# Patient Record
Sex: FEMALE | Race: WHITE | NOT HISPANIC OR LATINO | Employment: OTHER | URBAN - METROPOLITAN AREA
[De-identification: names, ages, dates, MRNs, and addresses within clinical notes are randomized per-mention and may not be internally consistent; named-entity substitution may affect disease eponyms.]

---

## 2017-01-13 ENCOUNTER — APPOINTMENT (OUTPATIENT)
Dept: RADIOLOGY | Facility: IMAGING CENTER | Age: 67
End: 2017-01-13
Attending: FAMILY MEDICINE
Payer: COMMERCIAL

## 2017-01-13 ENCOUNTER — OFFICE VISIT (OUTPATIENT)
Dept: MEDICAL GROUP | Facility: PHYSICIAN GROUP | Age: 67
End: 2017-01-13
Payer: COMMERCIAL

## 2017-01-13 VITALS
TEMPERATURE: 97.9 F | HEART RATE: 88 BPM | RESPIRATION RATE: 16 BRPM | HEIGHT: 65 IN | DIASTOLIC BLOOD PRESSURE: 68 MMHG | BODY MASS INDEX: 31.99 KG/M2 | SYSTOLIC BLOOD PRESSURE: 130 MMHG | OXYGEN SATURATION: 96 % | WEIGHT: 192 LBS

## 2017-01-13 DIAGNOSIS — Z13.6 SCREENING FOR CARDIOVASCULAR CONDITION: ICD-10-CM

## 2017-01-13 DIAGNOSIS — M19.049 ARTHRITIS OF HAND: ICD-10-CM

## 2017-01-13 DIAGNOSIS — Z00.00 ROUTINE GENERAL MEDICAL EXAMINATION AT A HEALTH CARE FACILITY: ICD-10-CM

## 2017-01-13 PROCEDURE — 73120 X-RAY EXAM OF HAND: CPT | Mod: TC,LT | Performed by: FAMILY MEDICINE

## 2017-01-13 PROCEDURE — 99214 OFFICE O/P EST MOD 30 MIN: CPT | Performed by: FAMILY MEDICINE

## 2017-01-13 PROCEDURE — 73120 X-RAY EXAM OF HAND: CPT | Mod: TC,RT | Performed by: FAMILY MEDICINE

## 2017-01-13 RX ORDER — TRAMADOL HYDROCHLORIDE 50 MG/1
50 TABLET ORAL 2 TIMES DAILY
Qty: 60 TAB | Refills: 0 | Status: ON HOLD | OUTPATIENT
Start: 2017-01-13 | End: 2017-01-21

## 2017-01-13 ASSESSMENT — PATIENT HEALTH QUESTIONNAIRE - PHQ9: CLINICAL INTERPRETATION OF PHQ2 SCORE: 0

## 2017-01-13 NOTE — PROGRESS NOTES
Subjective:     Chief Complaint   Patient presents with   • Hand Pain       Derek Guzman is a 66 y.o. female here today for worseing bilat hand pain several weeks.  Patient reports tenderness and pain that is worse with cold weather and use. She reports pain is particularly severe when she is typing at work. Pain is improved with biofreeze. Patient has tried alleve 2 AM and 2 PM minimal relief.    Pt is seen by [facility] for R shoulder pain due to arthritis.     Patient has not had general well exam in several years and would like to address general healthcare this time.    Allergies   Allergen Reactions   • Tape Rash     blisters     Current medicines (including changes today)  Current Outpatient Prescriptions   Medication Sig Dispense Refill   • tramadol (ULTRAM) 50 MG Tab Take 1 Tab by mouth 2 Times a Day. 60 Tab 0   • Calcium Carbonate-Vitamin D (CALCIUM + D PO) Take  by mouth.     • Cholecalciferol (VITAMIN D-3 PO) Take 400 Units by mouth.     • Naproxen Sodium (ALEVE PO) Take  by mouth.     • Multiple Vitamin (MULTI-VITAMIN PO) Take  by mouth.     • FIBER PO Take  by mouth.     • cetirizine (ZYRTEC) 10 MG TABS Take  by mouth.       No current facility-administered medications for this visit.     Social History   Substance Use Topics   • Smoking status: Former Smoker -- 0.50 packs/day for 7 years     Quit date: 1978   • Smokeless tobacco: Never Used      Comment: avoid all tobacco products   • Alcohol Use: No     Family Status   Relation Status Death Age   • Mother     • Father       Family History   Problem Relation Age of Onset   • Diabetes     • Heart Disease     • Cancer     • Cancer Mother      colon cancer twice, lung cancer   • Diabetes Mother    • Heart Disease Father      mi   • Diabetes Father      She    has a past medical history of Infectious disease; Allergic rhinitis (10/1/2009); Pain; and Sigmoid diverticulosis (2016).        ROS   GEN: no weight loss, fevers, or  "chills  HEENT: no blurry vision or change in vision, no ear pain, no difficulty swallowing, no throat pain, no runny nose, no nasal congestion  Resp: no shortness of breath, no cough  CV: no racing heart, no irregular beats, no chest pain  Abd: no nausea, no vomiting, no diarrhea, no constipation, no blood in stool, no dark stools, no incontinence  : no dysuria, no hematuria, no urinary incontinence, no increased frequency  MSK: no muscle aches, no joint pain, no limited motion  Neuro: no headaches, no dizziness, no LOC, no weakness, no numbness/tingling       Objective:     Blood pressure 130/68, pulse 88, temperature 36.6 °C (97.9 °F), resp. rate 16, height 1.651 m (5' 5\"), weight 87.091 kg (192 lb), SpO2 96 %. Body mass index is 31.95 kg/(m^2).   Physical Exam:  Constitutional: Alert, no distress.  Skin: Warm, dry, good turgor, no rashes in visible areas.  Eye: Equal, round and reactive, conjunctiva clear, lids normal.  ENMT: Lips without lesions, good dentition, oropharynx non-erythematous, no exudate, moist oral mucosa, bilateral tympanic membranes: No bulging, no retraction, no fluid, nonerythematous, positive light reflex, external auditory canals: Clear, scant cerumen, nonerythematous  Neck: Trachea midline, no masses, no thyromegaly. No cervical or supraclavicular lymphadenopathy. Full ROM  Respiratory: Unlabored respiratory effort, good air movement, lungs clear to auscultation, no wheezes, no ronchi.  Cardiovascular:RRR, +S1, S2, no murmur, no peripheral edema, pedal and radial pulses equal and intact bilat  Abdomen: Soft, non-tender, no masses, no hepatosplenomegaly.  MSK:5/5 muscle strength in upper extremities as well as lower extremity bilaterally, bilateral tarsophalangeal enlargement with erythema, no warmth  Psych: Alert and oriented x3, appropriate affect and mood.  Neuro: CN2-12 intact, no gross motor or sensory deficits      Assessment and Plan:   The following treatment plan was " discussed    1. Routine general medical examination at a health care facility  General exam as above  - COMP METABOLIC PANEL; Future  - CBC WITH DIFFERENTIAL; Future    2. Arthritis of hand  New Problem: Further workup indicated Worsening problems reported today. Unrelieved with naproxen. Recommend tramadol when necessary. We'll obtain x-rays to rule out other causes such as rheumatoid arthritis.   - DX-HAND 2- RIGHT; Future  - DX-HAND 2- LEFT; Future  - tramadol (ULTRAM) 50 MG Tab; Take 1 Tab by mouth 2 Times a Day.  Dispense: 60 Tab; Refill: 0  - [facility] PAIN MANAGEMENT SCREEN; Future  - CONSENT RISKS OF CONTROL SUBST  - COMP METABOLIC PANEL; Future  - CBC WITH DIFFERENTIAL; Future    3. Screening for cardiovascular condition  - LIPID PROFILE; Future    Patient states PNA and flu vaccine were given at pharmacy       Followup: Return in about 4 weeks (around 2/10/2017) for PAP.    Please note that this dictation was created using voice recognition software. I have made every reasonable attempt to correct obvious errors, but I expect that there are errors of grammar and possibly content that I did not discover before finalizing the note.

## 2017-01-13 NOTE — MR AVS SNAPSHOT
"Derek Guzman   2017 3:20 PM   Office Visit   MRN: [id number]    Department:  [facility]   Dept Phone:  [phone]    Description:  Female : 1950   Provider:  Lise Thrasher D.O.           Reason for Visit     Hand Pain           Allergies as of 2017     Allergen Noted Reactions    Tape 06/10/2011   Rash    blisters      You were diagnosed with     Routine general medical examination at a health care facility   [V70.0.ICD-9-CM]       Arthritis of hand   [206080]       Screening for cardiovascular condition   [402326]         Vital Signs     Blood Pressure Pulse Temperature Respirations Height Weight    130/68 mmHg 88 36.6 °C (97.9 °F) 16 1.651 m (5' 5\") 87.091 kg (192 lb)    Body Mass Index Oxygen Saturation Smoking Status             31.95 kg/m2 96% Former Smoker         Basic Information     Date Of Birth Sex Race Ethnicity Preferred Language    1950 Female White Non- English      Problem List              ICD-10-CM Priority Class Noted - Resolved    Allergic rhinitis    10/1/2009 - Present    History of colonoscopy with polypectomy Z98.890, Z86.010   2010 - Present    History of melanoma Z85.820   10/10/2013 - Present    Degenerative disc disease, lumbar M51.36   2014 - Present    Sigmoid diverticulosis K57.30   2016 - Present      Health Maintenance        Date Due Completion Dates    IMM INFLUENZA (1) 2016 ---    PAP SMEAR 10/3/2016 10/3/2013    IMM PNEUMOCOCCAL 65+ (ADULT) LOW/MEDIUM RISK SERIES (1 of 2 - PCV13) 2016 ---    MAMMOGRAM 2017, 4/3/2015, 2013, 2012, 2011, 2009, 2009, 2008, 2008, 2008, 2007, 2007, 2006, 2005, 10/13/2005    BONE DENSITY 2021, 2009    COLONOSCOPY 2021    IMM DTaP/Tdap/Td Vaccine (2 - Td) 2023            Current Immunizations     SHINGLES VACCINE 2016 10:20 AM    Tdap Vaccine 2013      "   Below and/or attached are the medications your provider expects you to take. Review all of your home medications and newly ordered medications with your provider and/or pharmacist. Follow medication instructions as directed by your provider and/or pharmacist. Please keep your medication list with you and share with your provider. Update the information when medications are discontinued, doses are changed, or new medications (including over-the-counter products) are added; and carry medication information at all times in the event of emergency situations     Allergies:  TAPE - Rash               Medications  Valid as of: January 13, 2017 -  6:24 PM    Generic Name Brand Name Tablet Size Instructions for use    Calcium Citrate-Vitamin D   Take  by mouth.        Cetirizine HCl (Tab) ZYRTEC 10 MG Take  by mouth.        Cholecalciferol   Take 400 Units by mouth.        Fiber   Take  by mouth.        Multiple Vitamin   Take  by mouth.        Naproxen Sodium   Take  by mouth.        TraMADol HCl (Tab) ULTRAM 50 MG Take 1 Tab by mouth 2 Times a Day.        .                 Medicines prescribed today were sent to:     [facility] [address] [place], [place] [address] [address] [address] AT [facility] [address] [place] [address] [place] [address]    Phone: [phone] Fax: [phone]    Open 24 Hours?: No      Medication refill instructions:       If your prescription bottle indicates you have medication refills left, it is not necessary to call your provider’s office. Please contact your pharmacy and they will refill your medication.    If your prescription bottle indicates you do not have any refills left, you may request refills at any time through one of the following ways: The online Inofile system (except Urgent Care), by calling your provider’s office, or by asking your pharmacy to contact your provider’s office with a refill request. Medication refills are processed only during regular business hours and may not be  available until the next business day. Your provider may request additional information or to have a follow-up visit with you prior to refilling your medication.   *Please Note: Medication refills are assigned a new Rx number when refilled electronically. Your pharmacy may indicate that no refills were authorized even though a new prescription for the same medication is available at the pharmacy. Please request the medicine by name with the pharmacy before contacting your provider for a refill.        Your To Do List     Future Labs/Procedures Complete By Expires    CBC WITH DIFFERENTIAL  As directed 1/13/2018    COMP METABOLIC PANEL  As directed 1/13/2018    DX-HAND 2- LEFT  As directed 1/13/2018    DX-HAND 2- RIGHT  As directed 1/13/2018    LIPID PROFILE  As directed 1/13/2018    [facility] PAIN MANAGEMENT SCREEN  As directed 1/13/2018    Comments:    Current Meds (name, sig, last dose):   Current outpatient prescriptions:   •  Calcium Carbonate-Vitamin D (CALCIUM + D PO), Take  by mouth.  •  Cholecalciferol (VITAMIN D-3 PO), Take 400 Units by mouth.  •  Naproxen Sodium (ALEVE PO), Take  by mouth.  •  Multiple Vitamin (MULTI-VITAMIN PO), Take  by mouth.  •  FIBER PO, Take  by mouth.  •  cetirizine, Take  by mouth.         [email or web address] Access Code: Activation code not generated  Current [email or web address] Status: Active

## 2017-01-16 ENCOUNTER — HOSPITAL ENCOUNTER (OUTPATIENT)
Dept: LAB | Facility: MEDICAL CENTER | Age: 67
End: 2017-01-16
Attending: FAMILY MEDICINE
Payer: COMMERCIAL

## 2017-01-16 DIAGNOSIS — Z13.6 SCREENING FOR CARDIOVASCULAR CONDITION: ICD-10-CM

## 2017-01-16 DIAGNOSIS — M19.049 ARTHRITIS OF HAND: ICD-10-CM

## 2017-01-16 DIAGNOSIS — Z00.00 ROUTINE GENERAL MEDICAL EXAMINATION AT A HEALTH CARE FACILITY: ICD-10-CM

## 2017-01-16 LAB
ALBUMIN SERPL BCP-MCNC: 3.8 G/DL (ref 3.2–4.9)
ALBUMIN/GLOB SERPL: 1.4 G/DL
ALP SERPL-CCNC: 68 U/L (ref 30–99)
ALT SERPL-CCNC: 15 U/L (ref 2–50)
ANION GAP SERPL CALC-SCNC: 4 MMOL/L (ref 0–11.9)
AST SERPL-CCNC: 18 U/L (ref 12–45)
BASOPHILS # BLD AUTO: 0.05 K/UL (ref 0–0.12)
BASOPHILS NFR BLD AUTO: 1 % (ref 0–1.8)
BILIRUB SERPL-MCNC: 0.4 MG/DL (ref 0.1–1.5)
BUN SERPL-MCNC: 23 MG/DL (ref 8–22)
CALCIUM SERPL-MCNC: 9.8 MG/DL (ref 8.5–10.5)
CHLORIDE SERPL-SCNC: 105 MMOL/L (ref 96–112)
CHOLEST SERPL-MCNC: 192 MG/DL (ref 100–199)
CO2 SERPL-SCNC: 28 MMOL/L (ref 20–33)
CREAT SERPL-MCNC: 0.74 MG/DL (ref 0.5–1.4)
EOSINOPHIL # BLD: 0.25 K/UL (ref 0–0.51)
EOSINOPHIL NFR BLD AUTO: 4.9 % (ref 0–6.9)
ERYTHROCYTE [DISTWIDTH] IN BLOOD BY AUTOMATED COUNT: 44.1 FL (ref 35.9–50)
GLOBULIN SER CALC-MCNC: 2.7 G/DL (ref 1.9–3.5)
GLUCOSE SERPL-MCNC: 94 MG/DL (ref 65–99)
HCT VFR BLD AUTO: 45.4 % (ref 37–47)
HDLC SERPL-MCNC: 56 MG/DL
HGB BLD-MCNC: 14.6 G/DL (ref 12–16)
IMM GRANULOCYTES # BLD AUTO: 0.01 K/UL (ref 0–0.11)
IMM GRANULOCYTES NFR BLD AUTO: 0.2 % (ref 0–0.9)
LDLC SERPL CALC-MCNC: 120 MG/DL
LYMPHOCYTES # BLD: 1.6 K/UL (ref 1–4.8)
LYMPHOCYTES NFR BLD AUTO: 31.4 % (ref 22–41)
MCH RBC QN AUTO: 30 PG (ref 27–33)
MCHC RBC AUTO-ENTMCNC: 32.2 G/DL (ref 33.6–35)
MCV RBC AUTO: 93.2 FL (ref 81.4–97.8)
MONOCYTES # BLD: 0.38 K/UL (ref 0–0.85)
MONOCYTES NFR BLD AUTO: 7.5 % (ref 0–13.4)
NEUTROPHILS # BLD: 2.8 K/UL (ref 2–7.15)
NEUTROPHILS NFR BLD AUTO: 55 % (ref 44–72)
NRBC # BLD AUTO: 0 K/UL
NRBC BLD-RTO: 0 /100 WBC
PLATELET # BLD AUTO: 237 K/UL (ref 164–446)
PMV BLD AUTO: 11.4 FL (ref 9–12.9)
POTASSIUM SERPL-SCNC: 4.6 MMOL/L (ref 3.6–5.5)
PROT SERPL-MCNC: 6.5 G/DL (ref 6–8.2)
RBC # BLD AUTO: 4.87 M/UL (ref 4.2–5.4)
SODIUM SERPL-SCNC: 137 MMOL/L (ref 135–145)
TRIGL SERPL-MCNC: 80 MG/DL (ref 0–149)
WBC # BLD AUTO: 5.1 K/UL (ref 4.8–10.8)

## 2017-01-16 PROCEDURE — 36415 COLL VENOUS BLD VENIPUNCTURE: CPT

## 2017-01-16 PROCEDURE — 85025 COMPLETE CBC W/AUTO DIFF WBC: CPT

## 2017-01-16 PROCEDURE — 80061 LIPID PANEL: CPT

## 2017-01-16 PROCEDURE — 80053 COMPREHEN METABOLIC PANEL: CPT

## 2017-01-20 ENCOUNTER — APPOINTMENT (OUTPATIENT)
Dept: RADIOLOGY | Facility: MEDICAL CENTER | Age: 67
End: 2017-01-20
Attending: EMERGENCY MEDICINE
Payer: COMMERCIAL

## 2017-01-20 ENCOUNTER — APPOINTMENT (OUTPATIENT)
Dept: RADIOLOGY | Facility: MEDICAL CENTER | Age: 67
End: 2017-01-20
Attending: ORTHOPAEDIC SURGERY
Payer: COMMERCIAL

## 2017-01-20 ENCOUNTER — HOSPITAL ENCOUNTER (OUTPATIENT)
Facility: MEDICAL CENTER | Age: 67
End: 2017-01-21
Attending: EMERGENCY MEDICINE | Admitting: ORTHOPAEDIC SURGERY
Payer: COMMERCIAL

## 2017-01-20 DIAGNOSIS — S82.872A CLOSED DISPLACED PILON FRACTURE OF LEFT TIBIA, INITIAL ENCOUNTER: ICD-10-CM

## 2017-01-20 DIAGNOSIS — S82.402A CLOSED FRACTURE OF SHAFT OF LEFT TIBIA AND FIBULA, INITIAL ENCOUNTER: ICD-10-CM

## 2017-01-20 DIAGNOSIS — S82.202A CLOSED FRACTURE OF SHAFT OF LEFT TIBIA AND FIBULA, INITIAL ENCOUNTER: ICD-10-CM

## 2017-01-20 PROBLEM — S82.209A TIBIA FRACTURE: Status: ACTIVE | Noted: 2017-01-20

## 2017-01-20 PROBLEM — S82.873A: Status: ACTIVE | Noted: 2017-01-20

## 2017-01-20 PROCEDURE — G0378 HOSPITAL OBSERVATION PER HR: HCPCS

## 2017-01-20 PROCEDURE — 700111 HCHG RX REV CODE 636 W/ 250 OVERRIDE (IP)

## 2017-01-20 PROCEDURE — 160039 HCHG SURGERY MINUTES - EA ADDL 1 MIN LEVEL 3: Performed by: ORTHOPAEDIC SURGERY

## 2017-01-20 PROCEDURE — 73590 X-RAY EXAM OF LOWER LEG: CPT | Mod: LT

## 2017-01-20 PROCEDURE — A4606 OXYGEN PROBE USED W OXIMETER: HCPCS | Performed by: ORTHOPAEDIC SURGERY

## 2017-01-20 PROCEDURE — 501735 HCHG WIRE, K-150MM: Performed by: ORTHOPAEDIC SURGERY

## 2017-01-20 PROCEDURE — A9270 NON-COVERED ITEM OR SERVICE: HCPCS

## 2017-01-20 PROCEDURE — 160002 HCHG RECOVERY MINUTES (STAT): Performed by: ORTHOPAEDIC SURGERY

## 2017-01-20 PROCEDURE — 700102 HCHG RX REV CODE 250 W/ 637 OVERRIDE(OP)

## 2017-01-20 PROCEDURE — 160028 HCHG SURGERY MINUTES - 1ST 30 MINS LEVEL 3: Performed by: ORTHOPAEDIC SURGERY

## 2017-01-20 PROCEDURE — 500881 HCHG PACK, EXTREMITY: Performed by: ORTHOPAEDIC SURGERY

## 2017-01-20 PROCEDURE — 502000 HCHG MISC OR IMPLANTS RC 0278: Performed by: ORTHOPAEDIC SURGERY

## 2017-01-20 PROCEDURE — 700101 HCHG RX REV CODE 250

## 2017-01-20 PROCEDURE — 73610 X-RAY EXAM OF ANKLE: CPT | Mod: LT

## 2017-01-20 PROCEDURE — 73700 CT LOWER EXTREMITY W/O DYE: CPT | Mod: LT

## 2017-01-20 PROCEDURE — 500054 HCHG BANDAGE, ELASTIC 6: Performed by: ORTHOPAEDIC SURGERY

## 2017-01-20 PROCEDURE — 110371 HCHG SHELL REV 272: Performed by: ORTHOPAEDIC SURGERY

## 2017-01-20 PROCEDURE — 501838 HCHG SUTURE GENERAL: Performed by: ORTHOPAEDIC SURGERY

## 2017-01-20 PROCEDURE — 99291 CRITICAL CARE FIRST HOUR: CPT

## 2017-01-20 PROCEDURE — 160035 HCHG PACU - 1ST 60 MINS PHASE I: Performed by: ORTHOPAEDIC SURGERY

## 2017-01-20 PROCEDURE — 96374 THER/PROPH/DIAG INJ IV PUSH: CPT

## 2017-01-20 PROCEDURE — C1713 ANCHOR/SCREW BN/BN,TIS/BN: HCPCS | Performed by: ORTHOPAEDIC SURGERY

## 2017-01-20 PROCEDURE — 160036 HCHG PACU - EA ADDL 30 MINS PHASE I: Performed by: ORTHOPAEDIC SURGERY

## 2017-01-20 PROCEDURE — A6454 SELF-ADHER BAND W>=3" <5"/YD: HCPCS | Performed by: ORTHOPAEDIC SURGERY

## 2017-01-20 PROCEDURE — 96375 TX/PRO/DX INJ NEW DRUG ADDON: CPT

## 2017-01-20 PROCEDURE — 501445 HCHG STAPLER, SKIN DISP: Performed by: ORTHOPAEDIC SURGERY

## 2017-01-20 PROCEDURE — 500122 HCHG BOVIE, BLADE: Performed by: ORTHOPAEDIC SURGERY

## 2017-01-20 PROCEDURE — 500465 HCHG DRILL BIT, ASIF: Performed by: ORTHOPAEDIC SURGERY

## 2017-01-20 PROCEDURE — 160009 HCHG ANES TIME/MIN: Performed by: ORTHOPAEDIC SURGERY

## 2017-01-20 PROCEDURE — 700111 HCHG RX REV CODE 636 W/ 250 OVERRIDE (IP): Performed by: EMERGENCY MEDICINE

## 2017-01-20 PROCEDURE — 502240 HCHG MISC OR SUPPLY RC 0272: Performed by: ORTHOPAEDIC SURGERY

## 2017-01-20 PROCEDURE — 110382 HCHG SHELL REV 271: Performed by: ORTHOPAEDIC SURGERY

## 2017-01-20 PROCEDURE — 160048 HCHG OR STATISTICAL LEVEL 1-5: Performed by: ORTHOPAEDIC SURGERY

## 2017-01-20 DEVICE — WIRE K 1.6 X 150 292.16 (10EA/PK) (11TX10+2TX6+1TX20=142)(CYC=30): Type: IMPLANTABLE DEVICE | Status: FUNCTIONAL

## 2017-01-20 DEVICE — IMPLANTABLE DEVICE: Type: IMPLANTABLE DEVICE | Status: FUNCTIONAL

## 2017-01-20 RX ORDER — MAGNESIUM HYDROXIDE 1200 MG/15ML
LIQUID ORAL
Status: DISCONTINUED | OUTPATIENT
Start: 2017-01-20 | End: 2017-01-20 | Stop reason: HOSPADM

## 2017-01-20 RX ORDER — ACETAMINOPHEN 500 MG
1000 TABLET ORAL EVERY 6 HOURS
Status: DISCONTINUED | OUTPATIENT
Start: 2017-01-20 | End: 2017-01-21 | Stop reason: HOSPADM

## 2017-01-20 RX ORDER — OXYCODONE HYDROCHLORIDE 5 MG/1
5 TABLET ORAL
Status: DISCONTINUED | OUTPATIENT
Start: 2017-01-20 | End: 2017-01-21 | Stop reason: HOSPADM

## 2017-01-20 RX ORDER — ONDANSETRON 2 MG/ML
4 INJECTION INTRAMUSCULAR; INTRAVENOUS EVERY 4 HOURS PRN
Status: DISCONTINUED | OUTPATIENT
Start: 2017-01-20 | End: 2017-01-21 | Stop reason: HOSPADM

## 2017-01-20 RX ORDER — OXYCODONE HYDROCHLORIDE 5 MG/1
2.5 TABLET ORAL
Status: DISCONTINUED | OUTPATIENT
Start: 2017-01-20 | End: 2017-01-21 | Stop reason: HOSPADM

## 2017-01-20 RX ORDER — BUPIVACAINE HYDROCHLORIDE AND EPINEPHRINE 5; 5 MG/ML; UG/ML
INJECTION, SOLUTION EPIDURAL; INTRACAUDAL; PERINEURAL
Status: DISCONTINUED | OUTPATIENT
Start: 2017-01-20 | End: 2017-01-20 | Stop reason: HOSPADM

## 2017-01-20 RX ORDER — MORPHINE SULFATE 4 MG/ML
2 INJECTION, SOLUTION INTRAMUSCULAR; INTRAVENOUS
Status: DISCONTINUED | OUTPATIENT
Start: 2017-01-20 | End: 2017-01-21 | Stop reason: HOSPADM

## 2017-01-20 RX ORDER — OXYCODONE HYDROCHLORIDE AND ACETAMINOPHEN 5; 325 MG/1; MG/1
TABLET ORAL
Status: COMPLETED
Start: 2017-01-20 | End: 2017-01-20

## 2017-01-20 RX ORDER — DIPHENHYDRAMINE HCL 25 MG
25 TABLET ORAL NIGHTLY PRN
COMMUNITY

## 2017-01-20 RX ORDER — CEFAZOLIN SODIUM 2 G/100ML
2 INJECTION, SOLUTION INTRAVENOUS EVERY 8 HOURS
Status: COMPLETED | OUTPATIENT
Start: 2017-01-21 | End: 2017-01-21

## 2017-01-20 RX ORDER — ONDANSETRON 2 MG/ML
4 INJECTION INTRAMUSCULAR; INTRAVENOUS ONCE
Status: COMPLETED | OUTPATIENT
Start: 2017-01-20 | End: 2017-01-20

## 2017-01-20 RX ADMIN — OXYCODONE AND ACETAMINOPHEN 2 TABLET: 5; 325 TABLET ORAL at 18:15

## 2017-01-20 RX ADMIN — ONDANSETRON 4 MG: 2 INJECTION, SOLUTION INTRAMUSCULAR; INTRAVENOUS at 10:09

## 2017-01-20 RX ADMIN — HYDROMORPHONE HYDROCHLORIDE 0.5 MG: 1 INJECTION, SOLUTION INTRAMUSCULAR; INTRAVENOUS; SUBCUTANEOUS at 10:10

## 2017-01-20 ASSESSMENT — PAIN SCALES - GENERAL
PAINLEVEL_OUTOF10: 4
PAINLEVEL_OUTOF10: 0
PAINLEVEL_OUTOF10: 4
PAINLEVEL_OUTOF10: 1
PAINLEVEL_OUTOF10: 7
PAINLEVEL_OUTOF10: 0
PAINLEVEL_OUTOF10: 2
PAINLEVEL_OUTOF10: 2
PAINLEVEL_OUTOF10: 0
PAINLEVEL_OUTOF10: 0
PAINLEVEL_OUTOF10: 2

## 2017-01-20 ASSESSMENT — LIFESTYLE VARIABLES
ALCOHOL_USE: NO
EVER_SMOKED: YES

## 2017-01-20 NOTE — IP AVS SNAPSHOT
" <p align=\"LEFT\"><IMG SRC=\"//[email or web address]\" alt=\"Image\" WIDTH=\"50%\" HEIGHT=\"200\" BORDER=\"\"></p>                   Name:Derek Guzman  Medical Record Number:[id number]  CSN: [id number]    YOB: 1950   Age: 66 y.o.  Sex: female  HT:1.651 m (5' 5\") WT: 84.369 kg (186 lb)          Admit Date: 1/20/2017     Discharge Date:   Today's Date: 1/21/2017  Attending Doctor:  Casey Jewell M.D.                  Allergies:  Tape          Follow-up Information     1. Follow up with Casey Jewell M.D..    Specialty:  Orthopaedics    Why:  Call ASAP to schedule appt for 10-14 days postop    Contact information    [address]  [address]  [facility] [address]  [phone]           Medication List      Take these Medications        Instructions    CALCIUM + D PO    Take 1 Tab by mouth every day.   Dose:  1 Tab       diphenhydrAMINE 25 MG Tabs   Commonly known as:  BENADRYL    Take 25 mg by mouth at bedtime as needed for Sleep.   Dose:  25 mg       MULTI-VITAMIN PO    Take 1 Tab by mouth every day.   Dose:  1 Tab       oxycodone-acetaminophen  MG Tabs   Commonly known as:  PERCOCET-10    Take 0.5-1 Tabs by mouth every four hours as needed for Moderate Pain.   Dose:  0.5-1 Tab       ZYRTEC 10 MG Tabs   Generic drug:  cetirizine    Take 10 mg by mouth 2 times a day.   Dose:  10 mg         "

## 2017-01-20 NOTE — H&P
CHIEF COMPLAINT:  Left ankle pain.    HISTORY OF PRESENT ILLNESS:  The patient is a 66-year-old female.  She was   walking and slipped on ice earlier today, twisting her left leg.  She had pain   and inability to bear weight.  She presented to the emergency department and   was found to have a left distal tibia fracture and a proximal fibular   fracture.  She denies other injuries.  She denies any numbness or paresthesias   in that extremity.    PAST MEDICAL HISTORY:  ALLERGIES:  No known drug allergies, but SHE IS ALLERGIC TO TAPE.    OUTPATIENT MEDICATIONS:  Include tramadol, calcium, vitamin D,   cholecalciferol, naproxen, multivitamin, fiber, and Zyrtec.    PAST MEDICAL DIAGNOSES:  Osteopenia, seasonal allergies.    PAST SURGICAL HISTORY:  Left foot surgery years ago, , shoulder   arthroscopy with rotator cuff repair.    SOCIAL HISTORY:  Patient quit smoking in .  She denies alcohol use.    Denies illicit drug use.  She lives in [place] with her .    FAMILY HISTORY:  Significant for diabetes, heart disease, colon cancer and   lung cancer in her mother.    REVIEW OF SYSTEMS:  She denies fevers, chills, nausea, vomiting, shortness of   breath, chest pain, otherwise normal per AMA criteria other than that already   stated in the HPI.    PHYSICAL EXAMINATION:  VITAL SIGNS:  Temperature is not recorded, heart rate 65, blood pressure   109/68, pulse oximetry 98% on room air.  GENERAL APPEARANCE:  Patient is alert.  She is oriented.  She is in no acute   distress.  HEAD, EYES, EARS, NOSE, AND THROAT:  Normocephalic and atraumatic.  Mucous   membranes are moist.  Nonicteric sclerae.  PULMONARY:  Symmetric, unlabored breathing.  CARDIOVASCULAR:  Extremities are well perfused.  No obvious elevated JVP is   noted.  ABDOMEN:  Thin, nondistended.  MUSCULOSKELETAL:  Left lower extremity, there is minimal swelling at the left   leg and ankle.  There is no obvious ecchymosis or no open wounds.  She has    palpable dorsalis pedis pulse.  She is able to dorsi and plantar flex her   foot, and flex and extend her toes with some mild limitation.  She has   sensation intact to light touch diffusely in the foot.  She has some   tenderness to palpation at the proximal leg laterally.  There is no obvious   knee effusion present.  The right lower extremity and bilateral upper   extremities are grossly neurovascularly intact without evidence of traumatic   deformity.    RADIOGRAPHIC DATA:  Plain x-rays of the left tibia as well as left ankle show   evidence of a minimally displaced spiral tibia shaft fracture and a mildly   displaced medial distal tibia fracture extending into the tibial plafond.    These findings are confirmed on CT scan.  She also has a minimally displaced   proximal fibular fracture.  There is a retained plate within the foot.  X-rays   of her hands are unremarkable from 01/13/2017.    ASSESSMENT:  A 66-year-old female with a left intraarticular distal tibia   fracture and proximal fibula fracture.  The articular fracture is mildly   displaced measuring approximately 2.5 mm on CT.  This is a closed injury.  She   is neurovascularly intact.  There is no concern for compartment syndrome   currently.    PLAN:  1.  I discussed findings with the patient.  We discussed both nonoperative and   operative management.  We discussed potential for developing posttraumatic   arthrosis given the intraarticular displacement at the ankle joint with   nonoperative management.  We discussed the risks of surgery, which would   consist of open treatment with internal fixation with plates and screws.  We   discussed the risks of surgery being infection, nonunion, malunion, wound   healing issues, implant prominence, possibly requiring removal in the future,   DVT and pulmonary embolism, neurovascular injury, ankle pain and stiffness,   general risk of anesthesia including heart attack, stroke, and death.  She   expressed  understanding and she prefers surgical management.  2.  Patient is currently n.p.o.  I will try to make preparations to go to the   operating room today for surgical fixation of her left intraarticular distal   tibia fracture, as she has very minimal soft tissue swelling and feels she is   appropriate for early definitive fixation.  3.  She should continue to be nonweightbearing left upper extremity and   elevate it in the meantime.       ____________________________________     MD BEBO Alan / REGGIE    DD:  01/20/2017 13:22:58  DT:  01/20/2017 13:43:20    D#:  [id number]  Job#:  [id number]

## 2017-01-20 NOTE — ED PROVIDER NOTES
ED Provider Note    CHIEF COMPLAINT  Chief Complaint   Patient presents with   • T-5000 FALL     pt reports that she had a slip and fall this morning in the driveway. landed on behind, reports left leg pain, with obvious swelling. CMS+       HPI  Derek Guzman is a 66 y.o. female who presents for evaluation of ground-level fall. The patient reports slipping on the driveway. She twisted her left distal leg and ankle. She specifically denies any preceding chest pain palpitations numbness weakness or tingling. She was unable to bear weight. Denies any injury to the upper extremities had neck chest abdomen or pelvis. She denies any pain in the left hip. She reports no numbness weakness or tingling. She arrives here by car with her .    REVIEW OF SYSTEMS  See HPI for further details. No loss of consciousness headache and injury All other systems are negative.     PAST MEDICAL HISTORY  Past Medical History   Diagnosis Date   • Infectious disease      pt works at [employer]   • Allergic rhinitis 10/1/2009   • Pain      left foot   • Sigmoid diverticulosis 2016       FAMILY HISTORY  Noncontributory    SOCIAL HISTORY  Social History     Social History   • Marital Status:      Spouse Name: N/A   • Number of Children: N/A   • Years of Education: N/A     Social History Main Topics   • Smoking status: Former Smoker -- 0.50 packs/day for 7 years     Quit date: 1978   • Smokeless tobacco: Never Used      Comment: avoid all tobacco products   • Alcohol Use: No   • Drug Use: No   • Sexual Activity: Not on file     Other Topics Concern   • Not on file     Social History Narrative     Former smoker no IV drugs  SURGICAL HISTORY  Past Surgical History   Procedure Laterality Date   • Pr  delivery only     • Shoulder arthroscopy  2009     right; Performed by RICHARD ELIZONDO at SURGERY SAME DAY [facility] ORS   • Rotator cuff repair  2009     Performed by RICHARD ELIZONDO at SURGERY SAME DAY  "[facility] ORS   • Toe fusion  6/20/2011     Performed by BARTOLOME ROBLES at SURGERY [facility] ORS   • Bone graft  6/20/2011     Performed by BARTOLOME ROBLES at [facility] ORS       CURRENT MEDICATIONS  No current facility-administered medications for this encounter.    Current outpatient prescriptions:   •  tramadol (ULTRAM) 50 MG Tab, Take 1 Tab by mouth 2 Times a Day., Disp: 60 Tab, Rfl: 0  •  Calcium Carbonate-Vitamin D (CALCIUM + D PO), Take  by mouth., Disp: , Rfl:   •  Cholecalciferol (VITAMIN D-3 PO), Take 400 Units by mouth., Disp: , Rfl:   •  Naproxen Sodium (ALEVE PO), Take  by mouth., Disp: , Rfl:   •  Multiple Vitamin (MULTI-VITAMIN PO), Take  by mouth., Disp: , Rfl:   •  FIBER PO, Take  by mouth., Disp: , Rfl:   •  cetirizine (ZYRTEC) 10 MG TABS, Take  by mouth., Disp: , Rfl:       ALLERGIES  Allergies   Allergen Reactions   • Tape Rash     blisters       PHYSICAL EXAM  VITAL SIGNS: /94 mmHg  Pulse 65  Resp 16  Ht 1.651 m (5' 5\")  Wt 84.369 kg (186 lb)  BMI 30.95 kg/m2  SpO2 98%      Constitutional: Well developed, Well nourished, No acute distress, Non-toxic appearance.   HENT: Normocephalic, Atraumatic, Bilateral external ears normal, Oropharynx moist, No oral exudates, Nose normal.   Eyes: PERRLA, EOMI, Conjunctiva normal, No discharge.   Neck: Normal range of motion, No tenderness, Supple, No stridor.   Cardiovascular: Normal heart rate, Normal rhythm, No murmurs, No rubs, No gallops.   Thorax & Lungs: Normal breath sounds, No respiratory distress, No wheezing, No chest tenderness.   Abdomen: Bowel sounds normal, Soft, No tenderness, No masses, No pulsatile masses.   Skin: Warm, Dry, No erythema, No rash.   Back: No tenderness, No CVA tenderness.   Extremities: Tenderness noted in the distal 3rd of the left tib-fib. Compartments are soft. No gross angulation or shortening. Dorsalis pedal pulses intact tenderness over the lateral medial malleolus. No pain noted in the left " knee or hip   Neurologic: Alert & oriented x 3, Normal motor function, Normal sensory function, No focal deficits noted.   Psychiatric: Affect normal, Judgment normal, Mood normal.         RADIOLOGY/PROCEDURES  CT-ANKLE W/O PLUS RECONS LEFT   Final Result      1.  Comminuted fracture of the distal LEFT tibia with involvement of the ventricular surface as described.   2.  No dislocation.      DX-TIBIA AND FIBULA LEFT   Final Result      1.  Comminuted fracture of the distal LEFT tibial diaphysis and metaphysis with involvement of the articular surface of the tibial plafond.   2.  Mildly displaced proximal LEFT fibular shaft fracture.      DX-ANKLE 3+ VIEWS LEFT   Final Result         Comminuted spiral fracture in the distal tibial diaphysis extending to the tibial plafond overlying soft tissue.            COURSE & MEDICAL DECISION MAKING  Pertinent Labs & Imaging studies reviewed. (See chart for details)  Patient was given IV pain medication. Emergent consultation with Dr. Jewell was obtained with orthopedics. She has a very complex multi bone fracture of the left lower extremity involving both the fibula and tibia. Based on its nature Dr. Jewell feels that she will require immediate operative intervention. She'll be admitted to orthopedics for ORIF    FINAL IMPRESSION  1. Multiple left lower extremity fractures including spiral tibial fracture proximal fibular fracture         Electronically signed by: Jarrod Contreras, 1/20/2017 10:37 AM

## 2017-01-20 NOTE — IP AVS SNAPSHOT
Unsocial Access Code: Activation code not generated  Current Unsocial Status: Active    [employer]  A secure, online tool to manage your health information     [employer] Unsocial® is a secure, online tool that connects you to your personalized health information from the privacy of your home -- day or night - making it very easy for you to manage your healthcare. Once the activation process is completed, you can even access your medical information using the Unsocial fernando, which is available for free in the Apple Fernando store or Google Play store.     Unsocial provides the following levels of access (as shown below):   My Chart Features   [facility] Primary Care Doctor [facility]  Specialists [facility]  Urgent  Care Non-[facility]  Primary Care  Doctor   Email your healthcare team securely and privately 24/7 X X X X   Manage appointments: schedule your next appointment; view details of past/upcoming appointments X      Request prescription refills. X      View recent personal medical records, including lab and immunizations X X X X   View health record, including health history, allergies, medications X X X X   Read reports about your outpatient visits, procedures, consult and ER notes X X X X   See your discharge summary, which is a recap of your hospital and/or ER visit that includes your diagnosis, lab results, and care plan. X X       How to register for Unsocial:  1. Go to  [email or web address].  2. Click on the Sign Up Now box, which takes you to the New Member Sign Up page. You will need to provide the following information:  a. Enter your Unsocial Access Code exactly as it appears at the top of this page. (You will not need to use this code after you’ve completed the sign-up process. If you do not sign up before the expiration date, you must request a new code.)   b. Enter your date of birth.   c. Enter your home email address.   d. Click Submit, and follow the next screen’s instructions.  3. Create a Unsocial ID. This will  be your [employer] login ID and cannot be changed, so think of one that is secure and easy to remember.  4. Create a [employer] password. You can change your password at any time.  5. Enter your Password Reset Question and Answer. This can be used at a later time if you forget your password.   6. Enter your e-mail address. This allows you to receive e-mail notifications when new information is available in [employer].  7. Click Sign Up. You can now view your health information.    For assistance activating your [employer] account, call [phone]

## 2017-01-20 NOTE — IP AVS SNAPSHOT
1/21/2017          Derek Guzman  [address] [place] [address]    Dear Derek:    [facility] wants to ensure your discharge home is safe and you or your loved ones have had all your questions answered regarding your care after you leave the hospital.    You may receive a telephone call within two days of your discharge.  This call is to make certain you understand your discharge instructions as well as ensure we provided you with the best care possible during your stay with us.     The call will only last approximately 3-5 minutes and will be done by a nurse.    Once again, we want to ensure your discharge home is safe and that you have a clear understanding of any next steps in your care.  If you have any questions or concerns, please do not hesitate to contact us, we are here for you.  Thank you for choosing [facility] for your healthcare needs.    Sincerely,    Luis Ca    [facility]

## 2017-01-20 NOTE — ED NOTES
Chief Complaint   Patient presents with   • T-5000 FALL     pt reports that she had a slip and fall this morning in the driveway. landed on behind, reports left leg pain, with obvious swelling. CMS+     Chart up for ERP.

## 2017-01-21 VITALS
SYSTOLIC BLOOD PRESSURE: 102 MMHG | TEMPERATURE: 97.2 F | WEIGHT: 186 LBS | RESPIRATION RATE: 15 BRPM | BODY MASS INDEX: 30.99 KG/M2 | HEIGHT: 65 IN | DIASTOLIC BLOOD PRESSURE: 57 MMHG | HEART RATE: 80 BPM | OXYGEN SATURATION: 92 %

## 2017-01-21 PROCEDURE — 96375 TX/PRO/DX INJ NEW DRUG ADDON: CPT

## 2017-01-21 PROCEDURE — 97161 PT EVAL LOW COMPLEX 20 MIN: CPT | Performed by: PHYSICAL THERAPIST

## 2017-01-21 PROCEDURE — G8978 MOBILITY CURRENT STATUS: HCPCS | Mod: CJ | Performed by: PHYSICAL THERAPIST

## 2017-01-21 PROCEDURE — A9270 NON-COVERED ITEM OR SERVICE: HCPCS | Performed by: ORTHOPAEDIC SURGERY

## 2017-01-21 PROCEDURE — G0378 HOSPITAL OBSERVATION PER HR: HCPCS

## 2017-01-21 PROCEDURE — 700111 HCHG RX REV CODE 636 W/ 250 OVERRIDE (IP): Performed by: ORTHOPAEDIC SURGERY

## 2017-01-21 PROCEDURE — 700102 HCHG RX REV CODE 250 W/ 637 OVERRIDE(OP): Performed by: ORTHOPAEDIC SURGERY

## 2017-01-21 PROCEDURE — 96376 TX/PRO/DX INJ SAME DRUG ADON: CPT

## 2017-01-21 PROCEDURE — G8979 MOBILITY GOAL STATUS: HCPCS | Mod: CJ | Performed by: PHYSICAL THERAPIST

## 2017-01-21 PROCEDURE — 700105 HCHG RX REV CODE 258

## 2017-01-21 PROCEDURE — G8980 MOBILITY D/C STATUS: HCPCS | Mod: CJ | Performed by: PHYSICAL THERAPIST

## 2017-01-21 RX ORDER — SODIUM CHLORIDE 9 MG/ML
INJECTION, SOLUTION INTRAVENOUS
Status: COMPLETED
Start: 2017-01-21 | End: 2017-01-21

## 2017-01-21 RX ORDER — OXYCODONE AND ACETAMINOPHEN 10; 325 MG/1; MG/1
.5-1 TABLET ORAL EVERY 4 HOURS PRN
Qty: 50 TAB | Refills: 0 | Status: SHIPPED | OUTPATIENT
Start: 2017-01-21 | End: 2017-07-31

## 2017-01-21 RX ADMIN — ACETAMINOPHEN 1000 MG: 500 TABLET, FILM COATED ORAL at 06:27

## 2017-01-21 RX ADMIN — ACETAMINOPHEN 1000 MG: 500 TABLET, FILM COATED ORAL at 00:51

## 2017-01-21 RX ADMIN — CEFAZOLIN SODIUM 2 G: 2 INJECTION, SOLUTION INTRAVENOUS at 00:52

## 2017-01-21 RX ADMIN — OXYCODONE HYDROCHLORIDE 5 MG: 5 TABLET ORAL at 06:28

## 2017-01-21 RX ADMIN — ENOXAPARIN SODIUM 40 MG: 100 INJECTION SUBCUTANEOUS at 06:30

## 2017-01-21 RX ADMIN — SODIUM CHLORIDE: 9 INJECTION, SOLUTION INTRAVENOUS at 02:45

## 2017-01-21 RX ADMIN — CEFAZOLIN SODIUM 2 G: 2 INJECTION, SOLUTION INTRAVENOUS at 08:53

## 2017-01-21 ASSESSMENT — GAIT ASSESSMENTS
DEVIATION: ANTALGIC;DECREASED BASE OF SUPPORT;DECREASED HEEL STRIKE;DECREASED TOE OFF
ASSISTIVE DEVICE: FRONT WHEEL WALKER
DISTANCE (FEET): 25
GAIT LEVEL OF ASSIST: STAND BY ASSIST

## 2017-01-21 ASSESSMENT — PAIN SCALES - GENERAL
PAINLEVEL_OUTOF10: 2

## 2017-01-21 NOTE — FACE TO FACE
Face to Face Note  -  Durable Medical Equipment    Casey Jewell M.D. - NPI: [id number]  I certify that this patient is under my care and that they had a durable medical equipment(DME)face to face encounter by myself that meets the physician DME face-to-face encounter requirements with this patient on:    Date of encounter:   Patient:                    MRN:                       YOB: 2016  Derek Guzman  [id number]  1950     The encounter with the patient was in whole, or in part, for the following medical condition, which is the primary reason for durable medical equipment:  Post-Op Surgery    I certify that, based on my findings, the following durable medical equipment is medically necessary:  Crutches.    HOME O2 Saturation Measurements:(Values must be present for Home Oxygen orders)         ,     ,         My Clinical findings support the need for the above equipment due to:  Bedbound/non-weight bearing    Supporting Symptoms: TTWB x 6-8 weeks postop ORIF left tibia fx

## 2017-01-21 NOTE — PROGRESS NOTES
66yoF with right tibia/fibula shaft fxs with extension into tibial plafond s/p ORIF 1/20.    S: comfortable, pain controlled.  Awaiting PT.  No complaints.    O:  Filed Vitals:    01/20/17 2145 01/21/17 0000 01/21/17 0400 01/21/17 0700   BP: 84/55 85/57 96/58 102/57   Pulse: 74 77 71 80   Temp: 36.4 °C (97.5 °F) 36.5 °C (97.7 °F) 36.8 °C (98.2 °F) 36.2 °C (97.2 °F)   Resp: 16 16 16 15   Height:       Weight:       SpO2: 90% 92% 93% 92%     Exam:  General-NAD, alert and following commands  RLE-splint c/d/i, flexing and extending toes actively without pain, SILT in toes with BCR    A: 66yoF with right tibia/fibula shaft fxs with extension into tibial plafond s/p ORIF 1/20.    Plan:  --PT for crutch training  --TTWB LLE x 6-8 weeks  --anticipate d/c to home today if cleared by PT  --fu 2 weeks postop

## 2017-01-21 NOTE — OP REPORT
DATE OF SERVICE:  01/20/2017    PREOPERATIVE DIAGNOSES:  Left tibia and fibular shaft fractures with extension   into the tibial pilon with displacement of the weightbearing surfaces of the   tibial plafond.    POSTOPERATIVE DIAGNOSES:  Left tibia and fibular shaft fractures with   extension into the tibial pilon with displacement of the weightbearing   surfaces of the tibial plafond.    PROCEDURE PERFORMED:  Open treatment with internal fixation of left tibial   pilon fracture and associated tibia shaft fracture with internal fixation.    SURGEON:  Casey Jewell MD    ANESTHESIOLOGIST:  Delio Harrell MD.    ANESTHESIA:  General.    ANTIBIOTICS:  Ancef.    ESTIMATED BLOOD LOSS:  Minimal.    TOURNIQUET TIME:  71 minutes at 250 mmHg to left thigh.    IMPLANTS:  Synthes 10-hole anteromedial variable angle distal tibial locking   plate with combination of locking and nonlocking screws.    INDICATION FOR PROCEDURE:  The patient is a 66-year-old female.  She slipped   on ice earlier today, twisting her left leg.  She presented to the emergency   department with pain and inability to bear weight and x-rays showed a spiral   comminuted fracture of the tibia shaft with extension into the tibial plafond   and displacement of approximately 2.5 mm at the medial weightbearing surface   of the tibial plafond as well as a nondisplaced posterior involvement.  She   has also had proximal third fibular shaft fracture.  Overall, the tibia   fracture that was reasonably well aligned and very mildly displaced; however,   we did discuss the intraarticular involvement, I felt that she could   potentially benefit from surgical reduction and fixation and we discussed   risks, benefits, and alternatives to surgical management including   nonoperative management.  She preferred surgery and signed informed consent   and wished to proceed as outlined above.    DESCRIPTION OF PROCEDURE:  The patient was met in the preoperative holding    area.  Her surgical site was signed and consent was confirmed for accuracy.    She was taken back to the operating room and general anesthesia was induced.    Ancef was administered and tourniquet was applied to left thigh.  Alcohol was   used for visually clean the limb.  The left lower extremity was then prepped   and draped in the usual sterile fashion.  A formal timeout was performed to   confirm the patient's correct name, correct surgical site, correct procedure,   and correct laterality.  The limb was then exsanguinated with an Esmarch and   the tourniquet was inflated to 250 mmHg.  A longitudinal incision was made   centered over the distal tibia medially with a knife down through skin.    Metzenbaum scissors were used to dissect and mobilize the saphenous vein and   nerve and protected it throughout the duration of the procedure.  Dissection   was carried down to periosteum, which was intact distally.  It was incised at   the area of the fracture.  There was a spike associated with a large medial   fracture segment that extended in the tibial plafond and there was an anterior   metaphyseal fragment extending to the distal third of the tibia shaft and a   longer spiral fracture extending to the mid diaphysis.  I exposed the fracture   and exposed the distal articular gapping with 2-point reduction forceps   reduced this fracture and then placed a 2.7 mm lag screw to achieve   interfragmentary compression at the articular surface.  I then percutaneously   slid a 10-hole anteromedial variable angle distal tibial locking plate from   the medial incision proximally and made a counter incision proximally and   positioned the plate sufficiently guided by AP and lateral fluoroscopic   imaging and pinned it into place.  I then used a 2-point reduction forceps to   reduce the distal portion of the fracture and stabilize the medial metaphyseal  fracture spike.   I then evaluated the reduction on AP and lateral  fluoroscopic   imaging, which confirmed overall acceptable alignment of the fracture.  I felt   the rotation was sufficient and the fracture was minimally displaced.  I therefore   placed several bicortical nonlocking and  unicortical locking screws in the distal   segment and then 3 bicortical nonlocking screws proximally through counter   incision felt that fixation was sufficient.  Final fluoroscopic imaging confirmed   overall acceptable alignment  of the fracture and acceptable position of the   implants.  The wounds were thoroughly irrigated and reapproximated subQ layers   with 0 Vicryl and skin edges and superficial subQ layers with 2-0 Vicryl and the skin   edges with staples.  Total of 20 mL of 0.5% Marcaine with epinephrine was injected into   the incision site for postop analgesia.  I then thoroughly cleansed and dried   the wound and applied a sterile compressive dressing.  She was then placed   into a short leg plaster well-padded splint.  Tourniquet was deflated after 71   minutes and she was awoken from anesthesia and transferred on the [facility] and   taken to postanesthesia care unit in stable condition.    PLAN:  1.  The patient will be admitted overnight for observation.  2.  She will receive Ancef 2 doses of postop for routine infection   prophylaxis.  3.  She will have SCDs while inpatient and a dose of Lovenox in the morning   for DVT prophylaxis.  4.  We will work with physical therapy for crutch training.  5.  Anticipated discharge to home tomorrow if she is doing well.       ____________________________________     MD BEBO Alan / REGGIE    DD:  01/20/2017 17:40:42  DT:  01/20/2017 18:08:26    D#:  [id number]  Job#:  [id number]

## 2017-01-21 NOTE — PROGRESS NOTES
Patient given discharge instructions and RX plus note for work excuse,  is here to take patient home, patient discharge in stable condition.

## 2017-01-21 NOTE — PROGRESS NOTES
Pt received from recovery via abdullahi, awake and alert, slideboard assist into bed,  Bulky splint drsg c/d/i to LLE and LLE elevated on pillows, wiggles toes, they are warm and pink, currently rates pain 1/10.  at bedside, admit profile complete, reviewed plan of care, orient to call light and to call for assist before OOB . Tolerating PO fluids without N/V, will monitor

## 2017-01-21 NOTE — PROGRESS NOTES
66yoF with right tibia/fibula shaft fxs with extension into tibial plafond s/p ORIF today.    S: comfortable in PACU,  at bedside.    O:  Filed Vitals:    01/20/17 1800 01/20/17 1815 01/20/17 1830 01/20/17 1900   BP:       Pulse: 76 73 75 76   Temp:       Resp: 14 14 14 15   Height:       Weight:       SpO2: 96% 98% 96% 96%     Exam:  General-NAD, alert and following commands  RLE-splint c/d/i, flexing and extending toes actively without pain, SILT in toes with BCR    A: 66yoF with right tibia/fibula shaft fxs with extension into tibial plafond s/p ORIF 1/20.    Plan:  --Admit overnight for obx  --ancef x 2 doses postop  --PT for crutch training  --TTWB LLE x 6-8 weeks  --anticipate d/c to home tomorrow

## 2017-01-21 NOTE — OR SURGEON
Immediate Post-Operative Note      PreOp Diagnosis: Left tibia pilon fracture    PostOp Diagnosis: same    Procedure(s) (LRB):  TIBIA ORIF (Left)    Surgeon(s):  Casey Jewell M.D.    Anesthesiologist/Type of Anesthesia:  Anesthesiologist: Delio Harrell M.D./General    Surgical Staff:  Circulator: Carmelina Nunez R.N.  Relief Scrub: Tamara Moctezuma  Scrub Person: Lyly Tarango    Specimen: none    Estimated Blood Loss: minimal    Findings: see dictation    Complications: none known    PLAN:  --Admit overnight for obx  --ancef x 2 doses postop  --PT for crutch training  --TTWB LLE x 6-8 weeks  --anticipate d/c to home tomorrow        1/20/2017 5:22 PM Casey Jewell

## 2017-01-21 NOTE — PROGRESS NOTES
Pt up to commode with 1  Person assist, voided without difficulty, BP rsystolic consistent in the high 80's to 90's, pt states this is normal for her, no complaints of being dizzy or lightheaded, will monitor

## 2017-01-21 NOTE — OR NURSING
Pt straight cathed after beginning of anesthesia administration, per pt request and Dr. Jewell agreement in pre-op. 600ml clear yellow urine on return

## 2017-01-21 NOTE — DISCHARGE SUMMARY
ADMISSION DIAGNOSES:  Left tibia and fibula shaft fracture with extension into   the weightbearing surface of the tibial plafond.    DISCHARGE DIAGNOSES: Left tibia and fibula shaft fracture with extension into   the weightbearing surface of the tibial plafond, status post open treatment   with internal fixation.    BRIEF HOSPITAL COURSE:  The patient was admitted to the emergency department   by myself on 01/20/2017, taken to the operating room for surgical fixation of   her tibia fracture.  Postoperatively, she was admitted to the orthopedic floor   where she received Ancef for postop prophylaxis.  Her vital signs have been   stable.  She has been afebrile.  Her pain is currently controlled on oral pain   medication.  She is waiting for physical therapy for gait training and after   that point will be appropriate for discharge to home.    OPERATIVE SERVICES:  On 01/20/2017 she underwent open treatment with internal   fixation of left tibia shaft fracture with extension into the weightbearing   surface of the tibial plafond by myself.    DISCHARGE INSTRUCTIONS:  1.  The patient should be toe touch weightbearing to the left lower extremity   for 6-8 weeks postop.  2.  She should keep her splint clean, dry, and intact and continue elevation   when not ambulating.  3.  She should return to the emergency department if she develops fevers,   chills, nausea, vomiting, shortness of breath, chest pain or pain uncontrolled   by oral pain medication.    DISCHARGE MEDICATIONS:  Percocet 10/325 one-half to 1 tablet q. 4 hours p.r.n.   pain.  1.  She can resume her other home medications with the exception of tramadol,   which she should not take in combination with Percocet.    FOLLOWUP:  Patient should follow up in 10-14 days for routine wound check and   staple removal and conversion to a pneumatic boot to start early ankle range   of motion.       ____________________________________     MD BEBO Alan /  REGGIE    DD:  01/21/2017 10:02:35  DT:  01/21/2017 10:15:43    D#:  [id number]  Job#:  [id number]

## 2017-01-21 NOTE — CARE PLAN
Problem: Safety  Goal: Free from accidental injury  Outcome: PROGRESSING AS EXPECTED  Call light and personal items in place, hourly rounding in practice. Pt calls for assist before OOB    Problem: Elimination  Goal: Establishment and Maintenance of regular bowel elimination  Outcome: MET Date Met:  01/21/17  Pt had BM 1/21  Goal: Regular urinary elimination  Outcome: MET Date Met:  01/21/17  Pt voiding without difficulty    Problem: Pain  Goal: Alleviation of Pain or a reduction in pain to the patient’s comfort goal  Outcome: PROGRESSING AS EXPECTED  Pt reports minimal pain. Scheduled tylenol given. Oxycodone q3h prn. Medicated x1 per MAR

## 2017-01-21 NOTE — DISCHARGE INSTRUCTIONS
Discharge Instructions    *Follow up with Dr. Jewell in 10-14 days in his office  *Weight bearing not allowed on the left leg for 6-8 weeks                 *Activity as tolerated  *Use assistive device for all activity  *Continue exercises provided by physical therapy  *Elevate leg as needed  *Ice as needed (20 minutes every 1-2 hours)  *keep dressing clean and dry until follow up  *Ok to shower with dressing covered  *No soaking of the incision; no baths, hot tubs, or swimming until cleared by doctor         *No driving until cleared by doctor  *Take medications as prescribed by doctor  *Call doctor’s office with any questions or concerns   Discharged to home by car with relative. Discharged via wheelchair, hospital escort: Yes.  Special equipment needed: Walker    Be sure to schedule a follow-up appointment with your primary care doctor or any specialists as instructed.     Discharge Plan:   Influenza Vaccine Indication: Patient Refuses    I understand that a diet low in cholesterol, fat, and sodium is recommended for good health. Unless I have been given specific instructions below for another diet, I accept this instruction as my diet prescription.     Special Instructions: Discharge instructions for the Orthopedic Patient    Follow up with Primary Care Physician within 2 weeks of discharge to home, regarding:  Review of medications and diagnostic testing.  Surveillance for medical complications.  Workup and treatment of osteoporosis, if appropriate.     -Is this a Joint Replacement patient? No    -Is this patient being discharged with medication to prevent blood clots?  No    · Is patient discharged on Warfarin / Coumadin?   No     · Is patient Post Blood Transfusion?  No    Depression / Suicide Risk    As you are discharged from this [facility] facility, it is important to learn how to keep safe from harming yourself.    Recognize the warning signs:  · Abrupt changes in personality, positive or negative-  including increase in energy   · Giving away possessions  · Change in eating patterns- significant weight changes-  positive or negative  · Change in sleeping patterns- unable to sleep or sleeping all the time   · Unwillingness or inability to communicate  · Depression  · Unusual sadness, discouragement and loneliness  · Talk of wanting to die  · Neglect of personal appearance   · Rebelliousness- reckless behavior  · Withdrawal from people/activities they love  · Confusion- inability to concentrate     If you or a loved one observes any of these behaviors or has concerns about self-harm, here's what you can do:  · Talk about it- your feelings and reasons for harming yourself  · Remove any means that you might use to hurt yourself (examples: pills, rope, extension cords, firearm)  · Get professional help from the community (Mental Health, Substance Abuse, psychological counseling)  · Do not be alone:Call your Safe Contact- someone whom you trust who will be there for you.  · Call your local CRISIS HOTLINE 891-6644 or 622-180-1369  · Call your local Children's Mobile Crisis Response Team [place] (529) 135-1017 or www.[employer]  · Call the toll free National Suicide Prevention Hotlines   · National Suicide Prevention Lifeline 072-410-HJBM (2647)  · National Hope Line Network 800-SUICIDE (496-1974)

## 2017-02-03 ENCOUNTER — TELEPHONE (OUTPATIENT)
Dept: MEDICAL GROUP | Facility: PHYSICIAN GROUP | Age: 67
End: 2017-02-03

## 2017-02-03 NOTE — TELEPHONE ENCOUNTER
Pt is requesting results from her bone density and arthritis testing. Please advise.    She also wanted me to let you know that she fractured her left left and broke the tib.    Please advise.

## 2017-02-03 NOTE — TELEPHONE ENCOUNTER
DEXA scan is a bone density test. Patient had DEXA scan in June 2016 which showed osteopenia. This means low bone density without diagnosis of osteoporosis. I recommend patient take calcium 500 mg daily and vitamin D 2000 units daily to improve bone health. I recommend to wait 1-2 years before repeating a DEXA scan.

## 2017-02-03 NOTE — TELEPHONE ENCOUNTER
Pt notified. She was currently taking 1200 of the calcium and 1000 of the d3, she will not switch the doses and lower calcium and up d3 to what is recommended.

## 2017-02-03 NOTE — ADDENDUM NOTE
Encounter addended by: Maite Qureshi R.N. on: 2/2/2017  5:28 PM<BR>     Documentation filed: Inpatient Document Flowsheet

## 2017-02-13 ENCOUNTER — OFFICE VISIT (OUTPATIENT)
Dept: MEDICAL GROUP | Facility: PHYSICIAN GROUP | Age: 67
End: 2017-02-13
Payer: COMMERCIAL

## 2017-02-13 VITALS
SYSTOLIC BLOOD PRESSURE: 130 MMHG | OXYGEN SATURATION: 94 % | HEART RATE: 66 BPM | DIASTOLIC BLOOD PRESSURE: 78 MMHG | TEMPERATURE: 97.6 F | BODY MASS INDEX: 31.99 KG/M2 | WEIGHT: 192 LBS | HEIGHT: 65 IN | RESPIRATION RATE: 16 BRPM

## 2017-02-13 DIAGNOSIS — H61.22 HEARING LOSS DUE TO CERUMEN IMPACTION, LEFT: ICD-10-CM

## 2017-02-13 PROCEDURE — 69210 REMOVE IMPACTED EAR WAX UNI: CPT | Performed by: FAMILY MEDICINE

## 2017-02-13 NOTE — MR AVS SNAPSHOT
"Derek Guzman   2017 3:20 PM   Office Visit   MRN: [id number]    Department:  [facility]   Dept Phone:  [phone]    Description:  Female : 1950   Provider:  Lise Thrasher D.O.           Reason for Visit     Other L ear plugged, diff hearing. L shoulder problem      Allergies as of 2017     Allergen Noted Reactions    Tape 06/10/2011   Rash    blisters      Vital Signs     Blood Pressure Pulse Temperature Respirations Height Weight    130/78 mmHg 66 36.4 °C (97.6 °F) 16 1.651 m (5' 5\") 87.091 kg (192 lb)    Body Mass Index Oxygen Saturation Smoking Status             31.95 kg/m2 94% Former Smoker         Basic Information     Date Of Birth Sex Race Ethnicity Preferred Language    1950 Female White Non- English      Problem List              ICD-10-CM Priority Class Noted - Resolved    Allergic rhinitis    10/1/2009 - Present    History of colonoscopy with polypectomy Z98.890, Z86.010   2010 - Present    History of melanoma Z85.820   10/10/2013 - Present    Degenerative disc disease, lumbar M51.36   2014 - Present    Sigmoid diverticulosis K57.30   2016 - Present    Displaced pilon fracture of tibia S82.873A   2017 - Present    Closed fracture of shaft of left tibia and fibula S82.202A, S82.402A   2017 - Present    Tibia fracture S82.209A   2017 - Present      Health Maintenance        Date Due Completion Dates    IMM INFLUENZA (1) 2016 ---    PAP SMEAR 10/3/2016 10/3/2013    MAMMOGRAM 2017, 4/3/2015, 2013, 2012, 2011, 2009, 2009, 2008, 2008, 2008, 2007, 2007, 2006, 2005, 10/13/2005    IMM PNEUMOCOCCAL 65+ (ADULT) LOW/MEDIUM RISK SERIES (2 of 2 - PCV13) 2017    BONE DENSITY 2021, 2009    COLONOSCOPY 2021    IMM DTaP/Tdap/Td Vaccine (2 - Td) 2023            Current Immunizations     Pneumococcal " polysaccharide vaccine (PPSV-23) 12/11/2016    SHINGLES VACCINE 5/20/2016 10:20 AM    Tdap Vaccine 4/5/2013      Below and/or attached are the medications your provider expects you to take. Review all of your home medications and newly ordered medications with your provider and/or pharmacist. Follow medication instructions as directed by your provider and/or pharmacist. Please keep your medication list with you and share with your provider. Update the information when medications are discontinued, doses are changed, or new medications (including over-the-counter products) are added; and carry medication information at all times in the event of emergency situations     Allergies:  TAPE - Rash               Medications  Valid as of: February 13, 2017 -  4:07 PM    Generic Name Brand Name Tablet Size Instructions for use    Calcium Citrate-Vitamin D   Take 1 Tab by mouth every day.        Cetirizine HCl (Tab) ZYRTEC 10 MG Take 10 mg by mouth 2 times a day.        DiphenhydrAMINE HCl (Tab) BENADRYL 25 MG Take 25 mg by mouth at bedtime as needed for Sleep.        Multiple Vitamin   Take 1 Tab by mouth every day.        Oxycodone-Acetaminophen (Tab) PERCOCET-10  MG Take 0.5-1 Tabs by mouth every four hours as needed for Moderate Pain.        .                 Medicines prescribed today were sent to:     [facility] [address] [place] [address], [place] - [facility] [place] AT [facility] [address] [place] [address] [place] [address]    Phone: [phone] Fax: [phone]    Open 24 Hours?: No      Medication refill instructions:       If your prescription bottle indicates you have medication refills left, it is not necessary to call your provider’s office. Please contact your pharmacy and they will refill your medication.    If your prescription bottle indicates you do not have any refills left, you may request refills at any time through one of the following ways: The online [employer] system (except Urgent Care), by  calling your provider’s office, or by asking your pharmacy to contact your provider’s office with a refill request. Medication refills are processed only during regular business hours and may not be available until the next business day. Your provider may request additional information or to have a follow-up visit with you prior to refilling your medication.   *Please Note: Medication refills are assigned a new Rx number when refilled electronically. Your pharmacy may indicate that no refills were authorized even though a new prescription for the same medication is available at the pharmacy. Please request the medicine by name with the pharmacy before contacting your provider for a refill.           [employer] Access Code: Activation code not generated  Current [employer] Status: Active

## 2017-02-14 NOTE — PROGRESS NOTES
Subjective:     Chief Complaint   Patient presents with   • Other     L ear plugged, diff hearing. L shoulder problem       Derek Guzman is a 66 y.o. female here today for left ear fullness and diminished hearing. Patient states that for approximately 2 weeks she has had diminished hearing in the left ear. She states that she feels that there is fullness in the ear as well. Patient denies pain, patient denies upper respiratory complaints such as runny nose, fever, chills, nasal discharge, or cough.    Allergies   Allergen Reactions   • Tape Rash     blisters     Current medicines (including changes today)  Current Outpatient Prescriptions   Medication Sig Dispense Refill   • oxycodone-acetaminophen (PERCOCET-10)  MG Tab Take 0.5-1 Tabs by mouth every four hours as needed for Moderate Pain. 50 Tab 0   • diphenhydrAMINE (BENADRYL) 25 MG Tab Take 25 mg by mouth at bedtime as needed for Sleep.     • Calcium Carbonate-Vitamin D (CALCIUM + D PO) Take 1 Tab by mouth every day.     • Multiple Vitamin (MULTI-VITAMIN PO) Take 1 Tab by mouth every day.     • cetirizine (ZYRTEC) 10 MG TABS Take 10 mg by mouth 2 times a day.       No current facility-administered medications for this visit.     Social History   Substance Use Topics   • Smoking status: Former Smoker -- 0.50 packs/day for 7 years     Quit date: 1978   • Smokeless tobacco: Never Used      Comment: avoid all tobacco products   • Alcohol Use: No     Family Status   Relation Status Death Age   • Mother     • Father       Family History   Problem Relation Age of Onset   • Diabetes     • Heart Disease     • Cancer     • Cancer Mother      colon cancer twice, lung cancer   • Diabetes Mother    • Heart Disease Father      mi   • Diabetes Father      She    has a past medical history of Infectious disease; Allergic rhinitis (10/1/2009); Pain; and Sigmoid diverticulosis (2016).        ROS   GEN: no weight loss, fevers, or chills  HEENT: no  "blurry vision or change in vision, no ear pain, no difficulty swallowing, no throat pain, no runny nose, no nasal congestion  Resp: no shortness of breath, no cough  CV: no racing heart, no irregular beats, no chest pain     Objective:     Blood pressure 130/78, pulse 66, temperature 36.4 °C (97.6 °F), resp. rate 16, height 1.651 m (5' 5\"), weight 87.091 kg (192 lb), SpO2 94 %. Body mass index is 31.95 kg/(m^2).  Physical Exam:  Constitutional: Alert, no distress.  Skin: Warm, dry, good turgor, no rashes in visible areas.  Eye: Equal, round and reactive, conjunctiva clear, lids normal.  ENMT: Lips without lesions, good dentition, oropharynx non-erythematous, no exudate, moist oral mucosa, bilateral tympanic membranes: No bulging, no retraction, no fluid, nonerythematous, positive light reflex, R external auditory canals: Clear, scant cerumen, nonerythematous, left external auditory canal: Significant cerumen impaction, flushed with several courses of warm water and curettage used to remove cerumen impaction. Patient tolerated well without any complaints.  Neck: Trachea midline, no masses, no thyromegaly. No cervical or supraclavicular lymphadenopathy. Full ROM  Respiratory: Unlabored respiratory effort, good air movement, lungs clear to auscultation, no wheezes, no ronchi.  Cardiovascular:RRR, +S1, S2, no murmur, no peripheral edema, pedal and radial pulses equal and intact bilat      Assessment and Plan:   The following treatment plan was discussed    1. Hearing loss due to cerumen impaction, left    New problem: As above patient tolerated curettage and lavage well. Impaction removed.      Followup: Return if symptoms worsen or fail to improve.    Please note that this dictation was created using voice recognition software. I have made every reasonable attempt to correct obvious errors, but I expect that there are errors of grammar and possibly content that I did not discover before finalizing the note.           "

## 2017-07-31 ENCOUNTER — OFFICE VISIT (OUTPATIENT)
Dept: MEDICAL GROUP | Facility: PHYSICIAN GROUP | Age: 67
End: 2017-07-31
Payer: COMMERCIAL

## 2017-07-31 VITALS
DIASTOLIC BLOOD PRESSURE: 72 MMHG | WEIGHT: 185 LBS | HEART RATE: 72 BPM | TEMPERATURE: 97.5 F | SYSTOLIC BLOOD PRESSURE: 124 MMHG | BODY MASS INDEX: 30.82 KG/M2 | RESPIRATION RATE: 16 BRPM | HEIGHT: 65 IN | OXYGEN SATURATION: 97 %

## 2017-07-31 DIAGNOSIS — M19.042 PRIMARY OSTEOARTHRITIS OF BOTH HANDS: ICD-10-CM

## 2017-07-31 DIAGNOSIS — B35.1 ONYCHOMYCOSIS: ICD-10-CM

## 2017-07-31 DIAGNOSIS — E66.9 OBESITY (BMI 30-39.9): ICD-10-CM

## 2017-07-31 DIAGNOSIS — M19.041 PRIMARY OSTEOARTHRITIS OF BOTH HANDS: ICD-10-CM

## 2017-07-31 PROBLEM — S82.202A CLOSED FRACTURE OF SHAFT OF LEFT TIBIA AND FIBULA: Status: RESOLVED | Noted: 2017-01-20 | Resolved: 2017-07-31

## 2017-07-31 PROBLEM — S82.402A CLOSED FRACTURE OF SHAFT OF LEFT TIBIA AND FIBULA: Status: RESOLVED | Noted: 2017-01-20 | Resolved: 2017-07-31

## 2017-07-31 PROBLEM — M85.89 OSTEOPENIA OF MULTIPLE SITES: Status: ACTIVE | Noted: 2017-07-31

## 2017-07-31 PROBLEM — S82.209A TIBIA FRACTURE: Status: RESOLVED | Noted: 2017-01-20 | Resolved: 2017-07-31

## 2017-07-31 PROBLEM — S82.873A: Status: RESOLVED | Noted: 2017-01-20 | Resolved: 2017-07-31

## 2017-07-31 PROCEDURE — 99214 OFFICE O/P EST MOD 30 MIN: CPT | Performed by: FAMILY MEDICINE

## 2017-07-31 NOTE — PROGRESS NOTES
Subjective:     Chief Complaint   Patient presents with   • Nail Problem     Left nig toe   • Arthritis       Derek Guzman is a 66 y.o. female here today for L toe nail discoloration and bilat finger joint pain.     Pt reports R great toe nail discoloration s/p boot for fracture.  Pt denies discharge or pain.     Pt types all day.  She reports bilat index finger PIP pain and swelling.     Allergies   Allergen Reactions   • Tape Rash     blisters     Current medicines (including changes today)  Current Outpatient Prescriptions   Medication Sig Dispense Refill   • Terbinafine (LAMISIL ADVANCED) 1 % Gel 1 Application by Apply externally route 2 Times a Day. 1 Tube 3   • diphenhydrAMINE (BENADRYL) 25 MG Tab Take 25 mg by mouth at bedtime as needed for Sleep.     • Calcium Carbonate-Vitamin D (CALCIUM + D PO) Take 1 Tab by mouth every day.     • cetirizine (ZYRTEC) 10 MG TABS Take 10 mg by mouth 2 times a day.       No current facility-administered medications for this visit.     Social History   Substance Use Topics   • Smoking status: Former Smoker -- 0.50 packs/day for 7 years     Quit date: 1978   • Smokeless tobacco: Never Used      Comment: avoid all tobacco products   • Alcohol Use: No     Family Status   Relation Status Death Age   • Mother     • Father       Family History   Problem Relation Age of Onset   • Diabetes     • Heart Disease     • Cancer     • Cancer Mother      colon cancer twice, lung cancer   • Diabetes Mother    • Heart Disease Father      mi   • Diabetes Father      She    has a past medical history of Infectious disease; Allergic rhinitis (10/1/2009); Pain; and Sigmoid diverticulosis (2016).        ROS   GEN: no weight loss, fevers, or chills  HEENT: no blurry vision or change in vision  Resp: no shortness of breath, no cough  CV: no racing heart, no irregular beats, no chest pain  Abd: no nausea, no vomiting, no diarrhea, no constipation, no blood in stool, no dark  "stools, no incontinence  MSK: no muscle aches, finger joint pain, no limited motion         Objective:     Blood pressure 124/72, pulse 72, temperature 36.4 °C (97.5 °F), resp. rate 16, height 1.651 m (5' 5\"), weight 83.915 kg (185 lb), SpO2 97 %. Body mass index is 30.79 kg/(m^2).   Physical Exam:  Constitutional: Alert, no distress.  Skin: Warm, dry, good turgor, L great toe nail discoloration: yellowing and thickening   Eye: Equal, round and reactive, conjunctiva clear, lids normal.  ENMT: Lips without lesions, oropharynx non-erythematous, no exudate, moist oral mucosa  Neck: Trachea midline, no masses, no thyromegaly. No cervical or supraclavicular lymphadenopathy. Full ROM  Respiratory: Unlabored respiratory effort, good air movement, lungs clear to auscultation, no wheezes, no ronchi.  Cardiovascular:RRR, +S1, S2, no murmur, no peripheral edema, pedal and radial pulses equal and intact bilat  Abdomen: Soft, non-tender, no masses, no hepatosplenomegaly.  MSK:5/5 muscle strength in upper extremities as well as lower extremity bilaterally, Bilat index finger PIP swelling without warmth or erythema  Psych: Alert and oriented, appropriate affect and mood.  Neuro: CN2-12 intact, no gross motor or sensory deficits      Assessment and Plan:   The following treatment plan was discussed  1. Primary osteoarthritis of both hands  New diagnosis: recommend heat and ice PRN with NSAIDs and Tylenol prn    2. Onychomycosis  New diagnosis: RECOMMEND topical medicaiton  - Terbinafine (LAMISIL ADVANCED) 1 % Gel; 1 Application by Apply externally route 2 Times a Day.  Dispense: 1 Tube; Refill: 3    3. Obesity (BMI 30-39.9)  Pt educated on the increase of morbidity and mortality associated with excess weight including DM, Heart Disease, HTN, stroke, and sleep apnea.  Pt advised weight loss of 5% through reduced calorie, low fat diet and 150 mins of exercise a week  - Patient identified as having weight management issue.  " Appropriate orders and counseling given.        Followup: Return in about 6 months (around 1/31/2018) for chonric pain.    Please note that this dictation was created using voice recognition software. I have made every reasonable attempt to correct obvious errors, but I expect that there are errors of grammar and possibly content that I did not discover before finalizing the note.

## 2017-07-31 NOTE — PATIENT INSTRUCTIONS
Osteoarthritis  Osteoarthritis is a disease that causes soreness and inflammation of a joint. It occurs when the cartilage at the affected joint wears down. Cartilage acts as a cushion, covering the ends of bones where they meet to form a joint. Osteoarthritis is the most common form of arthritis. It often occurs in older people. The joints affected most often by this condition include those in the:  · Ends of the fingers.  · Thumbs.  · Neck.  · Lower back.  · Knees.  · Hips.  CAUSES   Over time, the cartilage that covers the ends of bones begins to wear away. This causes bone to rub on bone, producing pain and stiffness in the affected joints.   RISK FACTORS  Certain factors can increase your chances of having osteoarthritis, including:  · Older age.  · Excessive body weight.  · Overuse of joints.  · Previous joint injury.  SIGNS AND SYMPTOMS   · Pain, swelling, and stiffness in the joint.  · Over time, the joint may lose its normal shape.  · Small deposits of bone (osteophytes) may grow on the edges of the joint.  · Bits of bone or cartilage can break off and float inside the joint space. This may cause more pain and damage.  DIAGNOSIS   Your health care provider will do a physical exam and ask about your symptoms. Various tests may be ordered, such as:  · X-rays of the affected joint.  · Blood tests to rule out other types of arthritis.  Additional tests may be used to diagnose your condition.  TREATMENT   Goals of treatment are to control pain and improve joint function. Treatment plans may include:  · A prescribed exercise program that allows for rest and joint relief.  · A weight control plan.  · Pain relief techniques, such as:  ¨ Properly applied heat and cold.  ¨ Electric pulses delivered to nerve endings under the skin (transcutaneous electrical nerve stimulation [TENS]).  ¨ Massage.  ¨ Certain nutritional supplements.  · Medicines to control pain, such as:  ¨ Acetaminophen.  ¨ Nonsteroidal  anti-inflammatory drugs (NSAIDs), such as naproxen.  ¨ Narcotic or central-acting agents, such as tramadol.  ¨ Corticosteroids. These can be given orally or as an injection.  · Surgery to reposition the bones and relieve pain (osteotomy) or to remove loose pieces of bone and cartilage. Joint replacement may be needed in advanced states of osteoarthritis.  HOME CARE INSTRUCTIONS   · Take medicines only as directed by your health care provider.  · Maintain a healthy weight. Follow your health care provider's instructions for weight control. This may include dietary instructions.  · Exercise as directed. Your health care provider can recommend specific types of exercise. These may include:  ¨ Strengthening exercises. These are done to strengthen the muscles that support joints affected by arthritis. They can be performed with weights or with exercise bands to add resistance.  ¨ Aerobic activities. These are exercises, such as brisk walking or low-impact aerobics, that get your heart pumping.  ¨ Range-of-motion activities. These keep your joints limber.  ¨ Balance and agility exercises. These help you maintain daily living skills.  · Rest your affected joints as directed by your health care provider.  · Keep all follow-up visits as directed by your health care provider.  SEEK MEDICAL CARE IF:   · Your skin turns red.  · You develop a rash in addition to your joint pain.  · You have worsening joint pain.  · You have a fever along with joint or muscle aches.  SEEK IMMEDIATE MEDICAL CARE IF:  · You have a significant loss of weight or appetite.  · You have night sweats.  FOR MORE INFORMATION   · National [place] of Arthritis and Musculoskeletal and Skin Diseases: www.niams.nih.gov  · National [place] on Aging: www.eunice.nih.gov  · American College of Rheumatology: www.rheumatology.org     This information is not intended to replace advice given to you by your health care provider. Make sure you discuss any questions you  have with your health care provider.     Document Released: 12/18/2006 Document Revised: 01/08/2016 Document Reviewed: 08/25/2014  [employer]vier Interactive Patient Education ©2016 [employer]

## 2017-07-31 NOTE — MR AVS SNAPSHOT
"Derek Guzamn   2017 8:40 AM   Office Visit   MRN: [id number]    Department:  [facility]   Dept Phone:  [phone]    Description:  Female : 1950   Provider:  Lise Thrasher D.O.           Reason for Visit     Nail Problem Left nig toe    Arthritis           Allergies as of 2017     Allergen Noted Reactions    Tape 06/10/2011   Rash    blisters      You were diagnosed with     Primary osteoarthritis of both hands   [6444242]       Onychomycosis   [[id number]]         Vital Signs     Blood Pressure Pulse Temperature Respirations Height Weight    124/72 mmHg 72 36.4 °C (97.5 °F) 16 1.651 m (5' 5\") 83.915 kg (185 lb)    Body Mass Index Oxygen Saturation Smoking Status             30.79 kg/m2 97% Former Smoker         Basic Information     Date Of Birth Sex Race Ethnicity Preferred Language    1950 Female White Non- English      Problem List              ICD-10-CM Priority Class Noted - Resolved    Allergic rhinitis    10/1/2009 - Present    History of colonoscopy with polypectomy Z98.890, Z86.010   2010 - Present    History of melanoma Z85.820   10/10/2013 - Present    Degenerative disc disease, lumbar M51.36   2014 - Present    Sigmoid diverticulosis K57.30   2016 - Present    Osteopenia of multiple sites M85.89   2017 - Present      Health Maintenance        Date Due Completion Dates    PAP SMEAR 10/3/2016 10/3/2013    MAMMOGRAM 2017, 4/3/2015, 2013, 2012, 2011, 2009, 2009, 2008, 2008, 2008, 2007, 2007, 2006, 2005, 10/13/2005    IMM INFLUENZA (1) 2017 ---    IMM PNEUMOCOCCAL 65+ (ADULT) LOW/MEDIUM RISK SERIES (2 of 2 - PCV13) 2017    BONE DENSITY 2021, 2009    COLONOSCOPY 2021    IMM DTaP/Tdap/Td Vaccine (2 - Td) 2023            Current Immunizations     Pneumococcal polysaccharide vaccine (PPSV-23) 2016   " SHINGLES VACCINE 5/20/2016 10:20 AM    Tdap Vaccine 4/5/2013      Below and/or attached are the medications your provider expects you to take. Review all of your home medications and newly ordered medications with your provider and/or pharmacist. Follow medication instructions as directed by your provider and/or pharmacist. Please keep your medication list with you and share with your provider. Update the information when medications are discontinued, doses are changed, or new medications (including over-the-counter products) are added; and carry medication information at all times in the event of emergency situations     Allergies:  TAPE - Rash               Medications  Valid as of: July 31, 2017 - 10:19 AM    Generic Name Brand Name Tablet Size Instructions for use    Calcium Citrate-Vitamin D   Take 1 Tab by mouth every day.        Cetirizine HCl (Tab) ZYRTEC 10 MG Take 10 mg by mouth 2 times a day.        DiphenhydrAMINE HCl (Tab) BENADRYL 25 MG Take 25 mg by mouth at bedtime as needed for Sleep.        Multiple Vitamin   Take 1 Tab by mouth every day.        Oxycodone-Acetaminophen (Tab) PERCOCET-10  MG Take 0.5-1 Tabs by mouth every four hours as needed for Moderate Pain.        Terbinafine (Gel) Terbinafine 1 % 1 Application by Apply externally route 2 Times a Day.        .                 Medicines prescribed today were sent to:     [facility] [address] [place] [address] [address] [place] - [address] [facility]    [address] [place] [address]    Phone: [phone] Fax: [phone]    Open 24 Hours?: No      Medication refill instructions:       If your prescription bottle indicates you have medication refills left, it is not necessary to call your provider’s office. Please contact your pharmacy and they will refill your medication.    If your prescription bottle indicates you do not have any refills left, you may request refills at any time through one of the following ways: The online  [employer] system (except Urgent Care), by calling your provider’s office, or by asking your pharmacy to contact your provider’s office with a refill request. Medication refills are processed only during regular business hours and may not be available until the next business day. Your provider may request additional information or to have a follow-up visit with you prior to refilling your medication.   *Please Note: Medication refills are assigned a new Rx number when refilled electronically. Your pharmacy may indicate that no refills were authorized even though a new prescription for the same medication is available at the pharmacy. Please request the medicine by name with the pharmacy before contacting your provider for a refill.        Instructions    Osteoarthritis  Osteoarthritis is a disease that causes soreness and inflammation of a joint. It occurs when the cartilage at the affected joint wears down. Cartilage acts as a cushion, covering the ends of bones where they meet to form a joint. Osteoarthritis is the most common form of arthritis. It often occurs in older people. The joints affected most often by this condition include those in the:  · Ends of the fingers.  · Thumbs.  · Neck.  · Lower back.  · Knees.  · Hips.  CAUSES   Over time, the cartilage that covers the ends of bones begins to wear away. This causes bone to rub on bone, producing pain and stiffness in the affected joints.   RISK FACTORS  Certain factors can increase your chances of having osteoarthritis, including:  · Older age.  · Excessive body weight.  · Overuse of joints.  · Previous joint injury.  SIGNS AND SYMPTOMS   · Pain, swelling, and stiffness in the joint.  · Over time, the joint may lose its normal shape.  · Small deposits of bone (osteophytes) may grow on the edges of the joint.  · Bits of bone or cartilage can break off and float inside the joint space. This may cause more pain and damage.  DIAGNOSIS   Your health care provider  will do a physical exam and ask about your symptoms. Various tests may be ordered, such as:  · X-rays of the affected joint.  · Blood tests to rule out other types of arthritis.  Additional tests may be used to diagnose your condition.  TREATMENT   Goals of treatment are to control pain and improve joint function. Treatment plans may include:  · A prescribed exercise program that allows for rest and joint relief.  · A weight control plan.  · Pain relief techniques, such as:  ¨ Properly applied heat and cold.  ¨ Electric pulses delivered to nerve endings under the skin (transcutaneous electrical nerve stimulation [TENS]).  ¨ Massage.  ¨ Certain nutritional supplements.  · Medicines to control pain, such as:  ¨ Acetaminophen.  ¨ Nonsteroidal anti-inflammatory drugs (NSAIDs), such as naproxen.  ¨ Narcotic or central-acting agents, such as tramadol.  ¨ Corticosteroids. These can be given orally or as an injection.  · Surgery to reposition the bones and relieve pain (osteotomy) or to remove loose pieces of bone and cartilage. Joint replacement may be needed in advanced states of osteoarthritis.  HOME CARE INSTRUCTIONS   · Take medicines only as directed by your health care provider.  · Maintain a healthy weight. Follow your health care provider's instructions for weight control. This may include dietary instructions.  · Exercise as directed. Your health care provider can recommend specific types of exercise. These may include:  ¨ Strengthening exercises. These are done to strengthen the muscles that support joints affected by arthritis. They can be performed with weights or with exercise bands to add resistance.  ¨ Aerobic activities. These are exercises, such as brisk walking or low-impact aerobics, that get your heart pumping.  ¨ Range-of-motion activities. These keep your joints limber.  ¨ Balance and agility exercises. These help you maintain daily living skills.  · Rest your affected joints as directed by your  health care provider.  · Keep all follow-up visits as directed by your health care provider.  SEEK MEDICAL CARE IF:   · Your skin turns red.  · You develop a rash in addition to your joint pain.  · You have worsening joint pain.  · You have a fever along with joint or muscle aches.  SEEK IMMEDIATE MEDICAL CARE IF:  · You have a significant loss of weight or appetite.  · You have night sweats.  FOR MORE INFORMATION   · National [place] of Arthritis and Musculoskeletal and Skin Diseases: www.niams.nih.gov  · National [place] on Aging: www.eunice.nih.gov  · American College of Rheumatology: www.rheumatology.org     This information is not intended to replace advice given to you by your health care provider. Make sure you discuss any questions you have with your health care provider.     Document Released: 12/18/2006 Document Revised: 01/08/2016 Document Reviewed: 08/25/2014  [employer] Interactive Patient Education ©2016 [employer]            [employer] Access Code: Activation code not generated  Current [employer] Status: Active

## 2017-09-11 ENCOUNTER — HOSPITAL ENCOUNTER (OUTPATIENT)
Dept: LAB | Facility: MEDICAL CENTER | Age: 67
End: 2017-09-11
Attending: NURSE PRACTITIONER
Payer: COMMERCIAL

## 2017-09-11 ENCOUNTER — OFFICE VISIT (OUTPATIENT)
Dept: MEDICAL GROUP | Facility: MEDICAL CENTER | Age: 67
End: 2017-09-11
Payer: COMMERCIAL

## 2017-09-11 VITALS
HEIGHT: 65 IN | SYSTOLIC BLOOD PRESSURE: 124 MMHG | HEART RATE: 65 BPM | TEMPERATURE: 98.7 F | BODY MASS INDEX: 30.82 KG/M2 | OXYGEN SATURATION: 97 % | DIASTOLIC BLOOD PRESSURE: 66 MMHG | WEIGHT: 185 LBS | RESPIRATION RATE: 16 BRPM

## 2017-09-11 DIAGNOSIS — L65.9 HAIR LOSS: ICD-10-CM

## 2017-09-11 LAB — TSH SERPL DL<=0.005 MIU/L-ACNC: 0.87 UIU/ML (ref 0.3–3.7)

## 2017-09-11 PROCEDURE — 99213 OFFICE O/P EST LOW 20 MIN: CPT | Performed by: NURSE PRACTITIONER

## 2017-09-11 PROCEDURE — 36415 COLL VENOUS BLD VENIPUNCTURE: CPT

## 2017-09-11 PROCEDURE — 84443 ASSAY THYROID STIM HORMONE: CPT

## 2017-09-11 NOTE — PROGRESS NOTES
Subjective:      Derek Guzman is a 66 y.o. female who presents with Hair/Scalp Problem (loosing hair)            HPI Derek GuzmanIs here today same day visit for diffuse hair loss.    Patient reports that for a few months she has noticed general hair loss. She states she tried over-the-counter Selsun Blue as recommended by her PCP and it only seemed to help very slightly. She continues to have hair loss and would like to try something else. Review of her last blood work appears normal and she reports no patchy areas on her scalp or persistent pruritus. She denies rash of the skin. She denies chemical exposure to the scalp. She states she otherwise feels generally well. Her last TSH from October was normal.        Current Outpatient Prescriptions   Medication Sig Dispense Refill   • Terbinafine (LAMISIL ADVANCED) 1 % Gel 1 Application by Apply externally route 2 Times a Day. 1 Tube 3   • diphenhydrAMINE (BENADRYL) 25 MG Tab Take 25 mg by mouth at bedtime as needed for Sleep.     • Calcium Carbonate-Vitamin D (CALCIUM + D PO) Take 1 Tab by mouth every day.     • cetirizine (ZYRTEC) 10 MG TABS Take 10 mg by mouth 2 times a day.       No current facility-administered medications for this visit.      Social History   Substance Use Topics   • Smoking status: Former Smoker     Packs/day: 0.50     Years: 7.00     Types: Cigarettes     Quit date: 1/1/1978   • Smokeless tobacco: Never Used      Comment: avoid all tobacco products   • Alcohol use No     Past Medical History:   Diagnosis Date   • Sigmoid diverticulosis 8/17/2016   • Allergic rhinitis 10/1/2009   • Infectious disease     pt works at [employer]   • Pain     left foot     Family History   Problem Relation Age of Onset   • Cancer Mother      colon cancer twice, lung cancer   • Diabetes Mother    • Heart Disease Father      mi   • Diabetes Father    • Diabetes     • Heart Disease     • Cancer         Review of Systems   All other systems reviewed and are  "negative.         Objective:     /66   Pulse 65   Temp 37.1 °C (98.7 °F)   Resp 16   Ht 1.651 m (5' 5\")   Wt 83.9 kg (185 lb)   SpO2 97%   BMI 30.79 kg/m²      Physical Exam   Constitutional: She is oriented to person, place, and time. She appears well-developed and well-nourished. No distress.   HENT:   Head: Normocephalic and atraumatic.   Right Ear: External ear normal.   Left Ear: External ear normal.   Nose: Nose normal.   Eyes: Right eye exhibits no discharge. Left eye exhibits no discharge.   Neck: Normal range of motion. Neck supple. No thyromegaly present.   Cardiovascular: Normal rate, regular rhythm and normal heart sounds.  Exam reveals no gallop and no friction rub.    No murmur heard.  Pulmonary/Chest: Effort normal and breath sounds normal. She has no wheezes. She has no rales.   Musculoskeletal: She exhibits no edema or tenderness.   Neurological: She is alert and oriented to person, place, and time. She displays normal reflexes.   Skin: Skin is warm and dry. No rash noted. She is not diaphoretic.   Diffuse thinning of the hair with no patchy areas or obvious rash.   Psychiatric: She has a normal mood and affect. Her behavior is normal. Judgment and thought content normal.   Nursing note and vitals reviewed.              Assessment/Plan:     1. Hair loss  Patient has alreadyTried over-the-counter dandruff shampoos which did not help so I will have her try over-the-counter ketoconazole shampoo. I will repeat a TSH. Assuming this does not work, I have referred her to dermatology for further evaluation and then she will follow back with her PCP. She declined flu shot today and I reminded her on her need for Pap smear and mammogram in the future.  - REFERRAL TO DERMATOLOGY  - TSH; Future      "

## 2017-10-03 ENCOUNTER — OFFICE VISIT (OUTPATIENT)
Dept: DERMATOLOGY | Facility: IMAGING CENTER | Age: 67
End: 2017-10-03
Payer: COMMERCIAL

## 2017-10-03 DIAGNOSIS — L21.9 SEBORRHEIC DERMATITIS: ICD-10-CM

## 2017-10-03 DIAGNOSIS — Z85.820 HISTORY OF MELANOMA: ICD-10-CM

## 2017-10-03 DIAGNOSIS — L65.0 TELOGEN EFFLUVIUM: ICD-10-CM

## 2017-10-03 PROCEDURE — 99203 OFFICE O/P NEW LOW 30 MIN: CPT | Performed by: DERMATOLOGY

## 2017-10-03 RX ORDER — FLUOCINONIDE GEL 0.5 MG/G
1 GEL TOPICAL 2 TIMES DAILY
Qty: 60 G | Refills: 2 | Status: SHIPPED | OUTPATIENT
Start: 2017-10-03 | End: 2017-11-13 | Stop reason: SDUPTHER

## 2017-10-03 ASSESSMENT — ENCOUNTER SYMPTOMS
FEVER: 0
WEIGHT LOSS: 0
CHILLS: 0

## 2017-10-03 NOTE — PROGRESS NOTES
"Dermatology New Patient Visit    Chief Complaint   Patient presents with   • Establish Care       Subjective:     HPI:   Derek Guzman is a 66 y.o. female presenting for    Hair loss   - started 4 months ago  - started slow, then progressively increased in amount of hair lost  - comes out most prominently in the shower, when she is brushing the hair  - falls out in large amounts, from the entire scalp  - no areas of patchy loss  - feels that she has \"1/3\" the amount of hair she used to have  - at the end of January 2017, she had left tibia and fibular shaft fractures which require surgical correction     Also notes itching of the scalp, but diffusely over the entire head  - notes flaking, redness  - has been using Ketoconazole 1% shampoo 4x week, does not leave on for longer than ~1-2minutes   Additionally: has been taking vitamin- d and calcium on a daily basis    Notes h/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     • Calcium Carbonate-Vitamin D (CALCIUM + D PO) Take 1 Tab by mouth every day.     • cetirizine (ZYRTEC) 10 MG TABS Take 10 mg by mouth 2 times a day.     • Terbinafine (LAMISIL ADVANCED) 1 % Gel 1 Application by Apply externally route 2 Times a Day. 1 Tube 3     No current facility-administered medications on file prior to visit.        Allergies   Allergen Reactions   • Tape Rash     blisters       Family History   Problem Relation Age of Onset   • Cancer " Mother      colon cancer twice, lung cancer   • Diabetes Mother    • Heart Disease Father      mi   • Diabetes Father    • Diabetes     • Heart Disease     • Cancer         Social History     Social History   • Marital status:      Spouse name: N/A   • Number of children: N/A   • Years of education: N/A     Occupational History   • Not on file.     Social History Main Topics   • Smoking status: Former Smoker     Packs/day: 0.50     Years: 7.00     Types: Cigarettes     Quit date: 1/1/1978   • Smokeless tobacco: Never Used      Comment: avoid all tobacco products   • Alcohol use No   • Drug use: No   • Sexual activity: Yes     Partners: Male     Other Topics Concern   • Not on file     Social History Narrative   • No narrative on file       Review of Systems   Constitutional: Negative for chills, fever, malaise/fatigue and weight loss.   Skin: Positive for itching and rash.   All other systems reviewed and are negative.       Objective:     A focused cutaneous exam was completed including: scalp, hair, ears, face, eyelids, conjunctiva, lips, neck, upper extremities (including hands/fingernails), with the following pertinent findings listed below. All other above-listed examined areas wore within normal limits/without any rashes or skin lesions.    Physical Exam   Constitutional: She is well-developed, well-nourished, and in no distress.   HENT:   Head: Normocephalic and atraumatic.       Right Ear: External ear normal.   Left Ear: External ear normal.   Eyes: Conjunctivae are normal.   Neck: Normal range of motion.   Psychiatric: Mood and affect normal.       DATA: Hair pull test very weakly positive; three telogen hairs appreciated under microscope examination    Results for ZEINAB KING (MRN [id number]) as of 10/3/2017 09:57   Ref. Range 9/11/2017 08:41   TSH Latest Ref Range: 0.300 - 3.700 uIU/mL 0.870     Results for ZEINAB KING (MRN [id number]) as of 10/3/2017 09:57   Ref. Range 1/16/2017 09:03   WBC  Latest Ref Range: 4.8 - 10.8 K/uL 5.1   RBC Latest Ref Range: 4.20 - 5.40 M/uL 4.87   Hemoglobin Latest Ref Range: 12.0 - 16.0 g/dL 14.6   Hematocrit Latest Ref Range: 37.0 - 47.0 % 45.4   MCV Latest Ref Range: 81.4 - 97.8 fL 93.2   MCH Latest Ref Range: 27.0 - 33.0 pg 30.0   MCHC Latest Ref Range: 33.6 - 35.0 g/dL 32.2 (L)   RDW Latest Ref Range: 35.9 - 50.0 fL 44.1   Platelet Count Latest Ref Range: 164 - 446 K/uL 237   MPV Latest Ref Range: 9.0 - 12.9 fL 11.4   Neutrophils-Polys Latest Ref Range: 44.00 - 72.00 % 55.00   Neutrophils (Absolute) Latest Ref Range: 2.00 - 7.15 K/uL 2.80   Lymphocytes Latest Ref Range: 22.00 - 41.00 % 31.40   Lymphs (Absolute) Latest Ref Range: 1.00 - 4.80 K/uL 1.60   Monocytes Latest Ref Range: 0.00 - 13.40 % 7.50   Monos (Absolute) Latest Ref Range: 0.00 - 0.85 K/uL 0.38   Eosinophils Latest Ref Range: 0.00 - 6.90 % 4.90   Eos (Absolute) Latest Ref Range: 0.00 - 0.51 K/uL 0.25   Basophils Latest Ref Range: 0.00 - 1.80 % 1.00   Baso (Absolute) Latest Ref Range: 0.00 - 0.12 K/uL 0.05   Immature Granulocytes Latest Ref Range: 0.00 - 0.90 % 0.20   Immature Granulocytes (abs) Latest Ref Range: 0.00 - 0.11 K/uL 0.01   Nucleated RBC Latest Units: /100 WBC 0.00   NRBC (Absolute) Latest Units: K/uL 0.00     Results for ZEINAB KING (MRN [id number]) as of 10/3/2017 09:57   Ref. Range 10/26/2015 09:53   25-Hydroxy   Vitamin D 25 Latest Ref Range: 30 - 100 ng/mL 26 (L)       Assessment and Plan:     1. Seborrheic dermatitis - scalp, active  - educated patient about diagnosis, management options, and expectations of treatment  - instructed to continue ketoconazole shampoo (or other pyrithione zinc vs selenium sulfide containing shampoo) 2-3 times/week. Instructed to leave on the scalp for at least 5 minutes prior to rinsing.  - start fluocinonide 0.05% gel daily to BID to affected areas of the scalp when active  - side effects discussed, including skin thinning, appearance of stretch marks  (striae), easy bruising, telangiectasias, and possible increased hair growth     2. 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 She should notice that the rate of hair loss is slowing over the next 3-4 months, but recovery of hair density can take years. If there is absolutely no change in the amount of loss at 1 year post-surgery, we will consider further investigation  - additionally, given the evidence of possible pattern alopecia component, suggested to start Rogaine (minoxidil) 5% (foam or solution) for the scalp daily. Instructions given on how to apply, s/e hypertrichosis discussed  - patient not interested in starting this treatment, but will keep this in mind    3. History of melanoma, no concerning spots to report today  - patient due for her annual NELL, she will schedule a visit within the next 4-6 weeks for this specific issue    Total time spent face-to-face was 30 minutes. Greater than 50% was spent in counseling and coordination of care on etiology of hair loss, management options (optional additional interventions) and expectations of outcome and recommended treatment plans.    Followup: Return in about 4 weeks (around 10/31/2017).    Maite Gonsalez M.D.

## 2017-11-13 ENCOUNTER — HOSPITAL ENCOUNTER (OUTPATIENT)
Facility: MEDICAL CENTER | Age: 67
End: 2017-11-13
Attending: DERMATOLOGY
Payer: COMMERCIAL

## 2017-11-13 ENCOUNTER — OFFICE VISIT (OUTPATIENT)
Dept: DERMATOLOGY | Facility: IMAGING CENTER | Age: 67
End: 2017-11-13
Payer: COMMERCIAL

## 2017-11-13 VITALS — HEIGHT: 65 IN | TEMPERATURE: 98.8 F | BODY MASS INDEX: 30.82 KG/M2 | WEIGHT: 185 LBS

## 2017-11-13 DIAGNOSIS — L82.1 SEBORRHEIC KERATOSES: ICD-10-CM

## 2017-11-13 DIAGNOSIS — D48.5 NEOPLASM OF UNCERTAIN BEHAVIOR OF SKIN: ICD-10-CM

## 2017-11-13 DIAGNOSIS — D18.01 CHERRY ANGIOMA: ICD-10-CM

## 2017-11-13 DIAGNOSIS — Z86.006 HISTORY OF MELANOMA IN SITU: ICD-10-CM

## 2017-11-13 DIAGNOSIS — L73.8 SEBACEOUS HYPERPLASIA: ICD-10-CM

## 2017-11-13 PROCEDURE — 11101 PR BIOPSY, EACH ADDED LESION: CPT | Performed by: DERMATOLOGY

## 2017-11-13 PROCEDURE — 99213 OFFICE O/P EST LOW 20 MIN: CPT | Mod: 25 | Performed by: DERMATOLOGY

## 2017-11-13 PROCEDURE — 88305 TISSUE EXAM BY PATHOLOGIST: CPT | Mod: 59

## 2017-11-13 PROCEDURE — 11100 PR BIOPSY OF SKIN LESION: CPT | Performed by: DERMATOLOGY

## 2017-11-13 RX ORDER — FLUOCINONIDE GEL 0.5 MG/G
1 GEL TOPICAL 2 TIMES DAILY
Qty: 90 G | Refills: 2 | Status: SHIPPED | OUTPATIENT
Start: 2017-11-13 | End: 2018-01-12

## 2017-11-13 ASSESSMENT — ENCOUNTER SYMPTOMS
CONSTITUTIONAL NEGATIVE: 1
WEIGHT LOSS: 0
FEVER: 0
CHILLS: 0

## 2017-11-13 NOTE — PROGRESS NOTES
Dermatology Follow-Up Visit    Chief Complaint   Patient presents with   • Nevus     changing color and shape        Subjective:     HPI:   Derek Guzman is a 66 y.o. female here to discuss the evaluation and management of:    H/o MIS left upper extremity, excised 06/2013  Last total skin exam ~1 year ago  Notes a spot on the right neck that she believes is darkening  Has been present for +1-2 years, but noted recent change  No itching, bleeding, pain    Spot on the left upper arm, close to prior MIS  Notes increase in size  No itching/pain/bleeding    F/u seb derm  Improving  Using fluocinonided 0.05% gel to the area on the scalp, has been very helpful  Denies any itching    Telogen effluvium -- hair loss is slowing  No itching        Review of Systems   Constitutional: Negative.  Negative for chills, fever, malaise/fatigue and weight loss.   Skin: Negative for rash.   All other systems reviewed and are negative.      Allergies   Allergen Reactions   • Tape Rash     blisters       Current medicines (including changes today)  Current Outpatient Prescriptions   Medication Sig Dispense Refill   • fluocinonide (LIDEX) 0.05 % gel Apply 1 Application to affected area(s) 2 Times a Day for 60 days. 60 g 2   • Terbinafine (LAMISIL ADVANCED) 1 % Gel 1 Application by Apply externally route 2 Times a Day. 1 Tube 3   • diphenhydrAMINE (BENADRYL) 25 MG Tab Take 25 mg by mouth at bedtime as needed for Sleep.     • Calcium Carbonate-Vitamin D (CALCIUM + D PO) Take 1 Tab by mouth every day.     • cetirizine (ZYRTEC) 10 MG TABS Take 10 mg by mouth 2 times a day.       No current facility-administered medications for this visit.        Social History   Substance Use Topics   • Smoking status: Former Smoker     Packs/day: 0.50     Years: 7.00     Types: Cigarettes     Quit date: 1/1/1978   • Smokeless tobacco: Never Used      Comment: avoid all tobacco products   • Alcohol use No       Patient Active Problem List    Diagnosis Date  Noted   • Osteopenia of multiple sites 07/31/2017   • Obesity (BMI 30-39.9) 07/31/2017   • Primary osteoarthritis of both hands 07/31/2017   • Onychomycosis 07/31/2017   • Sigmoid diverticulosis 08/17/2016   • Degenerative disc disease, lumbar 09/29/2014   • History of melanoma 10/10/2013   • History of colonoscopy with polypectomy 06/02/2010   • Allergic rhinitis 10/01/2009       Family History   Problem Relation Age of Onset   • Cancer Mother      colon cancer twice, lung cancer   • Diabetes Mother    • Heart Disease Father      mi   • Diabetes Father    • Diabetes     • Heart Disease     • Cancer            Objective:     A full mucocutaneous exam was completed including: scalp, hair, ears, face, eyelids, conjunctiva, lips, gums/tongue/oropharynx neck, chest/breasts, abdomen, upper extremities (including hands/digits/fingernails), lower extremities (including feet/toes/toenails), buttocks, including external genitalia (patient refusal) with the following pertinent findings:    Physical Exam   Constitutional: She is oriented to person, place, and time and well-developed, well-nourished, and in no distress.   HENT:   Head: Normocephalic and atraumatic.       Right Ear: External ear normal.   Left Ear: External ear normal.   Nose: Nose normal.   Mouth/Throat: Oropharynx is clear and moist.   Eyes: Conjunctivae and lids are normal.   Neck: Normal range of motion. Neck supple.   Cardiovascular: Intact distal pulses.    Pulmonary/Chest: Effort normal.   Neurological: She is alert and oriented to person, place, and time.   Skin: Skin is warm and dry.        Psychiatric: Mood and affect normal.       Data: none applicable    Assessment and Plan:     The following treatment plan was discussed:    1. Neoplasm of uncertain behavior of skin - right neck  Procedure Note   Procedure: Biopsy by shave technique  Location: as noted above  Size: as noted in exam  Preoperative diagnosis: compound nevus, r/o atypia  Risks, benefits  and alternatives of procedure discussed and written informed consent obtained. Time out completed. Area of biopsy prepped with alcohol. Anesthesia with 1% lidocaine with epinephrine administered with 30 gauge needle. Shave biopsy of the site performed. Hemostasis achieved with pressure and aluminum chloride. Vaseline applied to wound with bandage. Patient tolerated procedure well and there were no complications. The specimen was sent to the pathology lab by the staff. Wound care was discussed.    2. Neoplasm of uncertain behavior of skin - left upper arm  Procedure Note   Procedure: Biopsy by shave technique  Location: as noted above  Size: as noted in exam  Preoperative diagnosis:compound nevus  Risks, benefits and alternatives of procedure discussed and written informed consent obtained. Time out completed. Area of biopsy prepped with alcohol. Anesthesia with 1% lidocaine with epinephrine administered with 30 gauge needle. Shave biopsy of the site performed. Hemostasis achieved with pressure and aluminum chloride. Vaseline applied to wound with bandage. Patient tolerated procedure well and there were no complications. The specimen was sent to the pathology lab by the staff. Wound care was discussed.    3. History of melanoma in situ  Skin cancer education  - discussed importance of sun protective clothing, eyewear  - discussed importance of daily use of broad spectrum sunscreen with SPF 30 or greater, as well as need for reapplication ~every 2 hours when exposed to UVR  - discussed importance of regular self-exams, ideally once per month, annual exams in clinic  - ABCDE's of melanoma discussed  - patient to bring any new or concerning lesions to my attention    4. Cherry angiomas  - Benign-appearing nature of lesions discussed. Advised to return to clinic for any new or concerning changes.    5. Seborrheic keratoses  - Benign-appearing nature of lesions discussed. Advised to return to clinic for any new or  concerning changes.    6. Sebaceous hyperplasia  - Benign-appearing nature of lesions discussed. Advised to return to clinic for any new or concerning changes.     Followup: Return for NELL 6 mo - 1 year.    Maite Gonsalez M.D.

## 2017-11-14 ENCOUNTER — TELEPHONE (OUTPATIENT)
Dept: DERMATOLOGY | Facility: IMAGING CENTER | Age: 67
End: 2017-11-14

## 2017-12-21 ENCOUNTER — TELEPHONE (OUTPATIENT)
Dept: MEDICAL GROUP | Facility: PHYSICIAN GROUP | Age: 67
End: 2017-12-21

## 2017-12-21 DIAGNOSIS — M25.511 CHRONIC RIGHT SHOULDER PAIN: ICD-10-CM

## 2017-12-21 DIAGNOSIS — G89.29 CHRONIC RIGHT SHOULDER PAIN: ICD-10-CM

## 2017-12-21 NOTE — TELEPHONE ENCOUNTER
Pt is being seen at [facility] by Dr Weinberg and they are needing an updated referral to see him and a referral for a shoulder injection from him.    Please advise. Thank you.

## 2018-01-21 ENCOUNTER — OFFICE VISIT (OUTPATIENT)
Dept: URGENT CARE | Facility: PHYSICIAN GROUP | Age: 68
End: 2018-01-21
Payer: COMMERCIAL

## 2018-01-21 VITALS
OXYGEN SATURATION: 93 % | HEIGHT: 65 IN | TEMPERATURE: 96.9 F | RESPIRATION RATE: 14 BRPM | DIASTOLIC BLOOD PRESSURE: 84 MMHG | HEART RATE: 74 BPM | SYSTOLIC BLOOD PRESSURE: 130 MMHG

## 2018-01-21 DIAGNOSIS — S01.80XA OPEN FOREHEAD WOUND, INITIAL ENCOUNTER: ICD-10-CM

## 2018-01-21 PROCEDURE — 99214 OFFICE O/P EST MOD 30 MIN: CPT | Performed by: NURSE PRACTITIONER

## 2018-01-21 ASSESSMENT — PAIN SCALES - GENERAL: PAINLEVEL: 3=SLIGHT PAIN

## 2018-01-21 ASSESSMENT — ENCOUNTER SYMPTOMS
DIZZINESS: 0
HEADACHES: 1
VOMITING: 0
NECK PAIN: 0
BLURRED VISION: 0
DOUBLE VISION: 0
BACK PAIN: 0
LOSS OF CONSCIOUSNESS: 0
NAUSEA: 0

## 2018-01-22 NOTE — PROGRESS NOTES
Subjective:      Derek Guzman is a 67 y.o. female who presents with Fall (Fell hit head, head laceration, x 1 hour)            HPI New problem. 67 year old female with [facility] She denies LOC, neck pain or back pain. She denies any extremity injuries. She has no dizziness, nausea or vomiting. She does have headache as well as two open wound just above bridge of nose. Bleeding has stopped.  Tape  Current Outpatient Prescriptions on File Prior to Visit   Medication Sig Dispense Refill   • Calcium Carbonate-Vitamin D (CALCIUM + D PO) Take 1 Tab by mouth every day.     • cetirizine (ZYRTEC) 10 MG TABS Take 10 mg by mouth 2 times a day.     • Terbinafine (LAMISIL ADVANCED) 1 % Gel 1 Application by Apply externally route 2 Times a Day. 1 Tube 3   • diphenhydrAMINE (BENADRYL) 25 MG Tab Take 25 mg by mouth at bedtime as needed for Sleep.       No current facility-administered medications on file prior to visit.      Social History     Social History   • Marital status:      Spouse name: N/A   • Number of children: N/A   • Years of education: N/A     Occupational History   • Not on file.     Social History Main Topics   • Smoking status: Former Smoker     Packs/day: 0.50     Years: 7.00     Types: Cigarettes     Quit date: 1/1/1978   • Smokeless tobacco: Never Used      Comment: avoid all tobacco products   • Alcohol use No   • Drug use: No   • Sexual activity: Yes     Partners: Male     Other Topics Concern   • Not on file     Social History Narrative   • No narrative on file     family history includes Cancer in her mother; Diabetes in her father and mother; Heart Disease in her father.      Review of Systems   Eyes: Negative for blurred vision and double vision.   Gastrointestinal: Negative for nausea and vomiting.   Musculoskeletal: Negative for back pain, joint pain and neck pain.   Skin:        Open wound forehead.   Neurological: Positive for headaches. Negative for dizziness  "and loss of consciousness.          Objective:     /84   Pulse 74   Temp 36.1 °C (96.9 °F)   Resp 14   Ht 1.651 m (5' 5\")   SpO2 93%   Breastfeeding? No      Physical Exam   Constitutional: She is oriented to person, place, and time. She appears well-developed and well-nourished. No distress.   HENT:   Head: Normocephalic and atraumatic.   Right Ear: External ear and ear canal normal. Tympanic membrane is not injected and not perforated. No middle ear effusion.   Left Ear: External ear and ear canal normal. Tympanic membrane is not injected and not perforated.  No middle ear effusion.   Nose: No mucosal edema.   Mouth/Throat: No oropharyngeal exudate or posterior oropharyngeal erythema.   Eyes: Conjunctivae and EOM are normal. Pupils are equal, round, and reactive to light. Right eye exhibits no discharge. Left eye exhibits no discharge.   Neck: Normal range of motion. Neck supple.   Cardiovascular: Normal rate, regular rhythm and normal heart sounds.    No murmur heard.  Pulmonary/Chest: Effort normal and breath sounds normal. No respiratory distress.   Musculoskeletal: Normal range of motion.   Normal movement of all 4 extremities.   Lymphadenopathy:     She has no cervical adenopathy.        Right: No supraclavicular adenopathy present.        Left: No supraclavicular adenopathy present.   Neurological: She is alert and oriented to person, place, and time. Gait normal.   Skin: Skin is warm and dry. Laceration noted.        Psychiatric: She has a normal mood and affect. Her behavior is normal. Thought content normal.   Nursing note and vitals reviewed.              Assessment/Plan:     1. Open forehead wound, initial encounter       dermabond placed to wound on left after thorough cleaning.  Wound care and head injury precautions reviewed with patient and . Both have verbalized understanding.  Differential diagnosis, natural history, supportive care, and indications for immediate follow-up " discussed at length.

## 2018-03-29 ENCOUNTER — PATIENT OUTREACH (OUTPATIENT)
Dept: HEALTH INFORMATION MANAGEMENT | Facility: OTHER | Age: 68
End: 2018-03-29

## 2018-03-29 NOTE — PROGRESS NOTES
Outcome: Left Message to establish care    Please transfer to Patient Outreach Team at [phone] when patient returns call.    WebIZ Checked & Epic Updated:  yes    HealthConnect Verified: no    Attempt # 1

## 2018-04-11 ENCOUNTER — PATIENT OUTREACH (OUTPATIENT)
Dept: HEALTH INFORMATION MANAGEMENT | Facility: OTHER | Age: 68
End: 2018-04-11

## 2018-04-11 DIAGNOSIS — Z71.89 COMPLEX CARE COORDINATION: ICD-10-CM

## 2018-04-26 NOTE — PROGRESS NOTES
Outcome: Left Message to est care    Please transfer to Patient Outreach Team at [phone] when patient returns call.      Attempt # 2

## 2018-05-03 ENCOUNTER — PATIENT OUTREACH (OUTPATIENT)
Dept: HEALTH INFORMATION MANAGEMENT | Facility: OTHER | Age: 68
End: 2018-05-03

## 2018-05-03 NOTE — PROGRESS NOTES
Outreach call to Derek.  answers phone to state that she is sleeping.    Mr. Guzman is familiar with the program as it appears he is being followed by fellow program RN David. Mr. Guzman states that he would like for his wife to be followed by David as well. He states he will ask Derek to give David a call.     I will let David know as well about our conversation.

## 2018-05-14 NOTE — PROGRESS NOTES
Attempt #:1    WebIZ Checked & Epic Updated: yes  HealthConnect Verified: yes  Verify PCP: yes    Communication Preference Obtained: yes        Care Gap Scheduling (Attempt to Schedule EACH Overdue Care Gap!)  Health Maintenance Due   Topic Date Due   • PAP SMEAR  10/03/2016   • MAMMOGRAM  06/17/2017   • IMM PNEUMOCOCCAL 65+ (ADULT) LOW/MEDIUM RISK SERIES (2 of 2 - PCV13) 12/11/2017       SCHEDULED NPT APPT/ MAMMO/ TO DISCUSS PAP WITH [facility] [place] Activation: already active  Ada[employer] [place]: no  Virtual Visits: no  Opt In to Text Messages: yes

## 2018-05-15 ENCOUNTER — HOSPITAL ENCOUNTER (OUTPATIENT)
Dept: RADIOLOGY | Facility: MEDICAL CENTER | Age: 68
End: 2018-05-15
Attending: PHYSICIAN ASSISTANT
Payer: COMMERCIAL

## 2018-05-15 DIAGNOSIS — Z12.31 SCREENING MAMMOGRAM, ENCOUNTER FOR: ICD-10-CM

## 2018-05-15 PROCEDURE — 77067 SCR MAMMO BI INCL CAD: CPT

## 2018-06-21 ENCOUNTER — TELEPHONE (OUTPATIENT)
Dept: MEDICAL GROUP | Facility: PHYSICIAN GROUP | Age: 68
End: 2018-06-21

## 2018-06-21 NOTE — TELEPHONE ENCOUNTER
Future Appointments       Provider Department Center    6/22/2018 8:45 AM Radha Ray P.A.-C. [facility] [place]    11/12/2018 9:30 AM Maite Gonsalez M.D. [facility] Dermatology, Laser and Skin Care [facility]        ESTABLISHED PATIENT PRE-VISIT PLANNING     Note: Patient will not be contacted if there is no indication to call.     1.  Reviewed notes from the last few office visits within the medical group: Yes    2.  If any orders were placed at last visit or intended to be done for this visit (i.e. 6 mos follow-up), do we have Results/Consult Notes?        •  Labs - Labs were not ordered at last office visit.       •  Imaging - Imaging ordered, completed and results are in chart.       •  Referrals - Referral ordered, patient was seen and consult notes are in chart. Care Teams updated  YES.    3. Is this appointment scheduled as a Hospital Follow-Up? No    4.  Immunizations were updated in [employer] using WebIZ?: Yes       •  Web Iz Recommendations: FLU, PREVNAR (PCV13) , TD and ZOSTAVAX (Shingles)    5.  Patient is due for the following Health Maintenance Topics:   Health Maintenance Due   Topic Date Due   • PAP SMEAR  10/03/2016   • IMM PNEUMOCOCCAL(2 of 2 - PCV13) 12/11/2017       6.  MDX printed for Provider? NO INS [facility]    7.  Patient was informed to arrive 15 min prior to their scheduled appointment and bring in their medication bottles. ELINA

## 2018-06-22 ENCOUNTER — OFFICE VISIT (OUTPATIENT)
Dept: MEDICAL GROUP | Facility: PHYSICIAN GROUP | Age: 68
End: 2018-06-22
Payer: COMMERCIAL

## 2018-06-22 VITALS
DIASTOLIC BLOOD PRESSURE: 70 MMHG | BODY MASS INDEX: 30.82 KG/M2 | WEIGHT: 185 LBS | SYSTOLIC BLOOD PRESSURE: 110 MMHG | HEIGHT: 65 IN | OXYGEN SATURATION: 96 % | TEMPERATURE: 97.9 F | HEART RATE: 64 BPM

## 2018-06-22 DIAGNOSIS — M85.89 OSTEOPENIA OF MULTIPLE SITES: ICD-10-CM

## 2018-06-22 DIAGNOSIS — E66.9 OBESITY (BMI 30-39.9): ICD-10-CM

## 2018-06-22 DIAGNOSIS — Z23 NEED FOR VACCINATION: ICD-10-CM

## 2018-06-22 DIAGNOSIS — Z86.010 HISTORY OF COLONOSCOPY WITH POLYPECTOMY: ICD-10-CM

## 2018-06-22 DIAGNOSIS — M19.042 PRIMARY OSTEOARTHRITIS OF BOTH HANDS: ICD-10-CM

## 2018-06-22 DIAGNOSIS — Z98.890 HISTORY OF COLONOSCOPY WITH POLYPECTOMY: ICD-10-CM

## 2018-06-22 DIAGNOSIS — M19.041 PRIMARY OSTEOARTHRITIS OF BOTH HANDS: ICD-10-CM

## 2018-06-22 DIAGNOSIS — J30.89 NON-SEASONAL ALLERGIC RHINITIS, UNSPECIFIED TRIGGER: ICD-10-CM

## 2018-06-22 DIAGNOSIS — M51.36 DEGENERATIVE DISC DISEASE, LUMBAR: ICD-10-CM

## 2018-06-22 DIAGNOSIS — Z85.820 HISTORY OF MELANOMA: ICD-10-CM

## 2018-06-22 DIAGNOSIS — Z00.00 BLOOD TESTS FOR ROUTINE GENERAL PHYSICAL EXAMINATION: ICD-10-CM

## 2018-06-22 PROBLEM — B35.1 ONYCHOMYCOSIS: Status: RESOLVED | Noted: 2017-07-31 | Resolved: 2018-06-22

## 2018-06-22 PROCEDURE — 90471 IMMUNIZATION ADMIN: CPT | Performed by: PHYSICIAN ASSISTANT

## 2018-06-22 PROCEDURE — 99214 OFFICE O/P EST MOD 30 MIN: CPT | Mod: 25 | Performed by: PHYSICIAN ASSISTANT

## 2018-06-22 PROCEDURE — 90670 PCV13 VACCINE IM: CPT | Performed by: PHYSICIAN ASSISTANT

## 2018-06-22 ASSESSMENT — PATIENT HEALTH QUESTIONNAIRE - PHQ9: CLINICAL INTERPRETATION OF PHQ2 SCORE: 0

## 2018-06-22 NOTE — ASSESSMENT & PLAN NOTE
Patient endorses previous polyp removal on colonoscopy. She was advised to follow up every 5 years. Colonoscopy is next due in 8/2021.

## 2018-06-22 NOTE — ASSESSMENT & PLAN NOTE
Patient states that she has arthritis in her lower back. She states that she really doesn't experience any pain, just some stiffness now and then. Her and her  are going to be leaving soon to do a lot of traveling with their travel trailer all across the country. She states that she is concerned that she may have progression of her arthritis and would like to discuss this.

## 2018-06-22 NOTE — ASSESSMENT & PLAN NOTE
Endorses chronic pain and stiffness in both of her hands. She is able to manage the pain well with over-the-counter Aleve. Her hands bother her the most when she is doing a lot of typing, which she has to do for work.

## 2018-06-22 NOTE — ASSESSMENT & PLAN NOTE
BMI 30.79. Patient states that she has been heavy since she was a young girl. She does not feel that she overeats or eats particularly unhealthy. She feels her biggest issue is lack of exercise due to a fairly sedentary job. She is hoping to improve on this when her and her  start traveling, as they both like to hike and the plan on doing a lot of hiking and outdoor activities.

## 2018-06-22 NOTE — ASSESSMENT & PLAN NOTE
Patient endorses year-round allergies. Most bothersome symptoms are itchy/watery eyes and itchy ears. She manages with over-the-counter cetirizine and Benadryl.

## 2018-06-22 NOTE — ASSESSMENT & PLAN NOTE
Noted on previous DEXA. Patient takes an over-the-counter calcium and vitamin D supplement. She did suffer a left tibia fracture last year which was treated surgically and is now fully healed.

## 2018-06-22 NOTE — PROGRESS NOTES
"Subjective:   Derek Guzman is a 67 y.o. female here today for follow-up on lumbar arthritis and other chronic conditions. Is a new patient to me and is also establishing care today.    Previous PCP: Lise Thrasher,     HPI: Patient has the following current medical problems:    History of melanoma  Diagnosed and treated \"years ago.\" Follows annually with dermatology for skin checks.    Non-seasonal allergic rhinitis  Patient endorses year-round allergies. Most bothersome symptoms are itchy/watery eyes and itchy ears. She manages with over-the-counter cetirizine and Benadryl.    History of colonoscopy with polypectomy  Patient endorses previous polyp removal on colonoscopy. She was advised to follow up every 5 years. Colonoscopy is next due in 8/2021.    Degenerative disc disease, lumbar  Patient states that she has arthritis in her lower back. She states that she really doesn't experience any pain, just some stiffness now and then. Her and her  are going to be leaving soon to do a lot of traveling with their travel trailer all across the country. She states that she is concerned that she may have progression of her arthritis and would like to discuss this.    Osteopenia of multiple sites  Noted on previous DEXA. Patient takes an over-the-counter calcium and vitamin D supplement. She did suffer a left tibia fracture last year which was treated surgically and is now fully healed.    Obesity (BMI 30-39.9)  BMI 30.79. Patient states that she has been heavy since she was a young girl. She does not feel that she overeats or eats particularly unhealthy. She feels her biggest issue is lack of exercise due to a fairly sedentary job. She is hoping to improve on this when her and her  start traveling, as they both like to hike and the plan on doing a lot of hiking and outdoor activities.    Primary osteoarthritis of both hands  Endorses chronic pain and stiffness in both of her hands. She is able to manage the " "pain well with over-the-counter Aleve. Her hands bother her the most when she is doing a lot of typing, which she has to do for work.       Current medicines (including changes today)  Current Outpatient Prescriptions   Medication Sig Dispense Refill   • Multiple Vitamin (MULTI-VITAMIN PO) Take  by mouth.     • Ascorbic Acid (VITAMIN C PO) Take  by mouth.     • Naproxen Sodium (ALEVE PO) Take  by mouth.     • NON SPECIFIED 0.5 mL by Intramuscular route Once for 1 dose. 1 Each 1   • Calcium Carbonate-Vitamin D (CALCIUM + D PO) Take 1 Tab by mouth every day.     • cetirizine (ZYRTEC) 10 MG TABS Take 10 mg by mouth 2 times a day.     • Terbinafine (LAMISIL ADVANCED) 1 % Gel 1 Application by Apply externally route 2 Times a Day. 1 Tube 3   • diphenhydrAMINE (BENADRYL) 25 MG Tab Take 25 mg by mouth at bedtime as needed for Sleep.       No current facility-administered medications for this visit.      She  has a past medical history of Allergic rhinitis (10/1/2009); Allergy; Infectious disease; Osteopenia; Pain; and Sigmoid diverticulosis (8/17/2016).    ROS  As per HPI.     Objective:     Blood pressure 110/70, pulse 64, temperature 36.6 °C (97.9 °F), height 1.651 m (5' 5\"), weight 83.9 kg (185 lb), SpO2 96 %. Body mass index is 30.79 kg/m².     Physical Exam:  Constitutional: Alert, well-appearing, no distress.  Skin: No rashes in visible areas.  Eye: Conjunctiva clear, lids normal.  ENMT: Lips without lesions, moist mucus membranes.      Assessment and Plan:   The following treatment plan was discussed    1. Degenerative disc disease, lumbar  Chronic issue. Last x-ray in 2012. I did go through these results in detail with the patient. I explained that imaging findings do not necessarily correlate with symptoms, so if she is not having any pain or significant discomfort, I do not recommend reimaging her back. I explained that the best way to prevent back issues is to lose some weight and do daily stretches and core " strengthening. She was provided with a booklet with some back exercises that she can start doing.     2. Osteopenia of multiple sites  Chronic issue, worsening per most recent DEXA. Advised patient to continue her calcium and vitamin D supplement. We discussed the role of weightbearing exercise to improve her bone strength.    3. Primary osteoarthritis of both hands  Chronic issue, stable with non-other symptoms symptoms. Continue to use over-the-counter Aleve as needed.    4. Non-seasonal allergic rhinitis, unspecified trigger  Chronic year-round allergy symptoms which are reasonably well-controlled with cetirizine and Benadryl. Continue current management.    5. History of melanoma  Past history of melanoma. She'll continue to follow with dermatology for yearly skin exams.    6. History of colonoscopy with polypectomy  Previous polyp removal. We'll have repeat colonoscopy done in 2021 as recommended.    7. Obesity (BMI 30-39.9)  - Patient identified as having weight management issue.  Appropriate orders and counseling given.    8. Need for vaccination  Due for Prevnar which she would like to have done today.  - NON SPECIFIED; 0.5 mL by Intramuscular route Once for 1 dose.  Dispense: 1 Each; Refill: 1  - PNEUMOCOCCAL CONJUGATE VACCINE 13-VALENT    9. Blood tests for routine general physical examination  Patient advised to have blood test done before next office visit.  - COMP METABOLIC PANEL; Future  - LIPID PROFILE; Future  - HEMOGLOBIN A1C; Future      Followup: Return for well-woman; Aleshia.    Radha Ray P.A.-C.

## 2018-07-02 ENCOUNTER — HOSPITAL ENCOUNTER (OUTPATIENT)
Dept: LAB | Facility: MEDICAL CENTER | Age: 68
End: 2018-07-02
Attending: PHYSICIAN ASSISTANT
Payer: COMMERCIAL

## 2018-07-02 DIAGNOSIS — Z00.00 BLOOD TESTS FOR ROUTINE GENERAL PHYSICAL EXAMINATION: ICD-10-CM

## 2018-07-02 LAB
ALBUMIN SERPL BCP-MCNC: 3.9 G/DL (ref 3.2–4.9)
ALBUMIN/GLOB SERPL: 1.4 G/DL
ALP SERPL-CCNC: 62 U/L (ref 30–99)
ALT SERPL-CCNC: 20 U/L (ref 2–50)
ANION GAP SERPL CALC-SCNC: 7 MMOL/L (ref 0–11.9)
AST SERPL-CCNC: 21 U/L (ref 12–45)
BILIRUB SERPL-MCNC: 0.4 MG/DL (ref 0.1–1.5)
BUN SERPL-MCNC: 25 MG/DL (ref 8–22)
CALCIUM SERPL-MCNC: 9.8 MG/DL (ref 8.5–10.5)
CHLORIDE SERPL-SCNC: 112 MMOL/L (ref 96–112)
CHOLEST SERPL-MCNC: 186 MG/DL (ref 100–199)
CO2 SERPL-SCNC: 25 MMOL/L (ref 20–33)
CREAT SERPL-MCNC: 0.78 MG/DL (ref 0.5–1.4)
EST. AVERAGE GLUCOSE BLD GHB EST-MCNC: 120 MG/DL
GLOBULIN SER CALC-MCNC: 2.7 G/DL (ref 1.9–3.5)
GLUCOSE SERPL-MCNC: 94 MG/DL (ref 65–99)
HBA1C MFR BLD: 5.8 % (ref 0–5.6)
HDLC SERPL-MCNC: 54 MG/DL
LDLC SERPL CALC-MCNC: 117 MG/DL
POTASSIUM SERPL-SCNC: 4.4 MMOL/L (ref 3.6–5.5)
PROT SERPL-MCNC: 6.6 G/DL (ref 6–8.2)
SODIUM SERPL-SCNC: 144 MMOL/L (ref 135–145)
TRIGL SERPL-MCNC: 76 MG/DL (ref 0–149)

## 2018-07-02 PROCEDURE — 80053 COMPREHEN METABOLIC PANEL: CPT | Mod: GY

## 2018-07-02 PROCEDURE — 36415 COLL VENOUS BLD VENIPUNCTURE: CPT | Mod: GY

## 2018-07-02 PROCEDURE — 80061 LIPID PANEL: CPT | Mod: GY

## 2018-07-02 PROCEDURE — 83036 HEMOGLOBIN GLYCOSYLATED A1C: CPT | Mod: GY

## 2018-07-20 ENCOUNTER — TELEPHONE (OUTPATIENT)
Dept: MEDICAL GROUP | Facility: PHYSICIAN GROUP | Age: 68
End: 2018-07-20

## 2018-07-20 NOTE — TELEPHONE ENCOUNTER
Future Appointments       Provider Department [place]    7/23/2018 8:25 AM Radha Ray P.A.-C. [facility] [place]    11/12/2018 9:30 AM Maite Gonsalez M.D. [facility] Dermatology, Laser and Skin Care [facility]        ESTABLISHED PATIENT PRE-VISIT PLANNING     Note: Patient will not be contacted if there is no indication to call.     1.  Reviewed notes from the last few office visits within the medical group: Yes    2.  If any orders were placed at last visit or intended to be done for this visit (i.e. 6 mos follow-up), do we have Results/Consult Notes?        •  Labs - Labs ordered, completed on 07/02/18 and results are in chart.       •  Imaging - Imaging was not ordered at last office visit.       •  Referrals - No referrals were ordered at last office visit.    3. Is this appointment scheduled as a Hospital Follow-Up? No    4.  Immunizations were updated in Epic using WebIZ?: Yes       •  Web Iz Recommendations: FLU, TD and ZOSTAVAX (Shingles)    5.  Patient is due for the following Health Maintenance Topics:   Health Maintenance Due   Topic Date Due   • PAP SMEAR  10/03/2016       6.  MDX printed for Provider? NO INS [facility]     7.  Patient was informed to arrive 15 min prior to their scheduled appointment and bring in their medication bottles. ELINA

## 2018-07-23 ENCOUNTER — OFFICE VISIT (OUTPATIENT)
Dept: MEDICAL GROUP | Facility: PHYSICIAN GROUP | Age: 68
End: 2018-07-23
Payer: COMMERCIAL

## 2018-07-23 ENCOUNTER — HOSPITAL ENCOUNTER (OUTPATIENT)
Facility: MEDICAL CENTER | Age: 68
End: 2018-07-23
Attending: PHYSICIAN ASSISTANT
Payer: COMMERCIAL

## 2018-07-23 VITALS
BODY MASS INDEX: 30.82 KG/M2 | DIASTOLIC BLOOD PRESSURE: 62 MMHG | OXYGEN SATURATION: 96 % | HEART RATE: 66 BPM | SYSTOLIC BLOOD PRESSURE: 100 MMHG | WEIGHT: 185 LBS | HEIGHT: 65 IN | TEMPERATURE: 97.8 F

## 2018-07-23 DIAGNOSIS — R73.03 PREDIABETES: ICD-10-CM

## 2018-07-23 DIAGNOSIS — Z01.419 ENCOUNTER FOR ROUTINE GYNECOLOGICAL EXAMINATION WITH PAPANICOLAOU SMEAR OF CERVIX: ICD-10-CM

## 2018-07-23 DIAGNOSIS — E78.00 ELEVATED LDL CHOLESTEROL LEVEL: ICD-10-CM

## 2018-07-23 PROCEDURE — 87624 HPV HI-RISK TYP POOLED RSLT: CPT

## 2018-07-23 PROCEDURE — 88175 CYTOPATH C/V AUTO FLUID REDO: CPT

## 2018-07-23 PROCEDURE — 99397 PER PM REEVAL EST PAT 65+ YR: CPT | Performed by: PHYSICIAN ASSISTANT

## 2018-07-26 LAB
CYTOLOGY REG CYTOL: NORMAL
HPV HR 12 DNA CVX QL NAA+PROBE: NEGATIVE
HPV16 DNA SPEC QL NAA+PROBE: NEGATIVE
HPV18 DNA SPEC QL NAA+PROBE: NEGATIVE
SPECIMEN SOURCE: NORMAL

## 2018-08-20 ENCOUNTER — HOSPITAL ENCOUNTER (EMERGENCY)
Facility: MEDICAL CENTER | Age: 68
End: 2018-08-20
Attending: EMERGENCY MEDICINE
Payer: COMMERCIAL

## 2018-08-20 VITALS
OXYGEN SATURATION: 99 % | HEART RATE: 75 BPM | HEIGHT: 65 IN | DIASTOLIC BLOOD PRESSURE: 86 MMHG | WEIGHT: 184.53 LBS | SYSTOLIC BLOOD PRESSURE: 122 MMHG | RESPIRATION RATE: 16 BRPM | TEMPERATURE: 97.5 F | BODY MASS INDEX: 30.74 KG/M2

## 2018-08-20 DIAGNOSIS — M25.512 ACUTE PAIN OF LEFT SHOULDER: ICD-10-CM

## 2018-08-20 PROCEDURE — 96372 THER/PROPH/DIAG INJ SC/IM: CPT

## 2018-08-20 PROCEDURE — 99284 EMERGENCY DEPT VISIT MOD MDM: CPT

## 2018-08-20 PROCEDURE — 700111 HCHG RX REV CODE 636 W/ 250 OVERRIDE (IP): Performed by: EMERGENCY MEDICINE

## 2018-08-20 PROCEDURE — 93971 EXTREMITY STUDY: CPT

## 2018-08-20 RX ORDER — PREDNISONE 20 MG/1
40 TABLET ORAL DAILY
Qty: 10 TAB | Refills: 0 | Status: SHIPPED | OUTPATIENT
Start: 2018-08-20 | End: 2018-08-25

## 2018-08-20 RX ORDER — KETOROLAC TROMETHAMINE 30 MG/ML
15 INJECTION, SOLUTION INTRAMUSCULAR; INTRAVENOUS ONCE
Status: COMPLETED | OUTPATIENT
Start: 2018-08-20 | End: 2018-08-20

## 2018-08-20 RX ADMIN — KETOROLAC TROMETHAMINE 15 MG: 30 INJECTION, SOLUTION INTRAMUSCULAR at 06:36

## 2018-08-20 ASSESSMENT — ENCOUNTER SYMPTOMS
HEADACHES: 0
COUGH: 0
NAUSEA: 0
CHILLS: 0
VOMITING: 0
FEVER: 0

## 2018-08-20 ASSESSMENT — PAIN SCALES - WONG BAKER: WONGBAKER_NUMERICALRESPONSE: HURTS A LITTLE MORE

## 2018-08-20 ASSESSMENT — PAIN SCALES - GENERAL
PAINLEVEL_OUTOF10: 2
PAINLEVEL_OUTOF10: 2
PAINLEVEL_OUTOF10: 4

## 2018-08-20 NOTE — ED NOTES
D/c instructions given and all questions answered. Prescriptions given X 1. Pt to follow up with PCP. Pt verbalized understanding. Pt belongings with patient. Pt ambulatory to lobby.  VSS. NAD.

## 2018-08-20 NOTE — DISCHARGE INSTRUCTIONS
Rotator Cuff Injury  Rotator cuff injury is any type of injury to the set of muscles and tendons that make up the stabilizing unit of your shoulder. This unit holds the ball of your upper arm bone (humerus) in the socket of your shoulder blade (scapula).  What are the causes?  Injuries to your rotator cuff most commonly come from sports or activities that cause your arm to be moved repeatedly over your head. Examples of this include throwing, weight lifting, swimming, or racquet sports. Long lasting (chronic) irritation of your rotator cuff can cause soreness and swelling (inflammation), bursitis, and eventual damage to your tendons, such as a tear (rupture).  What are the signs or symptoms?  Acute rotator cuff tear:  · Sudden tearing sensation followed by severe pain shooting from your upper shoulder down your arm toward your elbow.  · Decreased range of motion of your shoulder because of pain and muscle spasm.  · Severe pain.  · Inability to raise your arm out to the side because of pain and loss of muscle power (large tears).  Chronic rotator cuff tear:  · Pain that usually is worse at night and may interfere with sleep.  · Gradual weakness and decreased shoulder motion as the pain worsens.  · Decreased range of motion.  Rotator cuff tendinitis:  · Deep ache in your shoulder and the outside upper arm over your shoulder.  · Pain that comes on gradually and becomes worse when lifting your arm to the side or turning it inward.  How is this diagnosed?  Rotator cuff injury is diagnosed through a medical history, physical exam, and imaging exam. The medical history helps determine the type of rotator cuff injury. Your health care provider will look at your injured shoulder, feel the injured area, and ask you to move your shoulder in different positions. X-ray exams typically are done to rule out other causes of shoulder pain, such as fractures. MRI is the exam of choice for the most severe shoulder injuries because the  images show muscles and tendons.  How is this treated?  Chronic tear:  · Medicine for pain, such as acetaminophen or ibuprofen.  · Physical therapy and range-of-motion exercises may be helpful in maintaining shoulder function and strength.  · Steroid injections into your shoulder joint.  · Surgical repair of the rotator cuff if the injury does not heal with noninvasive treatment.  Acute tear:  · Anti-inflammatory medicines such as ibuprofen and naproxen to help reduce pain and swelling.  · A sling to help support your arm and rest your rotator cuff muscles. Long-term use of a sling is not advised. It may cause significant stiffening of the shoulder joint.  · Surgery may be considered within a few weeks, especially in younger, active people, to return the shoulder to full function.  · Indications for surgical treatment include the following:  ¨ Age younger than 60 years.  ¨ Rotator cuff tears that are complete.  ¨ Physical therapy, rest, and anti-inflammatory medicines have been used for 6-8 weeks, with no improvement.  ¨ Employment or sporting activity that requires constant shoulder use.  Tendinitis:  · Anti-inflammatory medicines such as ibuprofen and naproxen to help reduce pain and swelling.  · A sling to help support your arm and rest your rotator cuff muscles. Long-term use of a sling is not advised. It may cause significant stiffening of the shoulder joint.  · Severe tendinitis may require:  ¨ Steroid injections into your shoulder joint.  ¨ Physical therapy.  ¨ Surgery.  Follow these instructions at home:  · Apply ice to your injury:  ¨ Put ice in a plastic bag.  ¨ Place a towel between your skin and the bag.  ¨ Leave the ice on for 20 minutes, 2-3 times a day.  · If you have a shoulder immobilizer (sling and straps), wear it until told otherwise by your health care provider.  · You may want to sleep on several pillows or in a recliner at night to lessen swelling and pain.  · Only take over-the-counter or  prescription medicines for pain, discomfort, or fever as directed by your health care provider.  · Do simple hand squeezing exercises with a soft rubber ball to decrease hand swelling.  Contact a health care provider if:  · Your shoulder pain increases, or new pain or numbness develops in your arm, hand, or fingers.  · Your hand or fingers are colder than your other hand.  Get help right away if:  · Your arm, hand, or fingers are numb or tingling.  · Your arm, hand, or fingers are increasingly swollen and painful, or they turn white or blue.  This information is not intended to replace advice given to you by your health care provider. Make sure you discuss any questions you have with your health care provider.  Document Released: 12/15/2001 Document Revised: 05/25/2017 Document Reviewed: 07/30/2014  [employer] Interactive Patient Education © 2017 [employer]

## 2018-08-20 NOTE — ED NOTES
US tech at bedside for exam. NAD. VSS. AAOx4. Respirations even and unlabored. Skin is warm and dry to touch. Side rails raised for safety. Call light within reach. Will continue to monitor.

## 2018-08-20 NOTE — ED PROVIDER NOTES
"ED Provider Note    ED Provider Note    Primary care provider: Radha Ray P.A.-C.  Means of arrival: POV  History obtained from: Patient  History limited by: None    CHIEF COMPLAINT  Chief Complaint   Patient presents with   • Arm Pain     Pt reports 4/10 \"constant\" pain to left upper arm. Pt states, \"I have a small tear in my rotator cuff. When I went forward pain shot down my arm.\"       HPI  Derek Guzman is a 67 y.o. female who presents to the Emergency Department her  with a chief complaint of left arm pain.  Patient has history of bilateral shoulder pain.  She has undergone injections in the past.  She has had her right side repaired after rotator cuff injury.  She states she is having similar symptoms that she presented with on the contralateral side.  Yesterday, she was sitting up, simply reached for her tablet and had a sudden shock of pain.  This pain has persisted.  She has had limited use of her left upper extremity has caused her pain for some time.  She is otherwise been in her normal state of health.  No fevers cough congestion.  No chest pain or shortness of breath.  No abdominal pain.  She denies any skin changes to the area.  Any kind of movement of the left shoulder increases the pain.  She has been told, in the past that she will likely need operative intervention but at the time this was discussed, she was not prepared to undergo surgery.    REVIEW OF SYSTEMS  Review of Systems   Constitutional: Negative for chills and fever.   HENT: Negative for congestion.    Respiratory: Negative for cough.    Cardiovascular: Negative for chest pain.   Gastrointestinal: Negative for nausea and vomiting.   Musculoskeletal: Positive for joint pain.   Skin: Negative for rash.   Neurological: Negative for headaches.   All other systems reviewed and are negative.      PAST MEDICAL HISTORY   has a past medical history of Allergic rhinitis (10/1/2009); Allergy; Infectious disease; Osteopenia; " "Pain; and Sigmoid diverticulosis (2016).    SURGICAL HISTORY   has a past surgical history that includes  delivery only (); shoulder arthroscopy (2009); rotator cuff repair (2009); toe fusion (2011); bone graft (2011); and tibia orif (Left, 2017).    SOCIAL HISTORY  Social History   Substance Use Topics   • Smoking status: Former Smoker     Packs/day: 0.50     Years: 7.00     Types: Cigarettes     Quit date: 1978   • Smokeless tobacco: Never Used      Comment: avoid all tobacco products   • Alcohol use No      History   Drug Use No       FAMILY HISTORY  Family History   Problem Relation Age of Onset   • Cancer Mother         colon cancer twice, lung cancer   • Diabetes Mother    • Heart Disease Father         mi   • Diabetes Father    • Diabetes Unknown    • Heart Disease Unknown    • Cancer Unknown        CURRENT MEDICATIONS  Home Medications    **Home medications have not yet been reviewed for this encounter**         ALLERGIES  Allergies   Allergen Reactions   • Tape Rash     blisters       PHYSICAL EXAM  VITAL SIGNS: /82   Pulse 73   Temp 36.5 °C (97.7 °F)   Resp 16   Ht 1.638 m (5' 4.5\")   Wt 83.7 kg (184 lb 8.4 oz)   SpO2 98%   BMI 31.18 kg/m²   Vitals reviewed.  Constitutional: Patient is oriented to person, place, and time. Appears well-developed and well-nourished. Mild distress.    Head: Normocephalic and atraumatic.   Ears: Normal external ears bilaterally.   Eyes: Conjunctivae are normal. Pupils are equal, round, and reactive to light.   Neck: Normal range of motion. Neck supple.  Cardiovascular: Normal rate, regular rhythm and normal heart sounds. Normal peripheral pulses, LUE.  Pulmonary/Chest: Effort normal and breath sounds normal. No respiratory distress, no wheezes, rhonchi, or rales.  Abdominal: Soft. Bowel sounds are normal. There is no tenderness. No rebound or guarding, or peritoneal signs.   Musculoskeletal: No edema.  Limited range of " motion of left shoulder secondary to pain.  No overlying skin changes.  No swelling.    Neurological: No focal deficits.   Skin: Skin is warm and dry. No erythema. No pallor.   Psychiatric: Patient has a normal mood and affect.     RADIOLOGY  UE VENOUS DUPLEX (Specify in Comments Left, Right Or Bilateral)           The radiologist's interpretation of all radiological studies have been reviewed by me.    COURSE & MEDICAL DECISION MAKING  Pertinent Labs & Imaging studies reviewed. (See chart for details)    Obtained and reviewed past medical records.  Patient's last encounter was an outpatient visit she was seen in July of this year for well woman exam.  Prior to that, patient seen in June of this year follow-up on her lumbar arthritis and chronic conditions including allergic rhinitis, degenerative disc disease, osteopenia, obesity.  Last ED visit in January 2017 she was seen after a fall.  She sustained a lower extremity fracture and was admitted for operative intervention.    5:39 AM - Patient seen and examined at bedside.  Patient presents with left arm pain pain at the left shoulder joint.  It does not appear, that this is an anginal equivalent.  I do not suspect ACS.  It is in keeping with symptoms she has had in the past although more severe at this time.  I suspect, highly, that this is musculoskeletal.  However, she has has decreased use of it raising concern for possible DVT.  She denies pain medications but is agreeable to NSAIDs.      8:22 AM, patient reevaluated at the bedside.  We discussed ultrasound findings which show no evidence of DVT.  She does report improvement after Toradol.  I do think she could also benefit from a short course of steroids.  She has a sling at home and declines one of hours.  She will follow-up with Dr. Weinberg at the [facility].  She can continue to get steroid injections there and can rediscuss with him, operative intervention for repair of rotator cuff injury.  At  this time, patient can be safely discharged home.  She is given strict return precautions.      Discharged home in stable condition.      FINAL IMPRESSION  1. Acute pain of left shoulder    Acute on chronic.  History of rotator cuff injury

## 2018-08-20 NOTE — ED TRIAGE NOTES
"Derek Cecile Guzman  67 y.o. female  Chief Complaint   Patient presents with   • Arm Pain     Pt reports 4/10 \"constant\" pain to left upper arm. Pt states, \"I have a small tear in my rotator cuff. When I went forward pain shot down my arm.\"       Pt amb to triage with steady gait for above complaint. CMS intact to LUE. Denies recent trauma or injury to area.  Pt is alert and oriented, speaking in full sentences, follows commands and responds appropriately to questions. NAD. Resp are even and unlabored.  Pt placed in lobby. Pt educated on triage process. Pt encouraged to alert staff for any changes.    "

## 2018-08-30 ENCOUNTER — TELEPHONE (OUTPATIENT)
Dept: MEDICAL GROUP | Facility: PHYSICIAN GROUP | Age: 68
End: 2018-08-30

## 2018-08-30 NOTE — TELEPHONE ENCOUNTER
Future Appointments       Provider Department [place]    8/31/2018 8:25 AM Radha Ray P.A.-C. [facility]    11/12/2018 9:30 AM Maite Gonsalez M.D. [facility]        ESTABLISHED PATIENT PRE-VISIT PLANNING     Note: Patient will not be contacted if there is no indication to call.     1.  Reviewed notes from the last few office visits within the medical group: Yes    2.  If any orders were placed at last visit or intended to be done for this visit (i.e. 6 mos follow-up), do we have Results/Consult Notes?        •  Labs - Labs were not ordered at last office visit.       •  Imaging - Imaging ordered, completed and results are in chart.       •  Referrals - No referrals were ordered at last office visit.    3. Is this appointment scheduled as a Hospital Follow-Up? No    4.  Immunizations were updated in Tagged using WebIZ?: Yes       •  Web Iz Recommendations: FLU, TD and ZOSTAVAX (Shingles)    5.  Patient is due for the following Health Maintenance Topics:   Health Maintenance Due   Topic Date Due   • IMM ZOSTER VACCINES (2 of 3) 07/15/2016       6.  MDX printed for Provider? NO INS [facility]     7.  Patient was informed to arrive 15 min prior to their scheduled appointment and bring in their medication bottles. Confirmed through automated call

## 2018-08-31 ENCOUNTER — OFFICE VISIT (OUTPATIENT)
Dept: MEDICAL GROUP | Facility: PHYSICIAN GROUP | Age: 68
End: 2018-08-31
Payer: COMMERCIAL

## 2018-08-31 VITALS
HEART RATE: 60 BPM | WEIGHT: 185 LBS | TEMPERATURE: 98.5 F | OXYGEN SATURATION: 96 % | SYSTOLIC BLOOD PRESSURE: 102 MMHG | HEIGHT: 65 IN | DIASTOLIC BLOOD PRESSURE: 62 MMHG | BODY MASS INDEX: 30.82 KG/M2

## 2018-08-31 DIAGNOSIS — J30.89 NON-SEASONAL ALLERGIC RHINITIS, UNSPECIFIED TRIGGER: ICD-10-CM

## 2018-08-31 DIAGNOSIS — R59.0 SUBMANDIBULAR LYMPHADENOPATHY: ICD-10-CM

## 2018-08-31 PROCEDURE — 99214 OFFICE O/P EST MOD 30 MIN: CPT | Performed by: PHYSICIAN ASSISTANT

## 2018-08-31 RX ORDER — AZELASTINE 1 MG/ML
1 SPRAY, METERED NASAL 2 TIMES DAILY
Qty: 30 ML | Refills: 3 | Status: SHIPPED | OUTPATIENT
Start: 2018-08-31 | End: 2023-05-23
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
## 2018-10-15 ENCOUNTER — OFFICE VISIT (OUTPATIENT)
Dept: URGENT CARE | Facility: PHYSICIAN GROUP | Age: 68
End: 2018-10-15
Payer: COMMERCIAL

## 2018-10-15 ENCOUNTER — APPOINTMENT (OUTPATIENT)
Dept: RADIOLOGY | Facility: IMAGING CENTER | Age: 68
End: 2018-10-15
Attending: PHYSICIAN ASSISTANT
Payer: COMMERCIAL

## 2018-10-15 VITALS
HEART RATE: 64 BPM | HEIGHT: 65 IN | OXYGEN SATURATION: 96 % | TEMPERATURE: 97.9 F | BODY MASS INDEX: 29.82 KG/M2 | DIASTOLIC BLOOD PRESSURE: 76 MMHG | SYSTOLIC BLOOD PRESSURE: 118 MMHG | WEIGHT: 179 LBS | RESPIRATION RATE: 18 BRPM

## 2018-10-15 DIAGNOSIS — M25.512 ACUTE PAIN OF LEFT SHOULDER: ICD-10-CM

## 2018-10-15 DIAGNOSIS — S46.912A STRAIN OF LEFT SHOULDER, INITIAL ENCOUNTER: ICD-10-CM

## 2018-10-15 PROCEDURE — 99214 OFFICE O/P EST MOD 30 MIN: CPT | Performed by: PHYSICIAN ASSISTANT

## 2018-10-15 PROCEDURE — 73030 X-RAY EXAM OF SHOULDER: CPT | Mod: 26,LT | Performed by: PHYSICIAN ASSISTANT

## 2018-10-15 ASSESSMENT — ENCOUNTER SYMPTOMS
NUMBNESS: 0
WEAKNESS: 0
CARDIOVASCULAR NEGATIVE: 1
FEVER: 0
SPEECH CHANGE: 0
NAUSEA: 0
VOMITING: 0
ABDOMINAL PAIN: 0
RESPIRATORY NEGATIVE: 1
CHILLS: 0
CHANGE IN BOWEL HABIT: 0
SENSORY CHANGE: 0
NECK PAIN: 0
GASTROINTESTINAL NEGATIVE: 1
EYES NEGATIVE: 1
TINGLING: 0

## 2018-10-15 NOTE — PROGRESS NOTES
Subjective:      Derek Guzman is a 67 y.o. female who presents with Arm Pain (Lt arm, torn rotator cuff, )            Shoulder Pain   This is a new problem. The current episode started in the past 7 days. The problem occurs constantly. The problem has been gradually worsening. Pertinent negatives include no abdominal pain, change in bowel habit, chills, fever, nausea, neck pain, numbness, vomiting or weakness. Exacerbated by: movement. She has tried NSAIDs for the symptoms. The treatment provided mild relief.   Old left shoulder injury. She did reinjure it this weekend.       PMH:  has a past medical history of Allergic rhinitis (10/1/2009); Allergy; Infectious disease; Osteopenia; Pain; and Sigmoid diverticulosis (8/17/2016).  MEDS:   Current Outpatient Prescriptions:   •  azelastine (ASTELIN) 137 MCG/SPRAY nasal spray, Spray 1 Spray in nose 2 times a day., Disp: 30 mL, Rfl: 3  •  Multiple Vitamin (MULTI-VITAMIN PO), Take  by mouth., Disp: , Rfl:   •  Ascorbic Acid (VITAMIN C PO), Take  by mouth., Disp: , Rfl:   •  Calcium Carbonate-Vitamin D (CALCIUM + D PO), Take 1 Tab by mouth every day., Disp: , Rfl:   •  cetirizine (ZYRTEC) 10 MG TABS, Take 10 mg by mouth 2 times a day., Disp: , Rfl:   •  Naproxen Sodium (ALEVE PO), Take  by mouth., Disp: , Rfl:   •  Terbinafine (LAMISIL ADVANCED) 1 % Gel, 1 Application by Apply externally route 2 Times a Day., Disp: 1 Tube, Rfl: 3  •  diphenhydrAMINE (BENADRYL) 25 MG Tab, Take 25 mg by mouth at bedtime as needed for Sleep., Disp: , Rfl:   ALLERGIES:   Allergies   Allergen Reactions   • Tape Rash     blisters     SURGHX:   Past Surgical History:   Procedure Laterality Date   • TIBIA ORIF Left 1/20/2017    Procedure: TIBIA ORIF;  Surgeon: Casey Jewell M.D.;  Location: SURGERY [facility];  Service:    • TOE FUSION  6/20/2011    Performed by BARTOLOME ROBLES at [facility]   • BONE GRAFT  6/20/2011    Performed by BARTOLOME ROBLES at [facility]  "[facility]S   • SHOULDER ARTHROSCOPY  2009    right; Performed by RICHARD ELIZONDO at SURGERY SAME DAY [facility] ORS   • ROTATOR CUFF REPAIR  2009    Performed by RICHARD ELIZONDO at SURGERY SAME DAY [facility] ORS   • PB  DELIVERY ONLY       SOCHX:  reports that she quit smoking about 40 years ago. Her smoking use included Cigarettes. She has a 3.50 pack-year smoking history. She has never used smokeless tobacco. She reports that she does not drink alcohol or use drugs.  FH: family history includes Cancer in her mother and unknown relative; Diabetes in her father, mother, and unknown relative; Heart Disease in her father and unknown relative.      Review of Systems   Constitutional: Negative for chills and fever.   HENT: Negative.    Eyes: Negative.    Respiratory: Negative.    Cardiovascular: Negative.    Gastrointestinal: Negative.  Negative for abdominal pain, change in bowel habit, nausea and vomiting.   Musculoskeletal: Positive for joint pain. Negative for neck pain.   Neurological: Negative for tingling, sensory change, speech change, weakness and numbness.       Medications, Allergies, and current problem list reviewed today in Epic     Objective:     /76   Pulse 64   Temp 36.6 °C (97.9 °F)   Resp 18   Ht 1.651 m (5' 5\")   Wt 81.2 kg (179 lb)   SpO2 96%   BMI 29.79 kg/m²      Physical Exam   Constitutional: She is oriented to person, place, and time. She appears well-developed and well-nourished. No distress.   HENT:   Head: Normocephalic and atraumatic.   Eyes: Conjunctivae and EOM are normal.   Neck: Normal range of motion. Neck supple.   Cardiovascular: Normal rate, regular rhythm and normal heart sounds.    Pulmonary/Chest: Effort normal and breath sounds normal. No respiratory distress. She has no wheezes.   Musculoskeletal:        Left shoulder: She exhibits decreased range of motion, tenderness and pain. She exhibits no bony tenderness, no swelling, no effusion, no " crepitus, no deformity, normal pulse and normal strength.        Arms:  Reproducible anterior left shoulder pain.   Neurological: She is alert and oriented to person, place, and time.   Skin: Skin is warm and dry. She is not diaphoretic.   Psychiatric: She has a normal mood and affect. Her behavior is normal. Judgment and thought content normal.   Nursing note and vitals reviewed.              Assessment/Plan:      1. Acute pain of left shoulder  DX-SHOULDER 2+ LEFT   2. Strain of left shoulder, initial encounter  REFERRAL TO SPORTS MEDICINE     Xray: no fracture or dislocation by my read. Radiology review pending.    X-ray negative. He presents for anterior left shoulder pain. Motion limited in all planes. Old injury which she has reaggravated this weekend. She denies any chest pain, palpitations. Vital signs normal. No known heart history. She'll be referred to sports medicine. She is placed in a shoulder sling  OTC meds and conservative measures as discussed  Return to clinic or go to ED if symptoms worsen or persist. Indications for ED discussed at length. Patient voices understanding. Follow-up with your primary care provider in 3-5 days. Red flags discussed. All side effects of medication discussed including allergic response, GI upset, tendon injury, etc.    Please note that this dictation was created using voice recognition software. I have made every reasonable attempt to correct obvious errors, but I expect that there are errors of grammar and possibly content that I did not discover before finalizing the note.

## 2018-10-18 ENCOUNTER — TELEPHONE (OUTPATIENT)
Dept: MEDICAL GROUP | Facility: CLINIC | Age: 68
End: 2018-10-18

## 2018-10-18 NOTE — TELEPHONE ENCOUNTER
I received a message that both her and her  needed to schedule an appointment with sports medicine. I have tried twice to call and left messages and just keep getting voicemail. I left my direct phone number for them to call.

## 2018-10-21 ENCOUNTER — HOSPITAL ENCOUNTER (EMERGENCY)
Facility: MEDICAL CENTER | Age: 68
End: 2018-10-21
Attending: EMERGENCY MEDICINE
Payer: COMMERCIAL

## 2018-10-21 ENCOUNTER — APPOINTMENT (OUTPATIENT)
Dept: RADIOLOGY | Facility: MEDICAL CENTER | Age: 68
End: 2018-10-21
Attending: EMERGENCY MEDICINE
Payer: COMMERCIAL

## 2018-10-21 VITALS
HEIGHT: 64 IN | HEART RATE: 77 BPM | TEMPERATURE: 97.6 F | OXYGEN SATURATION: 98 % | BODY MASS INDEX: 31.01 KG/M2 | DIASTOLIC BLOOD PRESSURE: 78 MMHG | WEIGHT: 181.66 LBS | SYSTOLIC BLOOD PRESSURE: 130 MMHG | RESPIRATION RATE: 17 BRPM

## 2018-10-21 DIAGNOSIS — S40.022A CONTUSION OF LEFT UPPER ARM, INITIAL ENCOUNTER: ICD-10-CM

## 2018-10-21 PROCEDURE — 99284 EMERGENCY DEPT VISIT MOD MDM: CPT

## 2018-10-21 PROCEDURE — 73080 X-RAY EXAM OF ELBOW: CPT | Mod: LT

## 2018-10-21 ASSESSMENT — PAIN SCALES - GENERAL: PAINLEVEL_OUTOF10: 7

## 2018-10-21 NOTE — ED NOTES
Chief Complaint   Patient presents with   • Arm Pain       Pt presents to ed with symptoms stated in triage note.  Pt also reports tinging to distal LUE.  Site is warm to touch.  Muscle  5+.    Pt on gurney, NAD, VSS.   ERP to see.

## 2018-10-21 NOTE — ED NOTES
Pt given discharge instructions.  Signed copy in chart. Pt states all belongings in possession. Pt ambulatory off unit.

## 2018-10-21 NOTE — ED TRIAGE NOTES
"Chief Complaint   Patient presents with   • Arm Pain     Pt reports that she was reaching across the table developed some shoulder pain a few days ago. States that this morning she began having bruising to her left upper arm. Since this morning the bruising has doubled in size and she is starting to experience tingling in her fingers.   Blood pressure 139/77, pulse 76, temperature 36.4 °C (97.6 °F), resp. rate 17, height 1.626 m (5' 4\"), weight 82.4 kg (181 lb 10.5 oz), SpO2 98 %, not currently breastfeeding.    Pt informed of wait times. Educated on triage process.  Asked to return to triage RN for any new or worsening of symptoms. Thanked for patience.        "

## 2018-10-21 NOTE — ED PROVIDER NOTES
ED Provider Note    Scribed for Reba Stern M.D. by Zeyad Hall. 10/21/2018  3:33 PM    Primary care provider: Radha Ray P.A.-C.  Means of arrival: Walk-in  History obtained from: Patient  History limited by: None    CHIEF COMPLAINT  Chief Complaint   Patient presents with   • Arm Pain       HPI  Derek Guzman is a 67 y.o. female who presents to the Emergency Department with left arm pain onset one week ago. The patient reports she went to Urgent Care on 10/15 with the same chief complaint. An x-ray was ordered and there with no significant findings. Today she returns to the ED secondary with associated bruising to the area. Intermittent numbness and tingling to the area is additionally reported. No alleviating or exacerbating factors were noted. The patient notes she has a tear in the rotator cuff which she has been receiving treatment from Dr. Weinberg at the [facility]. Derek does not take any blood thinners at baseline. She denies fever, chest pain, shortness of breath, and loss of sensation.      REVIEW OF SYSTEMS    Neuro: no weakness, aphasia, or headache. Positive for numbness and tingling.  Musculoskeletal: no swelling, deformity, pain, or joint swelling. Positive for left arm pain.  Endocrine: no fevers, sweating, or weight loss   SKIN: no rash, erythema. Positive for contusions     See history of present illness.     PAST MEDICAL HISTORY   has a past medical history of Allergic rhinitis (10/1/2009); Allergy; Infectious disease; Osteopenia; Pain; and Sigmoid diverticulosis (2016).    SURGICAL HISTORY   has a past surgical history that includes  delivery only (); shoulder arthroscopy (2009); rotator cuff repair (2009); toe fusion (2011); bone graft (2011); and tibia orif (Left, 2017).    SOCIAL HISTORY  Social History   Substance Use Topics   • Smoking status: Former Smoker     Packs/day: 0.50     Years: 7.00     Types: Cigarettes     Quit date: 1978  "  • Smokeless tobacco: Never Used      Comment: avoid all tobacco products   • Alcohol use No      History   Drug Use No       FAMILY HISTORY  Family History   Problem Relation Age of Onset   • Cancer Mother         colon cancer twice, lung cancer   • Diabetes Mother    • Heart Disease Father         mi   • Diabetes Father    • Diabetes Unknown    • Heart Disease Unknown    • Cancer Unknown        CURRENT MEDICATIONS  Home Medications     Reviewed by Gin Vazquez R.N. (Registered Nurse) on 10/21/18 at 1520  Med List Status: Partial   Medication Last Dose Status   Ascorbic Acid (VITAMIN C PO) Taking Active   azelastine (ASTELIN) 137 MCG/SPRAY nasal spray Taking Active   Calcium Carbonate-Vitamin D (CALCIUM + D PO) Taking Active   cetirizine (ZYRTEC) 10 MG TABS Taking Active   diphenhydrAMINE (BENADRYL) 25 MG Tab PRN Active   Multiple Vitamin (MULTI-VITAMIN PO) Taking Active   Naproxen Sodium (ALEVE PO) PRN Active   Terbinafine (LAMISIL ADVANCED) 1 % Gel > Month Active                ALLERGIES  Allergies   Allergen Reactions   • Tape Rash     blisters       PHYSICAL EXAM  VITAL SIGNS: BP (P) 130/78   Pulse (P) 77   Temp 36.4 °C (97.6 °F)   Resp 17   Ht 1.626 m (5' 4\")   Wt 82.4 kg (181 lb 10.5 oz)   SpO2 (P) 98%   BMI 31.18 kg/m²     Constitutional: Well developed, Well nourished, No acute distress, Non-toxic appearance.    Skin: Warm, Dry, No erythema, No rash. Contusion to left upper arm near the lateral bicep area.  Neurologic: Alert & oriented x 3, Normal motor function, Normal sensory function, No focal deficits noted. Normal reflexes. Normal Cranial Nerves. Neurovascularly intact with normal strength and sensation.   Extremities: Equal, intact distal pulses, No cyanosis, clubbing or edema,  No tenderness.   Musculoskeletal: Good range of motion in all major joints. No tenderness to palpation or major deformities noted. Full ROM of left bicep. Cannot elicit any pain of the left arm. No bony " tenderness or step offs.     DIAGNOSTIC STUDIES / PROCEDURES    RADIOLOGY  DX-ELBOW-COMPLETE 3+ LEFT   Final Result      No evidence of acute fracture or dislocation.        The radiologist's interpretation of all radiological studies have been reviewed by me.    COURSE & MEDICAL DECISION MAKING  Nursing notes, TYE HOGANHx reviewed in chart.    3:33 PM Patient seen and examined at bedside. Ordered Dx-Elbow Complete 3+ Left to evaluate her symptoms. The differential diagnoses include but are not limited to: Tendonitis, Muscle Tear, Contusion. I will order an X-ray to rule out any bony abnormalities. The patient will follow up with Dr. Weinberg after discharge.    4:22 PM - Recheck: Patient is resting comfortably and reports feeling improved. I updated her on her results, which did not indicate any evidence of an acute fracture or dislocation. I explained that she is now stable for discharge. I advised her to follow up with Dr. Weinberg and to return to the ED for new or worsening symptoms. She understands and will comply.       Patient has had high blood pressure while in the emergency department, felt likely secondary to medical condition. Counseled patient to monitor blood pressure at home and follow up with primary care physician.    DISPOSITION:  Patient will be discharged home in stable condition.    FOLLOW UP:  Rich Weinberg M.D.  [address] [facility] [address]  [phone]    Call in 1 day  to establish care, for recheck      OUTPATIENT MEDICATIONS:  Discharge Medication List as of 10/21/2018  4:27 PM          FINAL IMPRESSION  1. Contusion of left upper arm, initial encounter          Zeyad LEAHY (Laurie), am scribing for, and in the presence of, Reba Stern M.D..    Electronically signed by: Zeyad Hall (Laurie), 10/21/2018    Reba LEAHY M.D. personally performed the services described in this documentation, as scribed by Zeyad Hall in my presence, and it is both accurate and  complete.    The note accurately reflects work and decisions made by me.  Reba Stern  10/21/2018  7:28 PM DEMETRICE

## 2018-10-23 ENCOUNTER — TELEPHONE (OUTPATIENT)
Dept: MEDICAL GROUP | Facility: PHYSICIAN GROUP | Age: 68
End: 2018-10-23

## 2018-10-23 DIAGNOSIS — M25.512 LEFT SHOULDER PAIN, UNSPECIFIED CHRONICITY: ICD-10-CM

## 2018-10-23 NOTE — TELEPHONE ENCOUNTER
1. Caller Name: Derek                                         Call Back Number: [phone] (home)       Patient approves a detailed voicemail message: yes    2. SPECIFIC Action To Be Taken: Orders pending, please sign.    3. Diagnosis/Clinical Reason for Request: Tear in rotator cudd     4. Specialty & Provider Name/Lab/Imaging Location: [facility]: Benjie Lara M.D.       5. Is appointment scheduled for requested order/referral: no    Patient was informed they will receive a return phone call from the office ONLY if there are any questions before processing their request. Advised to call back if they haven't received a call from the referral department in 5 days.

## 2018-11-09 ENCOUNTER — TELEPHONE (OUTPATIENT)
Dept: MEDICAL GROUP | Facility: PHYSICIAN GROUP | Age: 68
End: 2018-11-09

## 2018-11-09 DIAGNOSIS — Z85.820 HISTORY OF MELANOMA: ICD-10-CM

## 2018-11-09 NOTE — TELEPHONE ENCOUNTER
1. Caller Name: Derek Guzman                                           Call Back Number: [phone] (home)         Patient approves a detailed voicemail message: N\A    2. SPECIFIC Action To Be Taken: Orders pending, please sign.    3. Diagnosis/Clinical Reason for Request: Follow up for yearly check up and past history of Melanoma/Cancer.     4. Specialty & Provider Name/Lab/Imaging Location: [facility]    5. Is appointment scheduled for requested order/referral: no    Patient was informed they will receive a return phone call from the office ONLY if there are any questions before processing their request. Advised to call back if they haven't received a call from the referral department in 5 days.

## 2018-11-09 NOTE — TELEPHONE ENCOUNTER
VOICEMAIL  1. Caller Name: Derek Guzman                        Call Back Number: [phone] (home)       2. Message: Pt lvm requesting referral to Derm . I called and LVM for Pt to call back with diagnosis so we can associate the referral.     3. Patient approves office to leave a detailed voicemail/MyChart message: yes

## 2018-11-20 ENCOUNTER — OFFICE VISIT (OUTPATIENT)
Dept: DERMATOLOGY | Facility: IMAGING CENTER | Age: 68
End: 2018-11-20
Payer: COMMERCIAL

## 2018-11-20 ENCOUNTER — HOSPITAL ENCOUNTER (OUTPATIENT)
Facility: MEDICAL CENTER | Age: 68
End: 2018-11-20
Attending: DERMATOLOGY
Payer: COMMERCIAL

## 2018-11-20 DIAGNOSIS — D48.5 NEOPLASM OF UNCERTAIN BEHAVIOR OF SKIN: ICD-10-CM

## 2018-11-20 DIAGNOSIS — L82.1 SEBORRHEIC KERATOSES: ICD-10-CM

## 2018-11-20 DIAGNOSIS — D22.9 MULTIPLE NEVI: ICD-10-CM

## 2018-11-20 DIAGNOSIS — Z86.006 HISTORY OF MELANOMA IN SITU: ICD-10-CM

## 2018-11-20 LAB — PATHOLOGY CONSULT NOTE: NORMAL

## 2018-11-20 PROCEDURE — 11100 PR BIOPSY OF SKIN LESION: CPT | Performed by: DERMATOLOGY

## 2018-11-20 PROCEDURE — 99213 OFFICE O/P EST LOW 20 MIN: CPT | Mod: 25 | Performed by: DERMATOLOGY

## 2018-11-20 PROCEDURE — 88305 TISSUE EXAM BY PATHOLOGIST: CPT

## 2018-11-20 ASSESSMENT — ENCOUNTER SYMPTOMS
CHILLS: 0
FEVER: 0
WEIGHT LOSS: 0
CONSTITUTIONAL NEGATIVE: 1

## 2019-11-19 ENCOUNTER — OFFICE VISIT (OUTPATIENT)
Dept: DERMATOLOGY | Facility: IMAGING CENTER | Age: 69
End: 2019-11-19
Payer: MEDICARE

## 2019-11-19 DIAGNOSIS — D22.9 MULTIPLE NEVI: ICD-10-CM

## 2019-11-19 DIAGNOSIS — L73.8 SEBACEOUS HYPERPLASIA: ICD-10-CM

## 2019-11-19 DIAGNOSIS — L82.1 SEBORRHEIC KERATOSES: ICD-10-CM

## 2019-11-19 DIAGNOSIS — Z86.006 HISTORY OF MELANOMA IN SITU: ICD-10-CM

## 2019-11-19 PROCEDURE — 99213 OFFICE O/P EST LOW 20 MIN: CPT | Performed by: DERMATOLOGY

## 2019-11-19 ASSESSMENT — ENCOUNTER SYMPTOMS
CONSTITUTIONAL NEGATIVE: 1
CHILLS: 0
WEIGHT LOSS: 0
FEVER: 0

## 2019-11-19 NOTE — PROGRESS NOTES
Dermatology Follow-Up Visit    Chief Complaint   Patient presents with   • Annual Exam     NELL       Subjective:     HPI:   Derek Guzman is a 66 y.o. female here to discuss the evaluation and management of:    Annual NELL   New spot of concern today    HPI/location: tan lesion behind RT ear  Time present: noticed 6 months, but unsure if present longer  Painful lesion: No  Itching lesion: No  Enlarging lesion: No  Anything make it better or worse?    H/o MIS left upper extremity, excised 2013.  Seb derm - under control/not discussed  Telogen effluvium - no longer active issue    Annual Exam   Pertinent negatives include no chills, fever or rash.       Review of Systems   Constitutional: Negative.  Negative for chills, fever, malaise/fatigue and weight loss.   Skin: Negative for rash.       Allergies   Allergen Reactions   • Tape Rash     blisters       Current medicines (including changes today)  Current Outpatient Medications   Medication Sig Dispense Refill   • azelastine (ASTELIN) 137 MCG/SPRAY nasal spray [place] 1 Spray in nose 2 times a day. 30 mL 3   • Multiple Vitamin (MULTI-VITAMIN PO) Take  by mouth.     • Ascorbic Acid (VITAMIN C PO) Take  by mouth.     • Naproxen Sodium (ALEVE PO) Take  by mouth.     • diphenhydrAMINE (BENADRYL) 25 MG Tab Take 25 mg by mouth at bedtime as needed for Sleep.     • cetirizine (ZYRTEC) 10 MG TABS Take 10 mg by mouth 2 times a day.     • Terbinafine (LAMISIL ADVANCED) 1 % Gel 1 Application by Apply externally route 2 Times a Day. (Patient not taking: Reported on 2019) 1 Tube 3   • Calcium Carbonate-Vitamin D (CALCIUM + D PO) Take 1 Tab by mouth every day.       No current facility-administered medications for this visit.        Social History     Tobacco Use   • Smoking status: Former Smoker     Packs/day: 0.50     Years: 7.00     Pack years: 3.50     Types: Cigarettes     Last attempt to quit: 1978     Years since quittin.9   • Smokeless tobacco: Never Used    • Tobacco comment: avoid all tobacco products   Substance Use Topics   • Alcohol use: No     Alcohol/week: 0.0 oz   • Drug use: No       Patient Active Problem List    Diagnosis Date Noted   • Elevated LDL cholesterol level 07/23/2018   • Prediabetes 07/23/2018   • Osteopenia of multiple sites 07/31/2017   • Obesity (BMI 30-39.9) 07/31/2017   • Primary osteoarthritis of both hands 07/31/2017   • Sigmoid diverticulosis 08/17/2016   • Degenerative disc disease, lumbar 09/29/2014   • History of melanoma 10/10/2013   • History of colonoscopy with polypectomy 06/02/2010   • Non-seasonal allergic rhinitis 10/01/2009       Family History   Problem Relation Age of Onset   • Cancer Mother         colon cancer twice, lung cancer   • Diabetes Mother    • Heart Disease Father         mi   • Diabetes Father    • Diabetes Unknown    • Heart Disease Unknown    • Cancer Unknown           Objective:     A full mucocutaneous exam was completed including: scalp, hair, ears, face, eyelids, conjunctiva, lips, gums/tongue/oropharynx neck, chest/breasts, abdomen, upper extremities (including hands/digits/fingernails), lower extremities (including feet/toes/toenails), buttocks, including external genitalia (patient refusal) with the following pertinent findings:    Physical Exam   Constitutional: She is oriented to person, place, and time and well-developed, well-nourished, and in no distress.   HENT:   Head: Normocephalic and atraumatic.       Right Ear: External ear normal.   Left Ear: External ear normal.   Nose: Nose normal.   Mouth/Throat: Oropharynx is clear and moist.   Eyes: Conjunctivae and lids are normal.   Neck: Normal range of motion. Neck supple.   Cardiovascular: Intact distal pulses.   Pulmonary/Chest: Effort normal.   Lymphadenopathy:        Head (right side): No submental, no submandibular, no preauricular, no posterior auricular and no occipital adenopathy present.        Head (left side): No submental, no  submandibular, no preauricular, no posterior auricular and no occipital adenopathy present.        Right cervical: No superficial cervical adenopathy present.       Left cervical: No superficial cervical adenopathy present.        Left axillary: No pectoral and no lateral adenopathy present.  Neurological: She is alert and oriented to person, place, and time.   Skin: Skin is warm and dry.        Psychiatric: Mood and affect normal.       Data: none applicable    Assessment and Plan:     The following treatment plan was discussed:    1. Multiple nevi  - Benign-appearing nature of lesions discussed. Advised to return to clinic for any new or concerning changes.    2. Seborrheic keratoses  - Benign-appearing nature of lesions discussed. Advised to return to clinic for any new or concerning changes.    3. Sebaceous hyperplasia  - Benign-appearing nature of lesions discussed. Advised to return to clinic for any new or concerning changes.    4. History of melanoma in situ  Skin cancer education  - discussed importance of sun protective clothing, eyewear  - discussed importance of daily use of broad spectrum sunscreen with SPF 30 or greater, as well as need for reapplication ~every 2 hours when exposed to UVR  - discussed importance of regular self-exams, ideally once per month, every 6-12 months exams in clinic  - ABCDE's of melanoma discussed  - patient to bring any new or concerning lesions to my attention      Followup: Return in about 6 months (around 5/19/2020).    Maite Gonsalez M.D.

## 2019-12-02 ENCOUNTER — PATIENT OUTREACH (OUTPATIENT)
Dept: HEALTH INFORMATION MANAGEMENT | Facility: OTHER | Age: 69
End: 2019-12-02

## 2019-12-02 NOTE — PROGRESS NOTES
1. Attempt #:1    2. HealthConnect Verified: no / MCR INS    3. Verify PCP: yes    4. Review Care Team: yes    5. WebIZ Checked & Epic Updated: No WebIZ record  · WebIZ Recommendations: FLU and SHINGRIX (Shingles)  · Is patient due for Tdap? NO  · Is patient due for Shingles? YES. Patient was not notified of copay/out of pocket cost.    6. Reviewed/Updated the following with patient:       •   Communication Preference Obtained? YES       •   Preferred Pharmacy? YES       •   Preferred Lab? YES       •   Family History (document living status of immediate family members and if + hx of cancer, diabetes, hypertension, hyperlipidemia, heart attack, stroke) YES    7. Annual Wellness Visit Scheduling  · Scheduling Status:Scheduled     8. Care Gap Scheduling (Attempt to Schedule EACH Overdue Care Gap!)     Health Maintenance Due   Topic Date Due   • HEPATITIS C SCREENING  1950   • IMM ZOSTER VACCINES (2 of 3) 07/15/2016   • MAMMOGRAM  05/15/2019   • IMM INFLUENZA (1) 09/01/2019        Scheduled patient for Annual Wellness Visit     9. PanTheryx Activation: already active    10. PanTheryx Fernando: no    11. Virtual Visits: no    12. Opt In to Text Messages: yes    13. Patient was advised: “This is a free wellness visit. The provider will screen for medical conditions to help you stay healthy. If you have other concerns to address you may be asked to discuss these at a separate visit or there may be an additional fee.”     14. Patient was informed to arrive 15 min prior to their scheduled appointment and bring in their medication bottles.

## 2019-12-11 ENCOUNTER — OFFICE VISIT (OUTPATIENT)
Dept: MEDICAL GROUP | Facility: PHYSICIAN GROUP | Age: 69
End: 2019-12-11
Payer: MEDICARE

## 2019-12-11 VITALS
SYSTOLIC BLOOD PRESSURE: 118 MMHG | HEIGHT: 65 IN | BODY MASS INDEX: 30.49 KG/M2 | TEMPERATURE: 97.1 F | WEIGHT: 183 LBS | DIASTOLIC BLOOD PRESSURE: 80 MMHG | OXYGEN SATURATION: 92 % | HEART RATE: 69 BPM

## 2019-12-11 DIAGNOSIS — M19.041 PRIMARY OSTEOARTHRITIS OF BOTH HANDS: ICD-10-CM

## 2019-12-11 DIAGNOSIS — Z12.31 ENCOUNTER FOR SCREENING MAMMOGRAM FOR MALIGNANT NEOPLASM OF BREAST: ICD-10-CM

## 2019-12-11 DIAGNOSIS — M51.36 DEGENERATIVE DISC DISEASE, LUMBAR: ICD-10-CM

## 2019-12-11 DIAGNOSIS — Z86.010 HISTORY OF COLONOSCOPY WITH POLYPECTOMY: ICD-10-CM

## 2019-12-11 DIAGNOSIS — Z85.820 HISTORY OF MELANOMA: ICD-10-CM

## 2019-12-11 DIAGNOSIS — Z00.00 MEDICARE ANNUAL WELLNESS VISIT, INITIAL: ICD-10-CM

## 2019-12-11 DIAGNOSIS — Z98.890 HISTORY OF COLONOSCOPY WITH POLYPECTOMY: ICD-10-CM

## 2019-12-11 DIAGNOSIS — E78.00 ELEVATED LDL CHOLESTEROL LEVEL: ICD-10-CM

## 2019-12-11 DIAGNOSIS — J30.89 NON-SEASONAL ALLERGIC RHINITIS, UNSPECIFIED TRIGGER: ICD-10-CM

## 2019-12-11 DIAGNOSIS — Z11.59 NEED FOR HEPATITIS C SCREENING TEST: ICD-10-CM

## 2019-12-11 DIAGNOSIS — M85.89 OSTEOPENIA OF MULTIPLE SITES: ICD-10-CM

## 2019-12-11 DIAGNOSIS — Z23 NEED FOR VACCINATION: ICD-10-CM

## 2019-12-11 DIAGNOSIS — K57.30 SIGMOID DIVERTICULOSIS: ICD-10-CM

## 2019-12-11 DIAGNOSIS — R73.03 PREDIABETES: ICD-10-CM

## 2019-12-11 DIAGNOSIS — M19.042 PRIMARY OSTEOARTHRITIS OF BOTH HANDS: ICD-10-CM

## 2019-12-11 DIAGNOSIS — E66.9 OBESITY (BMI 30-39.9): ICD-10-CM

## 2019-12-11 PROCEDURE — 90662 IIV NO PRSV INCREASED AG IM: CPT | Performed by: PHYSICIAN ASSISTANT

## 2019-12-11 PROCEDURE — G0438 PPPS, INITIAL VISIT: HCPCS | Performed by: PHYSICIAN ASSISTANT

## 2019-12-11 PROCEDURE — G0008 ADMIN INFLUENZA VIRUS VAC: HCPCS | Performed by: PHYSICIAN ASSISTANT

## 2019-12-11 ASSESSMENT — ACTIVITIES OF DAILY LIVING (ADL): BATHING_REQUIRES_ASSISTANCE: 0

## 2019-12-11 ASSESSMENT — PATIENT HEALTH QUESTIONNAIRE - PHQ9: CLINICAL INTERPRETATION OF PHQ2 SCORE: 0

## 2019-12-11 ASSESSMENT — ENCOUNTER SYMPTOMS: GENERAL WELL-BEING: GOOD

## 2019-12-11 NOTE — ASSESSMENT & PLAN NOTE
Previous melanoma which was successfully treated. Will continue to follow annually with dermatology.

## 2019-12-11 NOTE — ASSESSMENT & PLAN NOTE
Established problem, presumed stable. Is compliant with daily calcium and vitamin D supplementation. Last DEXA in 6/2016. Recommend re-check in 6/2021.

## 2019-12-11 NOTE — PROGRESS NOTES
Chief Complaint   Patient presents with   • Annual Wellness Visit         HPI:  Derek is a 69 y.o. here for Medicare Annual Wellness Visit. Is an established patient of mine.        Patient Active Problem List    Diagnosis Date Noted   • Elevated LDL cholesterol level 07/23/2018   • Prediabetes 07/23/2018   • Osteopenia of multiple sites 07/31/2017   • Obesity (BMI 30-39.9) 07/31/2017   • Primary osteoarthritis of both hands 07/31/2017   • Sigmoid diverticulosis 08/17/2016   • Degenerative disc disease, lumbar 09/29/2014   • History of melanoma 10/10/2013   • History of colonoscopy with polypectomy 06/02/2010   • Non-seasonal allergic rhinitis 10/01/2009       Current Outpatient Medications   Medication Sig Dispense Refill   • azelastine (ASTELIN) 137 MCG/SPRAY nasal spray [place] 1 Spray in nose 2 times a day. 30 mL 3   • Multiple Vitamin (MULTI-VITAMIN PO) Take  by mouth.     • Ascorbic Acid (VITAMIN C PO) Take  by mouth.     • Naproxen Sodium (ALEVE PO) Take  by mouth.     • diphenhydrAMINE (BENADRYL) 25 MG Tab Take 25 mg by mouth at bedtime as needed for Sleep.     • Calcium Carbonate-Vitamin D (CALCIUM + D PO) Take 1 Tab by mouth every day.     • cetirizine (ZYRTEC) 10 MG TABS Take 10 mg by mouth 2 times a day.       No current facility-administered medications for this visit.         Patient is taking medications as noted in medication list.  Current supplements as per medication list.     Allergies: Tape    Current social contact/activities: Pt spends March-October traveling and works with [employer].      Is patient current with immunizations? No, due for FLU and SHINGRIX (Shingles). Patient is interested in receiving FLU today.    She  reports that she quit smoking about 41 years ago. Her smoking use included cigarettes. She has a 3.50 pack-year smoking history. She has never used smokeless tobacco. She reports that she does not drink alcohol or use drugs.  Counseling given: Not Answered  Comment: avoid all  tobacco products        DPA/Advanced directive: Patient has Advanced Directive, but it is not on file. Instructed to bring in a copy to scan into their chart.    ROS:    Gait: Uses no assistive device   Ostomy: No   Other tubes: No   Amputations: No   Chronic oxygen use No   Last eye exam 1/2019   Wears hearing aids: No   : Reports urinary leakage during the last 6 months that has not interfered at all with their daily activities or sleep.          Depression Screening    Little interest or pleasure in doing things?  0 - not at all  Feeling down, depressed, or hopeless? 0 - not at all  Trouble falling or staying asleep, or sleeping too much?     Feeling tired or having little energy?     Poor appetite or overeating?     Feeling bad about yourself - or that you are a failure or have let yourself or your family down?    Trouble concentrating on things, such as reading the newspaper or watching television?    Moving or speaking so slowly that other people could have noticed.  Or the opposite - being so fidgety or restless that you have been moving around a lot more than usual?     Thoughts that you would be better off dead, or of hurting yourself?     Patient Health Questionnaire Score:        If depressive symptoms identified deferred to follow up visit unless specifically addressed in assessment and plan.    Interpretation of PHQ-9 Total Score   Score Severity   1-4 No Depression   5-9 Mild Depression   10-14 Moderate Depression   15-19 Moderately Severe Depression   20-27 Severe Depression      Screening for Cognitive Impairment    Three Minute Recall (village, kitchen, baby)  3/3    Draw clock face with all 12 numbers and set the hands to show 10 past 10.  Yes 5/5  If cognitive concerns identified, deferred for follow up unless specifically addressed in assessment and plan.    Fall Risk Assessment    Has the patient had two or more falls in the last year or any fall with injury in the last year?  No  If fall risk  identified, deferred for follow up unless specifically addressed in assessment and plan.      Safety Assessment    Throw rugs on floor.  Yes  Handrails on all stairs.  Yes  Good lighting in all hallways.  Yes  Difficulty hearing.  No  Patient counseled about all safety risks that were identified.    Functional Assessment ADLs    Are there any barriers preventing you from cooking for yourself or meeting nutritional needs?  No.    Are there any barriers preventing you from driving safely or obtaining transportation?  No.    Are there any barriers preventing you from using a telephone or calling for help?  No.    Are there any barriers preventing you from shopping?  No.    Are there any barriers preventing you from taking care of your own finances?  No.    Are there any barriers preventing you from managing your medications?    No.    Are there any barriers preventing you from showering, bathing or dressing yourself?  No.    Are you currently engaging in any exercise or physical activity?  Yes.  Pt walks dogs daily.    What is your perception of your health?  Good.    Health Maintenance Summary                Annual Wellness Visit Overdue 1950     HEPATITIS C SCREENING Overdue 1950     IMM ZOSTER VACCINES Overdue 7/15/2016      Done 5/20/2016 Imm Admin: Zoster Vaccine Live (ZVL) (Zostavax)    MAMMOGRAM Overdue 5/15/2019      Done 5/15/2018 MA-MAMMO SCREENING BILAT W/TOMOSYNTHESIS W/CAD     Patient has more history with this topic...    IMM INFLUENZA Overdue 9/1/2019     BONE DENSITY Next Due 6/17/2021      Done 6/17/2016 DS-BONE DENSITY STUDY (DEXA)     Patient has more history with this topic...    PAP SMEAR Next Due 7/23/2021      Done 7/23/2018 PATHOLOGY GYN SPECIMEN     Patient has more history with this topic...    COLONOSCOPY Next Due 8/17/2021      Done 8/17/2016 REFERRAL TO GI FOR COLONOSCOPY    IMM DTaP/Tdap/Td Vaccine Next Due 4/5/2023      Done 4/5/2013 Imm Admin: Tdap Vaccine          Patient Care  "Team:  Radha Ray P.A.-C. as PCP - General (Physician Assistant)      Social History     Tobacco Use   • Smoking status: Former Smoker     Packs/day: 0.50     Years: 7.00     Pack years: 3.50     Types: Cigarettes     Last attempt to quit: 1978     Years since quittin.9   • Smokeless tobacco: Never Used   • Tobacco comment: avoid all tobacco products   Substance Use Topics   • Alcohol use: No     Alcohol/week: 0.0 oz   • Drug use: No     Family History   Problem Relation Age of Onset   • Cancer Mother         colon cancer twice, lung cancer   • Diabetes Mother    • Heart Disease Father         mi   • Diabetes Father    • Diabetes Other    • Heart Disease Other    • Cancer Other      She  has a past medical history of Allergic rhinitis (10/1/2009), Allergy, Infectious disease, Osteopenia, Pain, and Sigmoid diverticulosis (2016).   Past Surgical History:   Procedure Laterality Date   • TIBIA ORIF Left 2017    Procedure: TIBIA ORIF;  Surgeon: Casey Jewell M.D.;  Location: [facility];  Service:    • TOE FUSION  2011    Performed by BARTOLOME ROBLES at SURGERY [facility]   • BONE GRAFT  2011    Performed by BARTOLOME ROBLES at [facility]   • SHOULDER ARTHROSCOPY  2009    right; Performed by RICHARD ELIZONDO at SURGERY [facility] DAY [facility]   • ROTATOR CUFF REPAIR  2009    Performed by RICHARD ELIZONDO at SURGERY SAME DAY [facility]S   • PB  DELIVERY ONLY           Exam:     /80 (BP Location: Left arm, Patient Position: Sitting)   Pulse 69   Temp 36.2 °C (97.1 °F) (Temporal)   Ht 1.651 m (5' 5\")   Wt 83 kg (183 lb)   SpO2 92%  Body mass index is 30.45 kg/m².    Hearing good.    Dentition good  Alert, oriented in no acute distress.  Eye contact is good, speech goal directed, affect calm  Normal S1, S2, RRR  Lungs CTA bilat      Assessment and Plan. The following treatment and monitoring plan is recommended:  "     Non-seasonal allergic rhinitis  Established problem, stable. Continues to have issues with allergies but some improvement with BID cetirizine, PRN Benadryl, and PRN Astelin nasal spray. Continue current management. We discussed that she could add on PRN Flonase which she prefers to purchase OTC.    History of colonoscopy with polypectomy  Previous polyps found on colonoscopy. 5 year follow-up advised. Due next in 8/2021.    History of melanoma  Previous melanoma which was successfully treated. Will continue to follow annually with dermatology.    Degenerative disc disease, lumbar  Established problem, stable. Has some stiffness from time-to-time but no significant pain. Will continue to monitor.    Sigmoid diverticulosis  Established problem noted on previous colonoscopy. Presumed stable. Due for next colonoscopy in 8/2021.    Osteopenia of multiple sites  Established problem, presumed stable. Is compliant with daily calcium and vitamin D supplementation. Last DEXA in 6/2016. Recommend re-check in 6/2021.    Obesity (BMI 30-39.9)  Established problem, weight has been stable since last visit in 8/2018. Current BMI is 30.45.     Primary osteoarthritis of both hands  Established problem, well-controlled with OTC Aleve. Continue current management.    Elevated LDL cholesterol level  Established problem, presumed stable. Noted to have mildly elevated LDL on last lipid profile done in 7/2018. Recommend re-check.    Prediabetes  Established problem. Unclear level of control given lack of recent lab work. A1c in 7/2018 was 5.8. Will re-check A1c.        Services suggested: No services needed at this time  Health Care Screening recommendations as per orders if indicated.  Referrals offered: PT/OT/Nutrition counseling/Behavioral Health/Smoking cessation as per orders if indicated.    Discussion today about general wellness and lifestyle habits:    · Prevent falls and reduce trip hazards; Cautioned about securing or  removing rugs.  · Have a working fire alarm and carbon monoxide detector;   · Engage in regular physical activity and social activities       Follow-up: Return in about 1 year (around 12/11/2020) for AWV.     Radha Ray P.A.-C.

## 2019-12-11 NOTE — ASSESSMENT & PLAN NOTE
Established problem. Unclear level of control given lack of recent lab work. A1c in 7/2018 was 5.8. Will re-check A1c.

## 2019-12-11 NOTE — ASSESSMENT & PLAN NOTE
Established problem, presumed stable. Noted to have mildly elevated LDL on last lipid profile done in 7/2018. Recommend re-check.

## 2019-12-11 NOTE — ASSESSMENT & PLAN NOTE
Established problem, stable. Has some stiffness from time-to-time but no significant pain. Will continue to monitor.

## 2019-12-11 NOTE — ASSESSMENT & PLAN NOTE
Established problem, weight has been stable since last visit in 8/2018. Current BMI is 30.45.  We discussed importance of maintaining healthy weight.

## 2019-12-11 NOTE — ASSESSMENT & PLAN NOTE
Established problem noted on previous colonoscopy. Presumed stable. Due for next colonoscopy in 8/2021.

## 2019-12-11 NOTE — ASSESSMENT & PLAN NOTE
Established problem, stable. Continues to have issues with allergies but some improvement with BID cetirizine, PRN Benadryl, and PRN Astelin nasal spray. Continue current management. We discussed that she could add on PRN Flonase which she prefers to purchase OTC.

## 2019-12-12 ENCOUNTER — HOSPITAL ENCOUNTER (OUTPATIENT)
Dept: LAB | Facility: MEDICAL CENTER | Age: 69
End: 2019-12-12
Attending: PHYSICIAN ASSISTANT
Payer: MEDICARE

## 2019-12-12 DIAGNOSIS — E78.00 ELEVATED LDL CHOLESTEROL LEVEL: ICD-10-CM

## 2019-12-12 DIAGNOSIS — Z11.59 NEED FOR HEPATITIS C SCREENING TEST: ICD-10-CM

## 2019-12-12 DIAGNOSIS — R73.03 PREDIABETES: ICD-10-CM

## 2019-12-12 LAB
ALBUMIN SERPL BCP-MCNC: 4.1 G/DL (ref 3.2–4.9)
ALBUMIN/GLOB SERPL: 2.1 G/DL
ALP SERPL-CCNC: 59 U/L (ref 30–99)
ALT SERPL-CCNC: 14 U/L (ref 2–50)
ANION GAP SERPL CALC-SCNC: 7 MMOL/L (ref 0–11.9)
AST SERPL-CCNC: 22 U/L (ref 12–45)
BILIRUB SERPL-MCNC: 0.5 MG/DL (ref 0.1–1.5)
BUN SERPL-MCNC: 30 MG/DL (ref 8–22)
CALCIUM SERPL-MCNC: 9.5 MG/DL (ref 8.5–10.5)
CHLORIDE SERPL-SCNC: 108 MMOL/L (ref 96–112)
CHOLEST SERPL-MCNC: 192 MG/DL (ref 100–199)
CO2 SERPL-SCNC: 24 MMOL/L (ref 20–33)
CREAT SERPL-MCNC: 0.69 MG/DL (ref 0.5–1.4)
EST. AVERAGE GLUCOSE BLD GHB EST-MCNC: 117 MG/DL
FASTING STATUS PATIENT QL REPORTED: NORMAL
GLOBULIN SER CALC-MCNC: 2 G/DL (ref 1.9–3.5)
GLUCOSE SERPL-MCNC: 79 MG/DL (ref 65–99)
HBA1C MFR BLD: 5.7 % (ref 0–5.6)
HCV AB SER QL: NEGATIVE
HDLC SERPL-MCNC: 52 MG/DL
LDLC SERPL CALC-MCNC: 123 MG/DL
POTASSIUM SERPL-SCNC: 4.4 MMOL/L (ref 3.6–5.5)
PROT SERPL-MCNC: 6.1 G/DL (ref 6–8.2)
SODIUM SERPL-SCNC: 139 MMOL/L (ref 135–145)
TRIGL SERPL-MCNC: 86 MG/DL (ref 0–149)

## 2019-12-12 PROCEDURE — 80053 COMPREHEN METABOLIC PANEL: CPT

## 2019-12-12 PROCEDURE — 83036 HEMOGLOBIN GLYCOSYLATED A1C: CPT | Mod: GA

## 2019-12-12 PROCEDURE — 36415 COLL VENOUS BLD VENIPUNCTURE: CPT | Mod: GA

## 2019-12-12 PROCEDURE — 80061 LIPID PANEL: CPT

## 2019-12-12 PROCEDURE — 86803 HEPATITIS C AB TEST: CPT

## 2020-01-15 ENCOUNTER — APPOINTMENT (RX ONLY)
Dept: URBAN - METROPOLITAN AREA CLINIC 22 | Facility: CLINIC | Age: 70
Setting detail: DERMATOLOGY
End: 2020-01-15

## 2020-01-15 DIAGNOSIS — L73.8 OTHER SPECIFIED FOLLICULAR DISORDERS: ICD-10-CM

## 2020-01-15 DIAGNOSIS — L82.0 INFLAMED SEBORRHEIC KERATOSIS: ICD-10-CM

## 2020-01-15 DIAGNOSIS — D22 MELANOCYTIC NEVI: ICD-10-CM

## 2020-01-15 DIAGNOSIS — K13.79 OTHER LESIONS OF ORAL MUCOSA: ICD-10-CM

## 2020-01-15 DIAGNOSIS — Z85.820 PERSONAL HISTORY OF MALIGNANT MELANOMA OF SKIN: ICD-10-CM

## 2020-01-15 DIAGNOSIS — D18.0 HEMANGIOMA: ICD-10-CM

## 2020-01-15 DIAGNOSIS — L81.4 OTHER MELANIN HYPERPIGMENTATION: ICD-10-CM

## 2020-01-15 DIAGNOSIS — L57.8 OTHER SKIN CHANGES DUE TO CHRONIC EXPOSURE TO NONIONIZING RADIATION: ICD-10-CM

## 2020-01-15 PROBLEM — D18.01 HEMANGIOMA OF SKIN AND SUBCUTANEOUS TISSUE: Status: ACTIVE | Noted: 2020-01-15

## 2020-01-15 PROBLEM — D22.5 MELANOCYTIC NEVI OF TRUNK: Status: ACTIVE | Noted: 2020-01-15

## 2020-01-15 PROCEDURE — 99203 OFFICE O/P NEW LOW 30 MIN: CPT | Mod: 25

## 2020-01-15 PROCEDURE — ? LIQUID NITROGEN

## 2020-01-15 PROCEDURE — 17110 DESTRUCTION B9 LES UP TO 14: CPT

## 2020-01-15 PROCEDURE — ? COUNSELING

## 2020-01-15 ASSESSMENT — LOCATION SIMPLE DESCRIPTION DERM
LOCATION SIMPLE: SCALP
LOCATION SIMPLE: LEFT LOWER EXTREMITY
LOCATION SIMPLE: POSTERIOR SCALP
LOCATION SIMPLE: RIGHT UPPER EXTREMITY
LOCATION SIMPLE: INFERIOR FOREHEAD
LOCATION SIMPLE: LEFT UPPER EXTREMITY
LOCATION SIMPLE: BACK
LOCATION SIMPLE: RIGHT INFERIOR LIP
LOCATION SIMPLE: RIGHT HAND
LOCATION SIMPLE: LEFT CHEEK
LOCATION SIMPLE: RIGHT CHEEK
LOCATION SIMPLE: RIGHT LOWER EXTREMITY
LOCATION SIMPLE: LEFT HAND
LOCATION SIMPLE: INFERIOR FOREHEAD

## 2020-01-15 ASSESSMENT — LOCATION DETAILED DESCRIPTION DERM
LOCATION DETAILED: RIGHT CHEEK
LOCATION DETAILED: LEFT CHEEK
LOCATION DETAILED: RIGHT DORSAL HAND
LOCATION DETAILED: LEFT ANTERIOR THIGH
LOCATION DETAILED: LEFT SUPERIOR OCCIPITAL SCALP
LOCATION DETAILED: LEFT DORSAL FOREARM
LOCATION DETAILED: LEFT MID BACK
LOCATION DETAILED: RIGHT DORSAL FOREARM
LOCATION DETAILED: INFERIOR MID FOREHEAD
LOCATION DETAILED: LEFT DORSAL HAND
LOCATION DETAILED: LEFT UPPER BACK
LOCATION DETAILED: RIGHT ANTERIOR THIGH
LOCATION DETAILED: RIGHT CENTRAL POSTAURICULAR SKIN
LOCATION DETAILED: INFERIOR MID FOREHEAD
LOCATION DETAILED: RIGHT INFERIOR VERMILION LIP

## 2020-01-15 ASSESSMENT — LOCATION ZONE DERM
LOCATION ZONE: ARM
LOCATION ZONE: SCALP
LOCATION ZONE: TRUNK
LOCATION ZONE: FACE
LOCATION ZONE: LEG
LOCATION ZONE: HAND
LOCATION ZONE: LIP
LOCATION ZONE: FACE

## 2020-01-15 NOTE — PROCEDURE: MIPS QUALITY
Quality 130: Documentation Of Current Medications In The Medical Record: Current Medications Documented
Quality 226: Preventive Care And Screening: Tobacco Use: Screening And Cessation Intervention: Patient screened for tobacco use and is an ex/non-smoker
Detail Level: Detailed
Quality 111:Pneumonia Vaccination Status For Older Adults: Pneumococcal Vaccination Previously Received

## 2020-01-22 ENCOUNTER — HOSPITAL ENCOUNTER (OUTPATIENT)
Dept: RADIOLOGY | Facility: MEDICAL CENTER | Age: 70
End: 2020-01-22
Attending: PHYSICIAN ASSISTANT
Payer: MEDICARE

## 2020-01-22 DIAGNOSIS — Z12.31 ENCOUNTER FOR SCREENING MAMMOGRAM FOR MALIGNANT NEOPLASM OF BREAST: ICD-10-CM

## 2020-01-22 PROCEDURE — 77067 SCR MAMMO BI INCL CAD: CPT

## 2020-04-29 ENCOUNTER — PATIENT MESSAGE (OUTPATIENT)
Dept: MEDICAL GROUP | Facility: PHYSICIAN GROUP | Age: 70
End: 2020-04-29

## 2020-10-21 ENCOUNTER — OFFICE VISIT (OUTPATIENT)
Dept: MEDICAL GROUP | Facility: MEDICAL CENTER | Age: 70
End: 2020-10-21
Payer: MEDICARE

## 2020-10-21 VITALS
BODY MASS INDEX: 26.33 KG/M2 | SYSTOLIC BLOOD PRESSURE: 100 MMHG | WEIGHT: 158 LBS | DIASTOLIC BLOOD PRESSURE: 50 MMHG | OXYGEN SATURATION: 96 % | HEART RATE: 71 BPM | TEMPERATURE: 97.9 F | RESPIRATION RATE: 16 BRPM | HEIGHT: 65 IN

## 2020-10-21 DIAGNOSIS — R73.03 PREDIABETES: ICD-10-CM

## 2020-10-21 DIAGNOSIS — E78.2 MIXED HYPERLIPIDEMIA: ICD-10-CM

## 2020-10-21 DIAGNOSIS — M85.89 OSTEOPENIA OF MULTIPLE SITES: ICD-10-CM

## 2020-10-21 DIAGNOSIS — Z85.820 HISTORY OF MELANOMA: ICD-10-CM

## 2020-10-21 DIAGNOSIS — J30.89 NON-SEASONAL ALLERGIC RHINITIS, UNSPECIFIED TRIGGER: ICD-10-CM

## 2020-10-21 DIAGNOSIS — E78.00 ELEVATED LDL CHOLESTEROL LEVEL: ICD-10-CM

## 2020-10-21 DIAGNOSIS — Z23 NEED FOR VACCINATION: ICD-10-CM

## 2020-10-21 PROCEDURE — 90662 IIV NO PRSV INCREASED AG IM: CPT | Performed by: FAMILY MEDICINE

## 2020-10-21 PROCEDURE — 99204 OFFICE O/P NEW MOD 45 MIN: CPT | Mod: 25 | Performed by: FAMILY MEDICINE

## 2020-10-21 PROCEDURE — G0009 ADMIN PNEUMOCOCCAL VACCINE: HCPCS | Performed by: FAMILY MEDICINE

## 2020-10-21 PROCEDURE — 90732 PPSV23 VACC 2 YRS+ SUBQ/IM: CPT | Performed by: FAMILY MEDICINE

## 2020-10-21 PROCEDURE — G0008 ADMIN INFLUENZA VIRUS VAC: HCPCS | Performed by: FAMILY MEDICINE

## 2020-10-21 ASSESSMENT — PATIENT HEALTH QUESTIONNAIRE - PHQ9: CLINICAL INTERPRETATION OF PHQ2 SCORE: 0

## 2020-10-21 NOTE — PROGRESS NOTES
CC: New patient: Prediabetes, osteopenia, history of melanoma, rhinitis    HPI:  Derek presents today to establish new PCP.    Patient active and independent with all ADLs.  Has the following chronic medical issues    Prediabetes  Last A1c was 5.7.  Has been asymptomatic.  Denies polyuria or polydipsia.  Has been trying to eat healthy    Osteopenia of multiple sites  Patient has history of left ankle fracture years ago.  Currently asymptomatic.  Has been on calcium and vitamin D    History of melanoma  Has history of melanoma of the left arm excised 8 years ago.  Has no recurrence since then    Mixed hyperlipidemia  Last lipid panel showed a slightly high LDL(123).  Patient is counseled on lifestyle modification    Non-seasonal allergic rhinitis, unspecified trigger  Currently asymptomatic.  She has been using nasal spray as needed    Due for flu shot and pneumonia shot.    Patient Active Problem List    Diagnosis Date Noted   • Elevated LDL cholesterol level 07/23/2018   • Prediabetes 07/23/2018   • Osteopenia of multiple sites 07/31/2017   • Obesity (BMI 30-39.9) 07/31/2017   • Primary osteoarthritis of both hands 07/31/2017   • Sigmoid diverticulosis 08/17/2016   • Degenerative disc disease, lumbar 09/29/2014   • History of melanoma 10/10/2013   • History of colonoscopy with polypectomy 06/02/2010   • Non-seasonal allergic rhinitis 10/01/2009       Current Outpatient Medications   Medication Sig Dispense Refill   • azelastine (ASTELIN) 137 MCG/SPRAY nasal spray [place] 1 Spray in nose 2 times a day. 30 mL 3   • Multiple Vitamin (MULTI-VITAMIN PO) Take  by mouth.     • Ascorbic Acid (VITAMIN C PO) Take  by mouth.     • Naproxen Sodium (ALEVE PO) Take  by mouth.     • diphenhydrAMINE (BENADRYL) 25 MG Tab Take 25 mg by mouth at bedtime as needed for Sleep.     • Calcium Carbonate-Vitamin D (CALCIUM + D PO) Take 1 Tab by mouth every day.     • cetirizine (ZYRTEC) 10 MG TABS Take 10 mg by mouth 2 times a day.       No  current facility-administered medications for this visit.          Allergies as of 10/21/2020 - Reviewed 10/21/2020   Allergen Reaction Noted   • Tape Rash 06/10/2011        Social History     Socioeconomic History   • Marital status:      Spouse name: Not on file   • Number of children: Not on file   • Years of education: Not on file   • Highest education level: Not on file   Occupational History   • Not on file   Social Needs   • Financial resource strain: Not on file   • Food insecurity     Worry: Never true     Inability: Never true   • Transportation needs     Medical: Not on file     Non-medical: Not on file   Tobacco Use   • Smoking status: Former Smoker     Packs/day: 0.50     Years: 7.00     Pack years: 3.50     Types: Cigarettes     Quit date: 1978     Years since quittin.8   • Smokeless tobacco: Never Used   • Tobacco comment: avoid all tobacco products   Substance and Sexual Activity   • Alcohol use: No     Alcohol/week: 0.0 oz   • Drug use: No   • Sexual activity: Yes     Partners: Male   Lifestyle   • Physical activity     Days per week: Not on file     Minutes per session: Not on file   • Stress: Not on file   Relationships   • Social connections     Talks on phone: Not on file     Gets together: Not on file     Attends Gnosticism service: Not on file     Active member of club or organization: Not on file     Attends meetings of clubs or organizations: Not on file     Relationship status: Not on file   • Intimate partner violence     Fear of current or ex partner: Not on file     Emotionally abused: Not on file     Physically abused: Not on file     Forced sexual activity: Not on file   Other Topics Concern   • Not on file   Social History Narrative   • Not on file       Family History   Problem Relation Age of Onset   • Cancer Mother         colon cancer twice, lung cancer   • Diabetes Mother    • Heart Disease Father         mi   • Diabetes Father    • Diabetes Other    • Heart  "Disease Other    • Cancer Other        Past Surgical History:   Procedure Laterality Date   • TIBIA ORIF Left 2017    Procedure: TIBIA ORIF;  Surgeon: Casey Jewell M.D.;  Location: SURGERY [facility];  Service:    • TOE FUSION  2011    Performed by BARTOLOME ROBLES at SURGERY [facility]   • BONE GRAFT  2011    Performed by BARTOLOME ROBLES at SURGERY [facility]   • SHOULDER ARTHROSCOPY  2009    right; Performed by RICHARD ELIZONDO at SURGERY SAME DAY [facility]   • ROTATOR CUFF REPAIR  2009    Performed by RICHARD ELIZONDO at SURGERY SAME DAY [facility]   • PB  DELIVERY ONLY         ROS:  Denies any Headache, Blurred Vision, Confusion Chest pain,  Shortness of breath,  Abdominal pain, Changes of bowel or bladder, Lower ext edema, Fevers, Nights sweats, Weight Changes, Focal weakness or numbness.  All other systems are negative.    /50 (BP Location: Right arm, Patient Position: Sitting, BP Cuff Size: Adult)   Pulse 71   Temp 36.6 °C (97.9 °F) (Temporal)   Resp 16   Ht 1.651 m (5' 5\")   Wt 71.7 kg (158 lb)   SpO2 96%   BMI 26.29 kg/m²     Physical Exam:  Gen:         Alert and oriented, No apparent distress.  HEENT:   [place], TM clear,  Oralpharynx no erythema or exudates.  Neck:       No Jugular venous distension, Lymphadenopathy, Thyromegaly, Bruits.  Lungs:     Clear to auscultation bilaterally  CV:          Regular rate and rhythm. No murmurs, rubs or gallops.  Abd:         Soft non tender, non distended. Normal active bowel sounds. No                                        Hepatosplenomegaly, No pulsatile masses.  Ext:          No clubbing, cyanosis, edema.      Assessment and Plan.   69 y.o. female     1. Prediabetes  Last A1c was 5.7.  Has been asymptomatic  Patient is counseled on lifestyle modification    - CBC WITH DIFFERENTIAL; Future  - HEMOGLOBIN A1C; Future  - Comp Metabolic Panel; Future  - Lipid Profile; Future    2. " Osteopenia of multiple sites  Patient has history of left ankle fracture years ago.  Currently asymptomatic  Continuing calcium and vitamin D    3. History of melanoma  Has history of melanoma of the left arm excised 8 years ago.  No recurrence    4. Mixed hyperlipidemia  Last lipid panel showed a slightly high LDL.  Patient is counseled on lifestyle modification    - Lipid Profile; Future    5. Non-seasonal allergic rhinitis, unspecified trigger  Stable.   Patient using nasal spray as needed    6. Need for vaccination  Due for flu shot and pneumonia shot.  Both given today    - Pneumococal Polysaccharide Vaccine 23-Valent =>3YO SQ/IM  - INFLUENZA VACCINE, HIGH DOSE (65+ ONLY)

## 2020-11-19 ENCOUNTER — HOSPITAL ENCOUNTER (OUTPATIENT)
Dept: LAB | Facility: MEDICAL CENTER | Age: 70
End: 2020-11-19
Attending: FAMILY MEDICINE
Payer: MEDICARE

## 2020-11-19 DIAGNOSIS — R73.03 PREDIABETES: ICD-10-CM

## 2020-11-19 DIAGNOSIS — E78.2 MIXED HYPERLIPIDEMIA: ICD-10-CM

## 2020-11-19 LAB
ALBUMIN SERPL BCP-MCNC: 4.3 G/DL (ref 3.2–4.9)
ALBUMIN/GLOB SERPL: 2 G/DL
ALP SERPL-CCNC: 69 U/L (ref 30–99)
ALT SERPL-CCNC: 24 U/L (ref 2–50)
ANION GAP SERPL CALC-SCNC: 7 MMOL/L (ref 7–16)
AST SERPL-CCNC: 27 U/L (ref 12–45)
BASOPHILS # BLD AUTO: 1.2 % (ref 0–1.8)
BASOPHILS # BLD: 0.05 K/UL (ref 0–0.12)
BILIRUB SERPL-MCNC: 0.4 MG/DL (ref 0.1–1.5)
BUN SERPL-MCNC: 20 MG/DL (ref 8–22)
CALCIUM SERPL-MCNC: 10.1 MG/DL (ref 8.5–10.5)
CHLORIDE SERPL-SCNC: 110 MMOL/L (ref 96–112)
CHOLEST SERPL-MCNC: 213 MG/DL (ref 100–199)
CO2 SERPL-SCNC: 28 MMOL/L (ref 20–33)
CREAT SERPL-MCNC: 0.62 MG/DL (ref 0.5–1.4)
EOSINOPHIL # BLD AUTO: 0.25 K/UL (ref 0–0.51)
EOSINOPHIL NFR BLD: 6.1 % (ref 0–6.9)
ERYTHROCYTE [DISTWIDTH] IN BLOOD BY AUTOMATED COUNT: 46.4 FL (ref 35.9–50)
EST. AVERAGE GLUCOSE BLD GHB EST-MCNC: 111 MG/DL
GLOBULIN SER CALC-MCNC: 2.1 G/DL (ref 1.9–3.5)
GLUCOSE SERPL-MCNC: 76 MG/DL (ref 65–99)
HBA1C MFR BLD: 5.5 % (ref 0–5.6)
HCT VFR BLD AUTO: 47.4 % (ref 37–47)
HDLC SERPL-MCNC: 67 MG/DL
HGB BLD-MCNC: 15 G/DL (ref 12–16)
IMM GRANULOCYTES # BLD AUTO: 0 K/UL (ref 0–0.11)
IMM GRANULOCYTES NFR BLD AUTO: 0 % (ref 0–0.9)
LDLC SERPL CALC-MCNC: 133 MG/DL
LYMPHOCYTES # BLD AUTO: 1.53 K/UL (ref 1–4.8)
LYMPHOCYTES NFR BLD: 37.6 % (ref 22–41)
MCH RBC QN AUTO: 31.2 PG (ref 27–33)
MCHC RBC AUTO-ENTMCNC: 31.6 G/DL (ref 33.6–35)
MCV RBC AUTO: 98.5 FL (ref 81.4–97.8)
MONOCYTES # BLD AUTO: 0.31 K/UL (ref 0–0.85)
MONOCYTES NFR BLD AUTO: 7.6 % (ref 0–13.4)
NEUTROPHILS # BLD AUTO: 1.93 K/UL (ref 2–7.15)
NEUTROPHILS NFR BLD: 47.5 % (ref 44–72)
NRBC # BLD AUTO: 0 K/UL
NRBC BLD-RTO: 0 /100 WBC
PLATELET # BLD AUTO: 214 K/UL (ref 164–446)
PMV BLD AUTO: 11 FL (ref 9–12.9)
POTASSIUM SERPL-SCNC: 4.6 MMOL/L (ref 3.6–5.5)
PROT SERPL-MCNC: 6.4 G/DL (ref 6–8.2)
RBC # BLD AUTO: 4.81 M/UL (ref 4.2–5.4)
SODIUM SERPL-SCNC: 145 MMOL/L (ref 135–145)
TRIGL SERPL-MCNC: 66 MG/DL (ref 0–149)
WBC # BLD AUTO: 4.1 K/UL (ref 4.8–10.8)

## 2020-11-19 PROCEDURE — 80053 COMPREHEN METABOLIC PANEL: CPT

## 2020-11-19 PROCEDURE — 83036 HEMOGLOBIN GLYCOSYLATED A1C: CPT | Mod: GA

## 2020-11-19 PROCEDURE — 80061 LIPID PANEL: CPT

## 2020-11-19 PROCEDURE — 36415 COLL VENOUS BLD VENIPUNCTURE: CPT

## 2020-11-19 PROCEDURE — 85025 COMPLETE CBC W/AUTO DIFF WBC: CPT

## 2020-11-20 ENCOUNTER — TELEPHONE (OUTPATIENT)
Dept: MEDICAL GROUP | Facility: MEDICAL CENTER | Age: 70
End: 2020-11-20

## 2020-11-20 NOTE — TELEPHONE ENCOUNTER
Phone Number Called: [phone]    Call outcome: Did not leave a detailed message. Requested patient to call back.    Message: Called to inform patient that cholesterol is high so Dr Cheek recommends a low fat diet. Patient glucose is slightly improved and would like patient to continue on low carb diet. Patient WBC is a little low and Dr Cheek will continue to monitor the CBC. POC will be discussed with patient during next visit.       ----- Message from Dave Cheek M.D. sent at 11/20/2020  7:04 AM PST -----  Abnormal result,   You have high cholesterol, recommend low carbohydrate and low-fat diet, however blood glucose has improved.   White blood cells  is slightly low, will continue monitor CBC.   will discuss it next visit. Please let the patient know.

## 2020-12-30 ENCOUNTER — APPOINTMENT (RX ONLY)
Dept: URBAN - METROPOLITAN AREA CLINIC 4 | Facility: CLINIC | Age: 70
Setting detail: DERMATOLOGY
End: 2020-12-30

## 2020-12-30 DIAGNOSIS — D22 MELANOCYTIC NEVI: ICD-10-CM

## 2020-12-30 DIAGNOSIS — L81.4 OTHER MELANIN HYPERPIGMENTATION: ICD-10-CM

## 2020-12-30 DIAGNOSIS — Z85.820 PERSONAL HISTORY OF MALIGNANT MELANOMA OF SKIN: ICD-10-CM | Status: STABLE

## 2020-12-30 DIAGNOSIS — L73.8 OTHER SPECIFIED FOLLICULAR DISORDERS: ICD-10-CM

## 2020-12-30 DIAGNOSIS — Z00.8 ENCOUNTER FOR OTHER GENERAL EXAMINATION: ICD-10-CM

## 2020-12-30 DIAGNOSIS — L82.1 OTHER SEBORRHEIC KERATOSIS: ICD-10-CM

## 2020-12-30 DIAGNOSIS — D18.0 HEMANGIOMA: ICD-10-CM

## 2020-12-30 PROBLEM — D18.01 HEMANGIOMA OF SKIN AND SUBCUTANEOUS TISSUE: Status: ACTIVE | Noted: 2020-12-30

## 2020-12-30 PROBLEM — D22.5 MELANOCYTIC NEVI OF TRUNK: Status: ACTIVE | Noted: 2020-12-30

## 2020-12-30 PROCEDURE — ? SUNSCREEN RECOMMENDATIONS

## 2020-12-30 PROCEDURE — ? COUNSELING

## 2020-12-30 PROCEDURE — ? REFERRAL CORRESPONDENCE

## 2020-12-30 PROCEDURE — 99213 OFFICE O/P EST LOW 20 MIN: CPT

## 2020-12-30 ASSESSMENT — LOCATION DETAILED DESCRIPTION DERM
LOCATION DETAILED: LEFT INFERIOR CENTRAL MALAR CHEEK
LOCATION DETAILED: LEFT PROXIMAL DORSAL FOREARM
LOCATION DETAILED: UPPER STERNUM
LOCATION DETAILED: LOWER STERNUM
LOCATION DETAILED: RIGHT RADIAL DORSAL HAND
LOCATION DETAILED: INFERIOR MID FOREHEAD
LOCATION DETAILED: RIGHT POSTERIOR SHOULDER
LOCATION DETAILED: MIDDLE STERNUM
LOCATION DETAILED: LEFT RADIAL DORSAL HAND
LOCATION DETAILED: LEFT POSTERIOR SHOULDER

## 2020-12-30 ASSESSMENT — LOCATION SIMPLE DESCRIPTION DERM
LOCATION SIMPLE: LEFT FOREARM
LOCATION SIMPLE: RIGHT HAND
LOCATION SIMPLE: LEFT CHEEK
LOCATION SIMPLE: LEFT SHOULDER
LOCATION SIMPLE: LEFT HAND
LOCATION SIMPLE: CHEST
LOCATION SIMPLE: INFERIOR FOREHEAD
LOCATION SIMPLE: RIGHT SHOULDER

## 2020-12-30 ASSESSMENT — LOCATION ZONE DERM
LOCATION ZONE: TRUNK
LOCATION ZONE: FACE
LOCATION ZONE: ARM
LOCATION ZONE: HAND

## 2020-12-30 NOTE — PROCEDURE: MIPS QUALITY
Detail Level: Detailed
Quality 130: Documentation Of Current Medications In The Medical Record: Current Medications Documented
Quality 137: Melanoma: Continuity Of Care - Recall System: Patient information entered into a recall system that includes: target date for the next exam specified AND a process to follow up with patients regarding missed or unscheduled appointments
Quality 138: Melanoma: Coordination Of Care: A treatment plan was communicated to the physicians providing continuing care within one month of diagnosis outlining: diagnosis, tumor thickness and a plan for surgery or alternate care.
Quality 226: Preventive Care And Screening: Tobacco Use: Screening And Cessation Intervention: Patient screened for tobacco use and is an ex/non-smoker
Quality 111:Pneumonia Vaccination Status For Older Adults: Pneumococcal Vaccination Previously Received

## 2020-12-30 NOTE — PROCEDURE: SUNSCREEN RECOMMENDATIONS

## 2021-02-18 ENCOUNTER — HOSPITAL ENCOUNTER (OUTPATIENT)
Dept: RADIOLOGY | Facility: MEDICAL CENTER | Age: 71
End: 2021-02-18
Attending: FAMILY MEDICINE
Payer: MEDICARE

## 2021-02-18 DIAGNOSIS — Z12.31 VISIT FOR SCREENING MAMMOGRAM: ICD-10-CM

## 2021-02-18 PROCEDURE — 77063 BREAST TOMOSYNTHESIS BI: CPT

## 2021-08-11 ENCOUNTER — PATIENT MESSAGE (OUTPATIENT)
Dept: MEDICAL GROUP | Facility: MEDICAL CENTER | Age: 71
End: 2021-08-11

## 2021-08-11 DIAGNOSIS — R53.83 FATIGUE, UNSPECIFIED TYPE: ICD-10-CM

## 2021-08-11 DIAGNOSIS — R73.03 PREDIABETES: ICD-10-CM

## 2021-08-11 DIAGNOSIS — E78.2 MIXED HYPERLIPIDEMIA: ICD-10-CM

## 2021-08-11 NOTE — TELEPHONE ENCOUNTER
From: Derek Guzman  To: Physician Dave Cheek  Sent: 8/11/2021 4:24 PM PDT  Subject: wellness appointment    I am seeing you on the 23 rd of Aug. for my wellness exam. My last blood work was in 2020 do I need new blood work before I see you?

## 2021-08-23 ENCOUNTER — OFFICE VISIT (OUTPATIENT)
Dept: MEDICAL GROUP | Facility: MEDICAL CENTER | Age: 71
End: 2021-08-23
Payer: MEDICARE

## 2021-08-23 VITALS
HEART RATE: 64 BPM | DIASTOLIC BLOOD PRESSURE: 50 MMHG | WEIGHT: 155.65 LBS | TEMPERATURE: 98 F | SYSTOLIC BLOOD PRESSURE: 100 MMHG | OXYGEN SATURATION: 96 % | HEIGHT: 65 IN | RESPIRATION RATE: 16 BRPM | BODY MASS INDEX: 25.93 KG/M2

## 2021-08-23 DIAGNOSIS — Z00.00 MEDICARE ANNUAL WELLNESS VISIT, SUBSEQUENT: ICD-10-CM

## 2021-08-23 DIAGNOSIS — Z78.0 ASYMPTOMATIC POSTMENOPAUSAL STATE: ICD-10-CM

## 2021-08-23 DIAGNOSIS — E78.2 MIXED HYPERLIPIDEMIA: ICD-10-CM

## 2021-08-23 DIAGNOSIS — R73.02 IGT (IMPAIRED GLUCOSE TOLERANCE): ICD-10-CM

## 2021-08-23 DIAGNOSIS — J30.9 CHRONIC ALLERGIC RHINITIS: ICD-10-CM

## 2021-08-23 DIAGNOSIS — M85.89 OSTEOPENIA OF MULTIPLE SITES: ICD-10-CM

## 2021-08-23 DIAGNOSIS — Z85.820 HISTORY OF MELANOMA: ICD-10-CM

## 2021-08-23 PROCEDURE — G0439 PPPS, SUBSEQ VISIT: HCPCS | Performed by: FAMILY MEDICINE

## 2021-08-23 ASSESSMENT — ACTIVITIES OF DAILY LIVING (ADL): BATHING_REQUIRES_ASSISTANCE: 0

## 2021-08-23 ASSESSMENT — PATIENT HEALTH QUESTIONNAIRE - PHQ9: CLINICAL INTERPRETATION OF PHQ2 SCORE: 0

## 2021-08-23 ASSESSMENT — ENCOUNTER SYMPTOMS: GENERAL WELL-BEING: GOOD

## 2021-08-23 ASSESSMENT — FIBROSIS 4 INDEX: FIB4 SCORE: 1.78

## 2021-08-23 NOTE — PROGRESS NOTES
Chief Complaint   Patient presents with   • Annual Exam       HPI:  Derek Guzman is a 70 y.o. here for Medicare Annual Wellness Visit     Medicare annual wellness visit, subsequent  Preventive measures and chronic medical issues reviewed.    IGT  However last A1c was 5.5,and it was 5.7 before that.  Has been asymptomatic.  Denies polyuria or polydipsia.       Mixed hyperlipidemia  Last lipid panel showed a slightly high LDL(123).  Patient is counseled on lifestyle modification     Chronic allergic rhinitis  Patient is currently asymptomatic.  She has been using nasal spray Zyrtec as needed    History of melanoma  Patient has history of melanoma of the left arm excised 9 years ago.    She has no recurrence since then.     Osteopenia of multiple sites  Patient has history of left ankle fracture years ago.  Currently asymptomatic.  Has been on calcium and vitamin D    Patient Active Problem List    Diagnosis Date Noted   • Elevated LDL cholesterol level 07/23/2018   • Prediabetes 07/23/2018   • Osteopenia of multiple sites 07/31/2017   • Obesity (BMI 30-39.9) 07/31/2017   • Primary osteoarthritis of both hands 07/31/2017   • Sigmoid diverticulosis 08/17/2016   • Degenerative disc disease, lumbar 09/29/2014   • History of melanoma 10/10/2013   • History of colonoscopy with polypectomy 06/02/2010   • Non-seasonal allergic rhinitis 10/01/2009       Current Outpatient Medications   Medication Sig Dispense Refill   • azelastine (ASTELIN) 137 MCG/SPRAY nasal spray [place] 1 Spray in nose 2 times a day. 30 mL 3   • Multiple Vitamin (MULTI-VITAMIN PO) Take  by mouth.     • Ascorbic Acid (VITAMIN C PO) Take  by mouth.     • Naproxen Sodium (ALEVE PO) Take  by mouth.     • diphenhydrAMINE (BENADRYL) 25 MG Tab Take 25 mg by mouth at bedtime as needed for Sleep.     • Calcium Carbonate-Vitamin D (CALCIUM + D PO) Take 1 Tab by mouth every day.     • cetirizine (ZYRTEC) 10 MG TABS Take 10 mg by mouth 2 times a day.       No  current facility-administered medications for this visit.          Current supplements as per medication list.     Allergies: Tape    Current social contact/activities:      She  reports that she quit smoking about 43 years ago. Her smoking use included cigarettes. She has a 3.50 pack-year smoking history. She has never used smokeless tobacco. She reports that she does not drink alcohol and does not use drugs.  Counseling given: Not Answered  Comment: avoid all tobacco products      DPA/Advanced Directive:  Patient has Advanced Directive on file.     ROS:    Gait: Uses no assistive device  Ostomy: No  Other tubes: No  Amputations: No  Chronic oxygen use: No  Last eye exam: 2019  Wears hearing aids: No   : Denies any urinary leakage during the last 6 months    Screening:    Depression Screening    Little interest or pleasure in doing things?  0 - not at all  Feeling down, depressed , or hopeless? 0 - not at all  Patient Health Questionnaire Score: 0     If depressive symptoms identified deferred to follow up visit unless specifically addressed in assessment and plan.    Interpretation of PHQ-9 Total Score   Score Severity   1-4 No Depression   5-9 Mild Depression   10-14 Moderate Depression   15-19 Moderately Severe Depression   20-27 Severe Depression    Screening for Cognitive Impairment    Three Minute Recall (captain, garden, picture) 1/3    Reece clock face with all 12 numbers and set the hands to show 5 past 8.  No    Cognitive concerns identified deferred for follow up unless specifically addressed in assessment and plan.    Fall Risk Assessment    Has the patient had two or more falls in the last year or any fall with injury in the last year?  No    Safety Assessment    Throw rugs on floor.  Yes  Handrails on all stairs.  Yes  Good lighting in all hallways.  Yes  Difficulty hearing.  No  Patient counseled about all safety risks that were identified.    Functional Assessment ADLs    Are there any barriers  preventing you from cooking for yourself or meeting nutritional needs?  No.    Are there any barriers preventing you from driving safely or obtaining transportation?  No.    Are there any barriers preventing you from using a telephone or calling for help?  No.    Are there any barriers preventing you from shopping?  No.    Are there any barriers preventing you from taking care of your own finances?  No.    Are there any barriers preventing you from managing your medications?  No.    Are there any barriers preventing you from showering, bathing or dressing yourself?  No.    Are you currently engaging in any exercise or physical activity?  Yes.     What is your perception of your health?  Good.      Health Maintenance Summary                Annual Wellness Visit Overdue 12/11/2020      Done 12/11/2019 Visit Dx: Medicare annual wellness visit, initial     Patient has more history with this topic...    COVID-19 Vaccine Overdue 5/4/2021      Done 4/6/2021 Imm Admin: Moderna SARS-CoV-2 Vaccine    BONE DENSITY Overdue 6/17/2021      Done 6/17/2016 DS-BONE DENSITY STUDY (DEXA)     Patient has more history with this topic...    PAP SMEAR Overdue 7/23/2021      Done 7/23/2018 THINPREP PAP WITH HPV     Patient has more history with this topic...    IMM INFLUENZA Next Due 9/1/2021      Done 10/21/2020 Imm Admin: Influenza Vaccine Adult HD     Patient has more history with this topic...    MAMMOGRAM Next Due 2/18/2022      Done 2/18/2021 MA-SCREENING MAMMO BILAT W/TOMOSYNTHESIS W/CAD     Patient has more history with this topic...    IMM DTaP/Tdap/Td Vaccine Next Due 4/5/2023      Done 4/5/2013 Imm Admin: Tdap Vaccine    COLORECTAL CANCER SCREENING Next Due 8/17/2026           Patient Care Team:  Dave Cheek M.D. as PCP - General (Geriatrics)        Social History     Tobacco Use   • Smoking status: Former Smoker     Packs/day: 0.50     Years: 7.00     Pack years: 3.50     Types: Cigarettes     Quit date: 1/1/1978      Years since quittin.6   • Smokeless tobacco: Never Used   • Tobacco comment: avoid all tobacco products   Vaping Use   • Vaping Use: Never used   Substance Use Topics   • Alcohol use: No     Alcohol/week: 0.0 oz   • Drug use: No     Family History   Problem Relation Age of Onset   • Cancer Mother         colon cancer twice, lung cancer   • Diabetes Mother    • Heart Disease Father         mi   • Diabetes Father    • Diabetes Other    • Heart Disease Other    • Cancer Other      She  has a past medical history of Allergic rhinitis (10/1/2009), Allergy, Infectious disease, Osteopenia, Pain, and Sigmoid diverticulosis (2016).   Past Surgical History:   Procedure Laterality Date   • TIBIA ORIF Left 2017    Procedure: TIBIA ORIF;  Surgeon: Casey Jewell M.D.;  Location: SURGERY [facility];  Service:    • TOE FUSION  2011    Performed by BARTOLOME ROBLES at SURGERY [facility]   • BONE GRAFT  2011    Performed by BARTOLOME ROBLES at [facility]   • SHOULDER ARTHROSCOPY  2009    right; Performed by RICHARD ELIZODNO at SURGERY SAME DAY [facility]S   • ROTATOR CUFF REPAIR  2009    Performed by RICHARD ELIZONDO at SURGERY SAME DAY [facility]   • PB  DELIVERY ONLY  1979       Exam:   There were no vitals taken for this visit. There is no height or weight on file to calculate BMI.    Hearing excellent.    Dentition good  Alert, oriented in no acute distress.  Eye contact is good, speech goal directed, affect calm    Assessment and Plan. The following treatment and monitoring plan is recommended:    70 y.o. female     1. Medicare annual wellness visit, subsequent  Preventive measures and chronic medical issues reviewed.  - Subsequent Annual Wellness Visit - Includes PPPS ()    2. IGT (impaired glucose tolerance)  However last A1c was 5.5,and it was 5.7before that..  Repeat blood work.    - Subsequent Annual Wellness Visit - Includes PPPS  ()    3. Mixed hyperlipidemia  Last lipid panel was:   Ref Range & Units 9 mo ago   Cholesterol,Tot 100 - 199 mg/dL 213 High     Triglycerides 0 - 149 mg/dL 66    HDL >=40 mg/dL 67    LDL <100 mg/dL 133 High      Multiple medication.  Patient is counseled on lifestyle education.  - Subsequent Annual Wellness Visit - Includes PPPS ()    4. Chronic allergic rhinitis  Stable.  Currently asymptomatic.  Continue on azelastine nasal spray and Zyrtec as needed.    - Subsequent Annual Wellness Visit - Includes PPPS ()    5. History of melanoma  History of melanoma that was excised by head ago.  No recurrence.  Continue management.    - Subsequent Annual Wellness Visit - Includes PPPS ()    6. Osteopenia of multiple sites  Stable.  Continue vitamin D and calcium.    - Subsequent Annual Wellness Visit - Includes PPPS ()               Services suggested: No services needed at this time  Health Care Screening: Age-appropriate preventive services recommended by USPTF and ACIP covered by Medicare were discussed today. Services ordered if indicated and agreed upon by the patient.  Referrals offered: Community-based lifestyle interventions to reduce health risks and promote self-management and wellness, fall prevention, nutrition, physical activity, tobacco-use cessation, weight loss, and mental health services as per orders if indicated.    Discussion today about general wellness and lifestyle habits:    · Prevent falls and reduce trip hazards; Cautioned about securing or removing rugs.  · Have a working fire alarm and carbon monoxide detector;   · Engage in regular physical activity and social activities     Follow-up: No follow-ups on file.

## 2021-09-22 ENCOUNTER — HOSPITAL ENCOUNTER (OUTPATIENT)
Dept: RADIOLOGY | Facility: MEDICAL CENTER | Age: 71
End: 2021-09-22
Attending: FAMILY MEDICINE
Payer: MEDICARE

## 2021-09-22 ENCOUNTER — HOSPITAL ENCOUNTER (OUTPATIENT)
Dept: LAB | Facility: MEDICAL CENTER | Age: 71
End: 2021-09-22
Attending: FAMILY MEDICINE
Payer: MEDICARE

## 2021-09-22 DIAGNOSIS — R73.03 PREDIABETES: ICD-10-CM

## 2021-09-22 DIAGNOSIS — R53.83 FATIGUE, UNSPECIFIED TYPE: ICD-10-CM

## 2021-09-22 DIAGNOSIS — E78.2 MIXED HYPERLIPIDEMIA: ICD-10-CM

## 2021-09-22 DIAGNOSIS — Z78.0 ASYMPTOMATIC POSTMENOPAUSAL STATE: ICD-10-CM

## 2021-09-22 LAB
ALBUMIN SERPL BCP-MCNC: 4.3 G/DL (ref 3.2–4.9)
ALBUMIN/GLOB SERPL: 1.8 G/DL
ALP SERPL-CCNC: 65 U/L (ref 30–99)
ALT SERPL-CCNC: 16 U/L (ref 2–50)
ANION GAP SERPL CALC-SCNC: 9 MMOL/L (ref 7–16)
AST SERPL-CCNC: 22 U/L (ref 12–45)
BASOPHILS # BLD AUTO: 0.7 % (ref 0–1.8)
BASOPHILS # BLD: 0.03 K/UL (ref 0–0.12)
BILIRUB SERPL-MCNC: 0.4 MG/DL (ref 0.1–1.5)
BUN SERPL-MCNC: 17 MG/DL (ref 8–22)
CALCIUM SERPL-MCNC: 10.4 MG/DL (ref 8.5–10.5)
CHLORIDE SERPL-SCNC: 106 MMOL/L (ref 96–112)
CHOLEST SERPL-MCNC: 222 MG/DL (ref 100–199)
CO2 SERPL-SCNC: 25 MMOL/L (ref 20–33)
CREAT SERPL-MCNC: 0.68 MG/DL (ref 0.5–1.4)
EOSINOPHIL # BLD AUTO: 0.24 K/UL (ref 0–0.51)
EOSINOPHIL NFR BLD: 5.7 % (ref 0–6.9)
ERYTHROCYTE [DISTWIDTH] IN BLOOD BY AUTOMATED COUNT: 47.1 FL (ref 35.9–50)
EST. AVERAGE GLUCOSE BLD GHB EST-MCNC: 114 MG/DL
GLOBULIN SER CALC-MCNC: 2.4 G/DL (ref 1.9–3.5)
GLUCOSE SERPL-MCNC: 84 MG/DL (ref 65–99)
HBA1C MFR BLD: 5.6 % (ref 4–5.6)
HCT VFR BLD AUTO: 46.5 % (ref 37–47)
HDLC SERPL-MCNC: 69 MG/DL
HGB BLD-MCNC: 15.2 G/DL (ref 12–16)
IMM GRANULOCYTES # BLD AUTO: 0.01 K/UL (ref 0–0.11)
IMM GRANULOCYTES NFR BLD AUTO: 0.2 % (ref 0–0.9)
LDLC SERPL CALC-MCNC: 137 MG/DL
LYMPHOCYTES # BLD AUTO: 1.64 K/UL (ref 1–4.8)
LYMPHOCYTES NFR BLD: 38.8 % (ref 22–41)
MCH RBC QN AUTO: 32.1 PG (ref 27–33)
MCHC RBC AUTO-ENTMCNC: 32.7 G/DL (ref 33.6–35)
MCV RBC AUTO: 98.3 FL (ref 81.4–97.8)
MONOCYTES # BLD AUTO: 0.37 K/UL (ref 0–0.85)
MONOCYTES NFR BLD AUTO: 8.7 % (ref 0–13.4)
NEUTROPHILS # BLD AUTO: 1.94 K/UL (ref 2–7.15)
NEUTROPHILS NFR BLD: 45.9 % (ref 44–72)
NRBC # BLD AUTO: 0 K/UL
NRBC BLD-RTO: 0 /100 WBC
PLATELET # BLD AUTO: 182 K/UL (ref 164–446)
PMV BLD AUTO: 11.2 FL (ref 9–12.9)
POTASSIUM SERPL-SCNC: 4.6 MMOL/L (ref 3.6–5.5)
PROT SERPL-MCNC: 6.7 G/DL (ref 6–8.2)
RBC # BLD AUTO: 4.73 M/UL (ref 4.2–5.4)
SODIUM SERPL-SCNC: 140 MMOL/L (ref 135–145)
TRIGL SERPL-MCNC: 79 MG/DL (ref 0–149)
TSH SERPL DL<=0.005 MIU/L-ACNC: 1.33 UIU/ML (ref 0.38–5.33)
WBC # BLD AUTO: 4.2 K/UL (ref 4.8–10.8)

## 2021-09-22 PROCEDURE — 80053 COMPREHEN METABOLIC PANEL: CPT

## 2021-09-22 PROCEDURE — 77080 DXA BONE DENSITY AXIAL: CPT

## 2021-09-22 PROCEDURE — 84443 ASSAY THYROID STIM HORMONE: CPT

## 2021-09-22 PROCEDURE — 80061 LIPID PANEL: CPT

## 2021-09-22 PROCEDURE — 85025 COMPLETE CBC W/AUTO DIFF WBC: CPT

## 2021-09-22 PROCEDURE — 83036 HEMOGLOBIN GLYCOSYLATED A1C: CPT | Mod: GA

## 2021-09-22 PROCEDURE — 36415 COLL VENOUS BLD VENIPUNCTURE: CPT

## 2022-02-15 ENCOUNTER — APPOINTMENT (RX ONLY)
Dept: URBAN - METROPOLITAN AREA CLINIC 6 | Facility: CLINIC | Age: 72
Setting detail: DERMATOLOGY
End: 2022-02-15

## 2022-02-15 DIAGNOSIS — D22 MELANOCYTIC NEVI: ICD-10-CM

## 2022-02-15 DIAGNOSIS — L82.1 OTHER SEBORRHEIC KERATOSIS: ICD-10-CM

## 2022-02-15 DIAGNOSIS — Z85.820 PERSONAL HISTORY OF MALIGNANT MELANOMA OF SKIN: ICD-10-CM

## 2022-02-15 DIAGNOSIS — L72.8 OTHER FOLLICULAR CYSTS OF THE SKIN AND SUBCUTANEOUS TISSUE: ICD-10-CM

## 2022-02-15 DIAGNOSIS — L81.4 OTHER MELANIN HYPERPIGMENTATION: ICD-10-CM

## 2022-02-15 DIAGNOSIS — Z71.89 OTHER SPECIFIED COUNSELING: ICD-10-CM

## 2022-02-15 DIAGNOSIS — D18.0 HEMANGIOMA: ICD-10-CM

## 2022-02-15 PROBLEM — D48.5 NEOPLASM OF UNCERTAIN BEHAVIOR OF SKIN: Status: ACTIVE | Noted: 2022-02-15

## 2022-02-15 PROBLEM — D18.01 HEMANGIOMA OF SKIN AND SUBCUTANEOUS TISSUE: Status: ACTIVE | Noted: 2022-02-15

## 2022-02-15 PROBLEM — D22.5 MELANOCYTIC NEVI OF TRUNK: Status: ACTIVE | Noted: 2022-02-15

## 2022-02-15 PROCEDURE — 99213 OFFICE O/P EST LOW 20 MIN: CPT | Mod: 25

## 2022-02-15 PROCEDURE — ? BIOPSY BY SHAVE METHOD

## 2022-02-15 PROCEDURE — ? SUNSCREEN RECOMMENDATIONS

## 2022-02-15 PROCEDURE — 11102 TANGNTL BX SKIN SINGLE LES: CPT

## 2022-02-15 PROCEDURE — ? COUNSELING

## 2022-02-15 PROCEDURE — ? SUNSCREEN TREATMENT REGIMEN

## 2022-02-15 ASSESSMENT — LOCATION DETAILED DESCRIPTION DERM
LOCATION DETAILED: RIGHT SUPRAPUBIC SKIN
LOCATION DETAILED: LEFT INFERIOR CENTRAL MALAR CHEEK
LOCATION DETAILED: LOWER STERNUM
LOCATION DETAILED: MIDDLE STERNUM
LOCATION DETAILED: LEFT PROXIMAL DORSAL FOREARM
LOCATION DETAILED: LEFT RADIAL DORSAL HAND
LOCATION DETAILED: LEFT POSTERIOR SHOULDER
LOCATION DETAILED: RIGHT RADIAL DORSAL HAND
LOCATION DETAILED: RIGHT POSTERIOR SHOULDER
LOCATION DETAILED: UPPER STERNUM

## 2022-02-15 ASSESSMENT — LOCATION SIMPLE DESCRIPTION DERM
LOCATION SIMPLE: LEFT CHEEK
LOCATION SIMPLE: LEFT FOREARM
LOCATION SIMPLE: RIGHT HAND
LOCATION SIMPLE: RIGHT SHOULDER
LOCATION SIMPLE: CHEST
LOCATION SIMPLE: GROIN
LOCATION SIMPLE: LEFT SHOULDER
LOCATION SIMPLE: LEFT HAND

## 2022-02-15 ASSESSMENT — LOCATION ZONE DERM
LOCATION ZONE: HAND
LOCATION ZONE: FACE
LOCATION ZONE: TRUNK
LOCATION ZONE: ARM

## 2022-02-15 NOTE — PROCEDURE: MIPS QUALITY
Quality 226: Preventive Care And Screening: Tobacco Use: Screening And Cessation Intervention: Patient screened for tobacco use and is an ex/non-smoker
Quality 130: Documentation Of Current Medications In The Medical Record: Current Medications Documented
Detail Level: Detailed
Quality 111:Pneumonia Vaccination Status For Older Adults: Pneumococcal Vaccination Previously Received
Quality 431: Preventive Care And Screening: Unhealthy Alcohol Use - Screening: Patient not identified as an unhealthy alcohol user when screened for unhealthy alcohol use using a systematic screening method

## 2022-06-30 ENCOUNTER — OFFICE VISIT (OUTPATIENT)
Dept: MEDICAL GROUP | Facility: MEDICAL CENTER | Age: 72
End: 2022-06-30
Payer: MEDICARE

## 2022-06-30 VITALS
BODY MASS INDEX: 28.72 KG/M2 | SYSTOLIC BLOOD PRESSURE: 110 MMHG | RESPIRATION RATE: 16 BRPM | WEIGHT: 168.21 LBS | DIASTOLIC BLOOD PRESSURE: 60 MMHG | HEART RATE: 60 BPM | TEMPERATURE: 97.9 F | HEIGHT: 64 IN | OXYGEN SATURATION: 96 %

## 2022-06-30 DIAGNOSIS — M51.36 DEGENERATIVE DISC DISEASE, LUMBAR: ICD-10-CM

## 2022-06-30 DIAGNOSIS — Z00.00 MEDICARE ANNUAL WELLNESS VISIT, SUBSEQUENT: ICD-10-CM

## 2022-06-30 DIAGNOSIS — Z85.820 HISTORY OF MELANOMA: ICD-10-CM

## 2022-06-30 DIAGNOSIS — R73.02 IGT (IMPAIRED GLUCOSE TOLERANCE): ICD-10-CM

## 2022-06-30 DIAGNOSIS — E78.2 MIXED HYPERLIPIDEMIA: ICD-10-CM

## 2022-06-30 DIAGNOSIS — M85.89 OSTEOPENIA OF MULTIPLE SITES: ICD-10-CM

## 2022-06-30 DIAGNOSIS — D72.819 LEUKOPENIA, UNSPECIFIED TYPE: ICD-10-CM

## 2022-06-30 PROCEDURE — 99214 OFFICE O/P EST MOD 30 MIN: CPT | Performed by: FAMILY MEDICINE

## 2022-06-30 ASSESSMENT — ACTIVITIES OF DAILY LIVING (ADL): BATHING_REQUIRES_ASSISTANCE: 0

## 2022-06-30 ASSESSMENT — ENCOUNTER SYMPTOMS: GENERAL WELL-BEING: GOOD

## 2022-06-30 ASSESSMENT — PATIENT HEALTH QUESTIONNAIRE - PHQ9: CLINICAL INTERPRETATION OF PHQ2 SCORE: 0

## 2022-06-30 ASSESSMENT — FIBROSIS 4 INDEX: FIB4 SCORE: 2.115384615384615385

## 2022-06-30 NOTE — PROGRESS NOTES
Chief Complaint   Patient presents with   • Annual Exam       HPI:  Derek Guzman is a 71 y.o. here for Medicare Annual Wellness Visit     Patient Active Problem List    Diagnosis Date Noted   • Elevated LDL cholesterol level 07/23/2018   • Prediabetes 07/23/2018   • Osteopenia of multiple sites 07/31/2017   • Obesity (BMI 30-39.9) 07/31/2017   • Primary osteoarthritis of both hands 07/31/2017   • Sigmoid diverticulosis 08/17/2016   • Degenerative disc disease, lumbar 09/29/2014   • History of melanoma 10/10/2013   • History of colonoscopy with polypectomy 06/02/2010   • Non-seasonal allergic rhinitis 10/01/2009       Current Outpatient Medications   Medication Sig Dispense Refill   • meloxicam (MOBIC) 7.5 MG Tab Take 1 Tablet by mouth every day. 30 Tablet 6   • azelastine (ASTELIN) 137 MCG/SPRAY nasal spray [place] 1 Spray in nose 2 times a day. 30 mL 3   • Multiple Vitamin (MULTI-VITAMIN PO) Take  by mouth.     • Ascorbic Acid (VITAMIN C PO) Take  by mouth.     • Naproxen Sodium (ALEVE PO) Take  by mouth.     • diphenhydrAMINE (BENADRYL) 25 MG Tab Take 25 mg by mouth at bedtime as needed for Sleep.     • Calcium Carbonate-Vitamin D (CALCIUM + D PO) Take 1 Tab by mouth every day.     • cetirizine (ZYRTEC) 10 MG TABS Take 10 mg by mouth 2 times a day.       No current facility-administered medications for this visit.          Current supplements as per medication list.     Allergies: Tape    Current social contact/activities:      She  reports that she quit smoking about 44 years ago. Her smoking use included cigarettes. She has a 3.50 pack-year smoking history. She has never used smokeless tobacco. She reports that she does not drink alcohol and does not use drugs.  Counseling given: Not Answered  Comment: avoid all tobacco products      DPA/Advanced Directive:  Patient has Advanced Directive on file.     ROS:    Gait: Uses no assistive device  Ostomy: No  Other tubes: No  Amputations: No  Chronic oxygen use:  No  Last eye exam: 6/2021  Wears hearing aids: No   : Denies any urinary leakage during the last 6 months    Screening:    Depression Screening  Little interest or pleasure in doing things?  0 - not at all  Feeling down, depressed , or hopeless? 0 - not at all  Patient Health Questionnaire Score: 0     If depressive symptoms identified deferred to follow up visit unless specifically addressed in assessment and plan.    Interpretation of PHQ-9 Total Score   Score Severity   1-4 No Depression   5-9 Mild Depression   10-14 Moderate Depression   15-19 Moderately Severe Depression   20-27 Severe Depression    Screening for Cognitive Impairment  Three Minute Recall (daughter, heaven, ricardo) 1/3    Reece clock face with all 12 numbers and set the hands to show 10 past 11.  Yes    Cognitive concerns identified deferred for follow up unless specifically addressed in assessment and plan.    Fall Risk Assessment  Has the patient had two or more falls in the last year or any fall with injury in the last year?  No    Safety Assessment  Throw rugs on floor.  Yes  Handrails on all stairs.  No  Good lighting in all hallways.  Yes  Difficulty hearing.  No  Patient counseled about all safety risks that were identified.    Functional Assessment ADLs  Are there any barriers preventing you from cooking for yourself or meeting nutritional needs?  No.    Are there any barriers preventing you from driving safely or obtaining transportation?  No.    Are there any barriers preventing you from using a telephone or calling for help?  No.    Are there any barriers preventing you from shopping?  No.    Are there any barriers preventing you from taking care of your own finances?  No.    Are there any barriers preventing you from managing your medications?  No.    Are there any barriers preventing you from showering, bathing or dressing yourself?  No.    Are you currently engaging in any exercise or physical activity?  Yes.  Walking dog 5  times a day   What is your perception of your health?  Good.      Health Maintenance Summary          Scheduled - MAMMOGRAM (Yearly) Scheduled for 7/1/2022 02/18/2021  MA-SCREENING MAMMO BILAT W/TOMOSYNTHESIS W/CAD    01/22/2020  MA-SCREENING MAMMO BILAT W/TOMOSYNTHESIS W/CAD    05/15/2018  MA-MAMMO SCREENING BILAT W/BERNICE W/CAD    06/17/2016  MA-SCREEN MAMMO W/CAD-BILAT    04/03/2015  MA-SCREENING MAMMOGRAM W/ CAD    Only the first 5 history entries have been loaded, but more history exists.          Overdue - COVID-19 Vaccine (4 - Booster for Moderna series) Overdue since 4/29/2022 12/29/2021  Outside Immunization: COVID-19 mRNA (MOD)    05/03/2021  Imm Admin: Moderna SARS-CoV-2 Vaccine    04/06/2021  Imm Admin: Moderna SARS-CoV-2 Vaccine          Annual Wellness Visit (Every 366 Days) Next due on 8/24/2022 08/23/2021  Visit Dx: Medicare annual wellness visit, subsequent    08/23/2021  Subsequent Annual Wellness Visit - Includes PPPS ()    12/11/2019  Visit Dx: Medicare annual wellness visit, initial    12/11/2019  Initial Annual Wellness Visit - Includes PPPS ()          IMM DTaP/Tdap/Td Vaccine (2 - Td or Tdap) Next due on 4/5/2023 04/05/2013  Imm Admin: Tdap Vaccine          COLORECTAL CANCER SCREENING (COLONOSCOPY - Every 10 Years) Next due on 8/17/2026 08/17/2016  REFERRAL TO GI FOR COLONOSCOPY          BONE DENSITY (Every 5 Years) Next due on 9/22/2026 09/22/2021  DS-BONE DENSITY STUDY (DEXA)    06/17/2016  DS-BONE DENSITY STUDY (DEXA)    06/17/2009  DS-BONE DENSITY STUDY (DEXA)          HEPATITIS C SCREENING  Completed    12/12/2019  HEP C VIRUS ANTIBODY          IMM ZOSTER VACCINES (Series Information) Completed    02/28/2020  Imm Admin: Zoster Vaccine Recombinant (RZV) (SHINGRIX)    12/27/2019  Imm Admin: Zoster Vaccine Recombinant (RZV) (SHINGRIX)    05/20/2016  Imm Admin: Zoster Vaccine Live (ZVL) (Zostavax) - HISTORICAL DATA          IMM PNEUMOCOCCAL VACCINE: 65+ Years  (Series Information) Completed    10/21/2020  Imm Admin: Pneumococcal polysaccharide vaccine (PPSV-23)    2018  Imm Admin: Pneumococcal Conjugate Vaccine (Prevnar/PCV-13)    2016  Imm Admin: Pneumococcal polysaccharide vaccine (PPSV-23)          IMM INFLUENZA (Series Information) Completed    2021  Outside Immunization: Influenza Quad Adjuvanted    10/21/2020  Imm Admin: Influenza Vaccine Adult HD    2019  Imm Admin: Influenza Vaccine Adult HD          IMM HEP B VACCINE (Series Information) Aged Out    No completion history exists for this topic.          IMM MENINGOCOCCAL VACCINE (MCV4) (Series Information) Aged Out    No completion history exists for this topic.          Discontinued - PAP SMEAR  Discontinued    2018  THINPREP PAP WITH HPV    2018  PATHOLOGY GYN SPECIMEN    10/03/2013  PAP IG, RFX HPV ASCU              Patient Care Team:  Dave Cheek M.D. as PCP - General (Geriatrics)        Social History     Tobacco Use   • Smoking status: Former Smoker     Packs/day: 0.50     Years: 7.00     Pack years: 3.50     Types: Cigarettes     Quit date: 1978     Years since quittin.5   • Smokeless tobacco: Never Used   • Tobacco comment: avoid all tobacco products   Vaping Use   • Vaping Use: Never used   Substance Use Topics   • Alcohol use: No     Alcohol/week: 0.0 oz   • Drug use: No     Family History   Problem Relation Age of Onset   • Cancer Mother         colon cancer twice, lung cancer   • Diabetes Mother    • Heart Disease Father         mi   • Diabetes Father    • Diabetes Other    • Heart Disease Other    • Cancer Other      She  has a past medical history of Allergic rhinitis (10/1/2009), Allergy, Infectious disease, Osteopenia, Pain, and Sigmoid diverticulosis (2016).   Past Surgical History:   Procedure Laterality Date   • ORIF, FRACTURE, TIBIA Left 2017    Procedure: TIBIA ORIF;  Surgeon: Casey Jewell M.D.;  Location: SURGERY [facility];  Service:    • TOE FUSION  2011    Performed by BARTOLOME ROBLES at SURGERY [facility]   • BONE GRAFT  2011    Performed by BARTOLOME ROBLES at SURGERY [facility]   • SHOULDER ARTHROSCOPY  2009    right; Performed by RICHARD ELIZONDO at SURGERY SAME DAY [facility]   • ROTATOR CUFF REPAIR  2009    Performed by RICHARD ELIZONDO at SURGERY SAME DAY [facility]   • MO  DELIVERY ONLY  1979       Exam:   There were no vitals taken for this visit. There is no height or weight on file to calculate BMI.    Hearing excellent.    Dentition good  Alert, oriented in no acute distress.  Eye contact is good, speech goal directed, affect calm    Assessment and Plan. The following treatment and monitoring plan is recommended:    71 y.o. female     1. Medicare annual wellness visit, subsequent  Preventive measures and chronic medical issues reviewed.    2. Mixed hyperlipidemia  Last lipid panel showed:  Component Ref Range & Units 9 mo ago 1 yr ago 2 yr ago 3 yr ago 5 yr ago 6 yr ago 9 yr ago   Cholesterol,Tot 100 - 199 mg/dL 222 High   213 High   192  186  192  183  165    Triglycerides 0 - 149 mg/dL 79  66  86  76  80  68  54    HDL >=40 mg/dL 69  67  52  54  56  54  53    LDL <100 mg/dL 137 High   133 High   123 High   117 High   120 High   115 High   101 High     Resulting Agency  M M            Patient is counseled on lifestyle modification  We will continue monitor lipid panel.    - Lipid Profile; Future    3. IGT (impaired glucose tolerance)  Blood glucose has been fluctuating lately has improved, becomes mostly normal.    - Comp Metabolic Panel; Future    4. Osteopenia of multiple sites  Last bone density in 2021 showed osteopenia.  No recent history of fracture  Continue on calcium and vitamin D    5. Leukopenia, unspecified type  Mild.  Asymptomatic  Continue to monitor CBC/white blood cells count    - CBC WITH DIFFERENTIAL; Future    6. Degenerative disc  disease, lumbar  Stable.  Currently asymptomatic.  Pain has been controlled with over-the-counter pain medication as needed    7. History of melanoma  Previous melanoma which was successfully treated. Will continue to follow annually with dermatology.          Services suggested: No services needed at this time  Health Care Screening: Age-appropriate preventive services recommended by USPTF and ACIP covered by Medicare were discussed today. Services ordered if indicated and agreed upon by the patient.  Referrals offered: Community-based lifestyle interventions to reduce health risks and promote self-management and wellness, fall prevention, nutrition, physical activity, tobacco-use cessation, weight loss, and mental health services as per orders if indicated.    Discussion today about general wellness and lifestyle habits:    · Prevent falls and reduce trip hazards; Cautioned about securing or removing rugs.  · Have a working fire alarm and carbon monoxide detector;   · Engage in regular physical activity and social activities     Follow-up: No follow-ups on file.

## 2022-07-01 ENCOUNTER — HOSPITAL ENCOUNTER (OUTPATIENT)
Dept: RADIOLOGY | Facility: MEDICAL CENTER | Age: 72
End: 2022-07-01
Attending: FAMILY MEDICINE
Payer: MEDICARE

## 2022-07-01 DIAGNOSIS — Z12.31 VISIT FOR SCREENING MAMMOGRAM: ICD-10-CM

## 2022-07-01 PROCEDURE — 77067 SCR MAMMO BI INCL CAD: CPT

## 2022-07-19 ENCOUNTER — HOSPITAL ENCOUNTER (OUTPATIENT)
Dept: LAB | Facility: MEDICAL CENTER | Age: 72
End: 2022-07-19
Attending: FAMILY MEDICINE
Payer: MEDICARE

## 2022-07-19 DIAGNOSIS — D72.819 LEUKOPENIA, UNSPECIFIED TYPE: ICD-10-CM

## 2022-07-19 DIAGNOSIS — E78.2 MIXED HYPERLIPIDEMIA: ICD-10-CM

## 2022-07-19 DIAGNOSIS — R73.02 IGT (IMPAIRED GLUCOSE TOLERANCE): ICD-10-CM

## 2022-07-19 LAB
ALBUMIN SERPL BCP-MCNC: 4.1 G/DL (ref 3.2–4.9)
ALBUMIN/GLOB SERPL: 1.8 G/DL
ALP SERPL-CCNC: 61 U/L (ref 30–99)
ALT SERPL-CCNC: 12 U/L (ref 2–50)
ANION GAP SERPL CALC-SCNC: 8 MMOL/L (ref 7–16)
AST SERPL-CCNC: 16 U/L (ref 12–45)
BASOPHILS # BLD AUTO: 1.3 % (ref 0–1.8)
BASOPHILS # BLD: 0.06 K/UL (ref 0–0.12)
BILIRUB SERPL-MCNC: 0.4 MG/DL (ref 0.1–1.5)
BUN SERPL-MCNC: 20 MG/DL (ref 8–22)
CALCIUM SERPL-MCNC: 9.5 MG/DL (ref 8.4–10.2)
CHLORIDE SERPL-SCNC: 107 MMOL/L (ref 96–112)
CHOLEST SERPL-MCNC: 198 MG/DL (ref 100–199)
CO2 SERPL-SCNC: 26 MMOL/L (ref 20–33)
CREAT SERPL-MCNC: 0.72 MG/DL (ref 0.5–1.4)
EOSINOPHIL # BLD AUTO: 0.28 K/UL (ref 0–0.51)
EOSINOPHIL NFR BLD: 6 % (ref 0–6.9)
ERYTHROCYTE [DISTWIDTH] IN BLOOD BY AUTOMATED COUNT: 43.8 FL (ref 35.9–50)
FASTING STATUS PATIENT QL REPORTED: NORMAL
GFR SERPLBLD CREATININE-BSD FMLA CKD-EPI: 90 ML/MIN/1.73 M 2
GLOBULIN SER CALC-MCNC: 2.3 G/DL (ref 1.9–3.5)
GLUCOSE SERPL-MCNC: 86 MG/DL (ref 65–99)
HCT VFR BLD AUTO: 45.2 % (ref 37–47)
HDLC SERPL-MCNC: 60 MG/DL
HGB BLD-MCNC: 15 G/DL (ref 12–16)
IMM GRANULOCYTES # BLD AUTO: 0.02 K/UL (ref 0–0.11)
IMM GRANULOCYTES NFR BLD AUTO: 0.4 % (ref 0–0.9)
LDLC SERPL CALC-MCNC: 110 MG/DL
LYMPHOCYTES # BLD AUTO: 1.52 K/UL (ref 1–4.8)
LYMPHOCYTES NFR BLD: 32.5 % (ref 22–41)
MCH RBC QN AUTO: 31.4 PG (ref 27–33)
MCHC RBC AUTO-ENTMCNC: 33.2 G/DL (ref 33.6–35)
MCV RBC AUTO: 94.6 FL (ref 81.4–97.8)
MONOCYTES # BLD AUTO: 0.37 K/UL (ref 0–0.85)
MONOCYTES NFR BLD AUTO: 7.9 % (ref 0–13.4)
NEUTROPHILS # BLD AUTO: 2.42 K/UL (ref 2–7.15)
NEUTROPHILS NFR BLD: 51.9 % (ref 44–72)
NRBC # BLD AUTO: 0 K/UL
NRBC BLD-RTO: 0 /100 WBC
PLATELET # BLD AUTO: 217 K/UL (ref 164–446)
PMV BLD AUTO: 10.3 FL (ref 9–12.9)
POTASSIUM SERPL-SCNC: 4.6 MMOL/L (ref 3.6–5.5)
PROT SERPL-MCNC: 6.4 G/DL (ref 6–8.2)
RBC # BLD AUTO: 4.78 M/UL (ref 4.2–5.4)
SODIUM SERPL-SCNC: 141 MMOL/L (ref 135–145)
TRIGL SERPL-MCNC: 138 MG/DL (ref 0–149)
WBC # BLD AUTO: 4.7 K/UL (ref 4.8–10.8)

## 2022-07-19 PROCEDURE — 80061 LIPID PANEL: CPT

## 2022-07-19 PROCEDURE — 80053 COMPREHEN METABOLIC PANEL: CPT

## 2022-07-19 PROCEDURE — 36415 COLL VENOUS BLD VENIPUNCTURE: CPT

## 2022-07-19 PROCEDURE — 85025 COMPLETE CBC W/AUTO DIFF WBC: CPT

## 2022-11-09 ENCOUNTER — PATIENT MESSAGE (OUTPATIENT)
Dept: HEALTH INFORMATION MANAGEMENT | Facility: OTHER | Age: 72
End: 2022-11-09

## 2023-02-15 ENCOUNTER — APPOINTMENT (RX ONLY)
Dept: URBAN - METROPOLITAN AREA CLINIC 6 | Facility: CLINIC | Age: 73
Setting detail: DERMATOLOGY
End: 2023-02-15

## 2023-02-15 DIAGNOSIS — Z85.820 PERSONAL HISTORY OF MALIGNANT MELANOMA OF SKIN: ICD-10-CM | Status: STABLE

## 2023-02-15 DIAGNOSIS — D22 MELANOCYTIC NEVI: ICD-10-CM

## 2023-02-15 DIAGNOSIS — Z71.89 OTHER SPECIFIED COUNSELING: ICD-10-CM

## 2023-02-15 DIAGNOSIS — L72.8 OTHER FOLLICULAR CYSTS OF THE SKIN AND SUBCUTANEOUS TISSUE: ICD-10-CM

## 2023-02-15 DIAGNOSIS — Z00.8 ENCOUNTER FOR OTHER GENERAL EXAMINATION: ICD-10-CM

## 2023-02-15 DIAGNOSIS — L81.4 OTHER MELANIN HYPERPIGMENTATION: ICD-10-CM

## 2023-02-15 DIAGNOSIS — L82.1 OTHER SEBORRHEIC KERATOSIS: ICD-10-CM

## 2023-02-15 DIAGNOSIS — D18.0 HEMANGIOMA: ICD-10-CM

## 2023-02-15 PROBLEM — D18.01 HEMANGIOMA OF SKIN AND SUBCUTANEOUS TISSUE: Status: ACTIVE | Noted: 2023-02-15

## 2023-02-15 PROBLEM — D22.5 MELANOCYTIC NEVI OF TRUNK: Status: ACTIVE | Noted: 2023-02-15

## 2023-02-15 PROCEDURE — 99213 OFFICE O/P EST LOW 20 MIN: CPT

## 2023-02-15 PROCEDURE — ? SUNSCREEN TREATMENT REGIMEN

## 2023-02-15 PROCEDURE — ? COUNSELING

## 2023-02-15 PROCEDURE — ? REFERRAL CORRESPONDENCE

## 2023-02-15 ASSESSMENT — LOCATION SIMPLE DESCRIPTION DERM
LOCATION SIMPLE: UPPER BACK
LOCATION SIMPLE: LEFT FOREARM
LOCATION SIMPLE: CHEST
LOCATION SIMPLE: ABDOMEN
LOCATION SIMPLE: LEFT FOREHEAD
LOCATION SIMPLE: GROIN
LOCATION SIMPLE: LEFT HAND
LOCATION SIMPLE: RIGHT HAND

## 2023-02-15 ASSESSMENT — LOCATION ZONE DERM
LOCATION ZONE: HAND
LOCATION ZONE: TRUNK
LOCATION ZONE: ARM
LOCATION ZONE: FACE

## 2023-02-15 ASSESSMENT — LOCATION DETAILED DESCRIPTION DERM
LOCATION DETAILED: MIDDLE STERNUM
LOCATION DETAILED: SUPERIOR THORACIC SPINE
LOCATION DETAILED: LEFT INFERIOR FOREHEAD
LOCATION DETAILED: LEFT PROXIMAL DORSAL FOREARM
LOCATION DETAILED: EPIGASTRIC SKIN
LOCATION DETAILED: RIGHT RADIAL DORSAL HAND
LOCATION DETAILED: PERIUMBILICAL SKIN
LOCATION DETAILED: LEFT RADIAL DORSAL HAND
LOCATION DETAILED: RIGHT SUPRAPUBIC SKIN

## 2023-02-15 NOTE — PROCEDURE: MIPS QUALITY
Quality 397: Melanoma: Reporting: Pathology report includes the pT Category, thickness, ulceration and mitotic rate, peripheral and deep margin status and presence or absence of microsatellitosis for invasive tumors.
Quality 265: Biopsy Follow-Up: Biopsy results reviewed, communicated, tracked, and documented
Quality 226: Preventive Care And Screening: Tobacco Use: Screening And Cessation Intervention: Patient screened for tobacco use and is an ex/non-smoker
Quality 137: Melanoma: Continuity Of Care - Recall System: Patient information entered into a recall system that includes: target date for the next exam specified AND a process to follow up with patients regarding missed or unscheduled appointments
Quality 130: Documentation Of Current Medications In The Medical Record: Current Medications Documented
Quality 138: Melanoma: Coordination Of Care: A treatment plan was communicated to the physicians providing continuing care within one month of diagnosis outlining: diagnosis, tumor thickness and a plan for surgery or alternate care.
Detail Level: Detailed
Quality 111:Pneumonia Vaccination Status For Older Adults: Pneumococcal vaccine (PPSV23) administered on or after patient’s 60th birthday and before the end of the measurement period

## 2023-04-11 ENCOUNTER — OFFICE VISIT (OUTPATIENT)
Dept: MEDICAL GROUP | Facility: MEDICAL CENTER | Age: 73
End: 2023-04-11
Payer: MEDICARE

## 2023-04-11 VITALS
HEIGHT: 64 IN | DIASTOLIC BLOOD PRESSURE: 72 MMHG | TEMPERATURE: 97.8 F | SYSTOLIC BLOOD PRESSURE: 120 MMHG | OXYGEN SATURATION: 96 % | WEIGHT: 172.4 LBS | BODY MASS INDEX: 29.43 KG/M2 | HEART RATE: 65 BPM

## 2023-04-11 DIAGNOSIS — Z00.00 MEDICARE ANNUAL WELLNESS VISIT, SUBSEQUENT: ICD-10-CM

## 2023-04-11 DIAGNOSIS — Z85.820 HISTORY OF MELANOMA: ICD-10-CM

## 2023-04-11 DIAGNOSIS — D72.819 LEUKOPENIA, UNSPECIFIED TYPE: ICD-10-CM

## 2023-04-11 DIAGNOSIS — R41.3 MEMORY PROBLEM: ICD-10-CM

## 2023-04-11 DIAGNOSIS — J30.9 CHRONIC ALLERGIC RHINITIS: ICD-10-CM

## 2023-04-11 DIAGNOSIS — M85.89 OSTEOPENIA OF MULTIPLE SITES: ICD-10-CM

## 2023-04-11 DIAGNOSIS — E78.2 MIXED HYPERLIPIDEMIA: ICD-10-CM

## 2023-04-11 PROCEDURE — G0439 PPPS, SUBSEQ VISIT: HCPCS | Performed by: FAMILY MEDICINE

## 2023-04-11 SDOH — HEALTH STABILITY: MENTAL HEALTH
STRESS IS WHEN SOMEONE FEELS TENSE, NERVOUS, ANXIOUS, OR CAN'T SLEEP AT NIGHT BECAUSE THEIR MIND IS TROUBLED. HOW STRESSED ARE YOU?: TO SOME EXTENT

## 2023-04-11 SDOH — ECONOMIC STABILITY: FOOD INSECURITY: WITHIN THE PAST 12 MONTHS, YOU WORRIED THAT YOUR FOOD WOULD RUN OUT BEFORE YOU GOT MONEY TO BUY MORE.: SOMETIMES TRUE

## 2023-04-11 SDOH — ECONOMIC STABILITY: INCOME INSECURITY: IN THE LAST 12 MONTHS, WAS THERE A TIME WHEN YOU WERE NOT ABLE TO PAY THE MORTGAGE OR RENT ON TIME?: NO

## 2023-04-11 SDOH — ECONOMIC STABILITY: TRANSPORTATION INSECURITY
IN THE PAST 12 MONTHS, HAS THE LACK OF TRANSPORTATION KEPT YOU FROM MEDICAL APPOINTMENTS OR FROM GETTING MEDICATIONS?: NO

## 2023-04-11 SDOH — ECONOMIC STABILITY: HOUSING INSECURITY
IN THE LAST 12 MONTHS, WAS THERE A TIME WHEN YOU DID NOT HAVE A STEADY PLACE TO SLEEP OR SLEPT IN A SHELTER (INCLUDING NOW)?: NO

## 2023-04-11 SDOH — ECONOMIC STABILITY: FOOD INSECURITY: WITHIN THE PAST 12 MONTHS, THE FOOD YOU BOUGHT JUST DIDN'T LAST AND YOU DIDN'T HAVE MONEY TO GET MORE.: NEVER TRUE

## 2023-04-11 SDOH — ECONOMIC STABILITY: HOUSING INSECURITY: IN THE LAST 12 MONTHS, HOW MANY PLACES HAVE YOU LIVED?: 1

## 2023-04-11 SDOH — ECONOMIC STABILITY: TRANSPORTATION INSECURITY
IN THE PAST 12 MONTHS, HAS LACK OF RELIABLE TRANSPORTATION KEPT YOU FROM MEDICAL APPOINTMENTS, MEETINGS, WORK OR FROM GETTING THINGS NEEDED FOR DAILY LIVING?: NO

## 2023-04-11 SDOH — ECONOMIC STABILITY: INCOME INSECURITY: HOW HARD IS IT FOR YOU TO PAY FOR THE VERY BASICS LIKE FOOD, HOUSING, MEDICAL CARE, AND HEATING?: NOT VERY HARD

## 2023-04-11 SDOH — HEALTH STABILITY: PHYSICAL HEALTH: ON AVERAGE, HOW MANY MINUTES DO YOU ENGAGE IN EXERCISE AT THIS LEVEL?: 10 MIN

## 2023-04-11 SDOH — ECONOMIC STABILITY: TRANSPORTATION INSECURITY
IN THE PAST 12 MONTHS, HAS LACK OF TRANSPORTATION KEPT YOU FROM MEETINGS, WORK, OR FROM GETTING THINGS NEEDED FOR DAILY LIVING?: NO

## 2023-04-11 SDOH — HEALTH STABILITY: PHYSICAL HEALTH: ON AVERAGE, HOW MANY DAYS PER WEEK DO YOU ENGAGE IN MODERATE TO STRENUOUS EXERCISE (LIKE A BRISK WALK)?: 6 DAYS

## 2023-04-11 ASSESSMENT — SOCIAL DETERMINANTS OF HEALTH (SDOH)
HOW OFTEN DO YOU HAVE A DRINK CONTAINING ALCOHOL: NEVER
HOW OFTEN DO YOU GET TOGETHER WITH FRIENDS OR RELATIVES?: ONCE A WEEK
HOW OFTEN DO YOU GET TOGETHER WITH FRIENDS OR RELATIVES?: ONCE A WEEK
HOW OFTEN DO YOU ATTEND CHURCH OR RELIGIOUS SERVICES?: NEVER
DO YOU BELONG TO ANY CLUBS OR ORGANIZATIONS SUCH AS CHURCH GROUPS UNIONS, FRATERNAL OR ATHLETIC GROUPS, OR SCHOOL GROUPS?: NO
WITHIN THE PAST 12 MONTHS, YOU WORRIED THAT YOUR FOOD WOULD RUN OUT BEFORE YOU GOT THE MONEY TO BUY MORE: SOMETIMES TRUE
HOW OFTEN DO YOU HAVE SIX OR MORE DRINKS ON ONE OCCASION: NEVER
HOW OFTEN DO YOU ATTENT MEETINGS OF THE CLUB OR ORGANIZATION YOU BELONG TO?: NEVER
HOW HARD IS IT FOR YOU TO PAY FOR THE VERY BASICS LIKE FOOD, HOUSING, MEDICAL CARE, AND HEATING?: NOT VERY HARD
DO YOU BELONG TO ANY CLUBS OR ORGANIZATIONS SUCH AS CHURCH GROUPS UNIONS, FRATERNAL OR ATHLETIC GROUPS, OR SCHOOL GROUPS?: NO
IN A TYPICAL WEEK, HOW MANY TIMES DO YOU TALK ON THE PHONE WITH FAMILY, FRIENDS, OR NEIGHBORS?: TWICE A WEEK
IN A TYPICAL WEEK, HOW MANY TIMES DO YOU TALK ON THE PHONE WITH FAMILY, FRIENDS, OR NEIGHBORS?: TWICE A WEEK
HOW OFTEN DO YOU ATTENT MEETINGS OF THE CLUB OR ORGANIZATION YOU BELONG TO?: NEVER
HOW OFTEN DO YOU ATTEND CHURCH OR RELIGIOUS SERVICES?: NEVER
HOW MANY DRINKS CONTAINING ALCOHOL DO YOU HAVE ON A TYPICAL DAY WHEN YOU ARE DRINKING: PATIENT DOES NOT DRINK

## 2023-04-11 ASSESSMENT — LIFESTYLE VARIABLES
HOW MANY STANDARD DRINKS CONTAINING ALCOHOL DO YOU HAVE ON A TYPICAL DAY: PATIENT DOES NOT DRINK
SKIP TO QUESTIONS 9-10: 1
HOW OFTEN DO YOU HAVE SIX OR MORE DRINKS ON ONE OCCASION: NEVER
AUDIT-C TOTAL SCORE: 0
HOW OFTEN DO YOU HAVE A DRINK CONTAINING ALCOHOL: NEVER

## 2023-04-11 ASSESSMENT — FIBROSIS 4 INDEX: FIB4 SCORE: 1.51

## 2023-04-11 ASSESSMENT — PATIENT HEALTH QUESTIONNAIRE - PHQ9: CLINICAL INTERPRETATION OF PHQ2 SCORE: 0

## 2023-04-11 NOTE — PROGRESS NOTES
Chief Complaint   Patient presents with    Memory Loss     Pt states she has noticed her memory worsening. Pt states this has happened for the last 6 months.        HPI:  Derek Guzman is a 72 y.o. here for Medicare Annual Wellness Visit     Patient Active Problem List    Diagnosis Date Noted    Elevated LDL cholesterol level 07/23/2018    Prediabetes 07/23/2018    Osteopenia of multiple sites 07/31/2017    Obesity (BMI 30-39.9) 07/31/2017    Primary osteoarthritis of both hands 07/31/2017    Sigmoid diverticulosis 08/17/2016    Degenerative disc disease, lumbar 09/29/2014    History of melanoma 10/10/2013    History of colonoscopy with polypectomy 06/02/2010    Non-seasonal allergic rhinitis 10/01/2009       Current Outpatient Medications   Medication Sig Dispense Refill    Multiple Vitamin (MULTI-VITAMIN PO) Take  by mouth.      Ascorbic Acid (VITAMIN C PO) Take  by mouth.      diphenhydrAMINE (BENADRYL) 25 MG Tab Take 25 mg by mouth at bedtime as needed for Sleep.      Calcium Carbonate-Vitamin D (CALCIUM + D PO) Take 1 Tab by mouth every day.      meloxicam (MOBIC) 7.5 MG Tab Take 1 Tablet by mouth every day. 30 Tablet 6    azelastine (ASTELIN) 137 MCG/SPRAY nasal spray [place] 1 Spray in nose 2 times a day. 30 mL 3    cetirizine (ZYRTEC) 10 MG TABS Take 10 mg by mouth 2 times a day.       No current facility-administered medications for this visit.          Current supplements as per medication list.     Allergies: Tape and Trace metals    Current social contact/activities:     She  reports that she quit smoking about 45 years ago. Her smoking use included cigarettes. She has a 3.50 pack-year smoking history. She has never used smokeless tobacco. She reports that she does not drink alcohol and does not use drugs.  Counseling given: Not Answered  Tobacco comments: avoid all tobacco products      ROS:    Gait: no assistive device  Ostomy: No  Other tubes: No  Amputations: No  Chronic oxygen use: No  Last eye  exam: not sure  Wears hearing aids: No  : No leak    Screening:    Depression Screening  Little interest or pleasure in doing things?  0 - not at all  Feeling down, depressed , or hopeless? 0 - not at all  Patient Health Questionnaire Score: 0     If depressive symptoms identified deferred to follow up visit unless specifically addressed in assessment and plan.    Interpretation of PHQ-9 Total Score   Score Severity   1-4 No Depression   5-9 Mild Depression   10-14 Moderate Depression   15-19 Moderately Severe Depression   20-27 Severe Depression    Screening for Cognitive Impairment  Three Minute Recall (daughter, heaven, mountain)  0/3    Reece clock face with all 12 numbers and set the hands to show 10 past 11.    unable   Cognitive concerns identified deferred for follow up unless specifically addressed in assessment and plan.    Fall Risk Assessment  Has the patient had two or more falls in the last year or any fall with injury in the last year?  No    Safety Assessment  Throw rugs on floor.   no  Handrails on all stairs.   Yes  Good lighting in all hallways.   Yes  Difficulty hearing.   No  Patient counseled about all safety risks that were identified.    Functional Assessment ADLs  Are there any barriers preventing you from cooking for yourself or meeting nutritional needs?   .  No    Are there any barriers preventing you from driving safely or obtaining transportation?   .  No   Are there any barriers preventing you from using a telephone or calling for help?   .  No    Are there any barriers preventing you from shopping?   .  No    Are there any barriers preventing you from taking care of your own finances?   .  No   Are there any barriers preventing you from managing your medications?   .  No    Are there any barriers preventing you from showering, bathing or dressing yourself?   .  No    Are you currently engaging in any exercise or physical activity?   .  No     What is your perception of your health?    Fair    Advance Care Planning  Do you have an Advance Directive, Living Will, Durable Power of , or POLST?                   Health Maintenance Summary            Overdue - COVID-19 Vaccine (4 - Booster for Moderna series) Overdue since 2/23/2022 12/29/2021  Imm Admin: MODERNA SARS-COV-2 VACCINE (12+)    05/03/2021  Imm Admin: MODERNA SARS-COV-2 VACCINE (12+)    04/06/2021  Imm Admin: MODERNA SARS-COV-2 VACCINE (12+)              Overdue - IMM DTaP/Tdap/Td Vaccine (2 - Td or Tdap) Overdue since 4/5/2023 04/05/2013  Imm Admin: Tdap Vaccine              IMM INFLUENZA (Season Ended) Next due on 9/1/2023 12/29/2021  Imm Admin: Influenza, Unspecified - HISTORICAL DATA    10/21/2020  Imm Admin: Influenza Vaccine Adult HD    12/11/2019  Imm Admin: Influenza Vaccine Adult HD              MAMMOGRAM (Every 2 Years) Next due on 7/1/2024 07/01/2022  MA-SCREENING MAMMO BILAT W/TOMOSYNTHESIS W/CAD    02/18/2021  MA-SCREENING MAMMO BILAT W/TOMOSYNTHESIS W/CAD    01/22/2020  MA-SCREENING MAMMO BILAT W/TOMOSYNTHESIS W/CAD    05/15/2018  MA-MAMMO SCREENING BILAT W/BERNICE W/CAD    06/17/2016  MA-SCREEN MAMMO W/CAD-BILAT    Only the first 5 history entries have been loaded, but more history exists.              COLORECTAL CANCER SCREENING (COLONOSCOPY - Every 10 Years) Next due on 8/17/2026 08/17/2016  REFERRAL TO GI FOR COLONOSCOPY              BONE DENSITY (Every 5 Years) Next due on 9/22/2026 09/22/2021  DS-BONE DENSITY STUDY (DEXA)    06/17/2016  DS-BONE DENSITY STUDY (DEXA)    06/17/2009  DS-BONE DENSITY STUDY (DEXA)              HEPATITIS C SCREENING  Completed      12/12/2019  HEP C VIRUS ANTIBODY              IMM ZOSTER VACCINES (Series Information) Completed      02/28/2020  Imm Admin: Zoster Vaccine Recombinant (RZV) (SHINGRIX)    12/27/2019  Imm Admin: Zoster Vaccine Recombinant (RZV) (SHINGRIX)    05/20/2016  Imm Admin: Zoster Vaccine Live (ZVL) (Zostavax) - HISTORICAL DATA               IMM PNEUMOCOCCAL VACCINE: 65+ Years (Series Information) Completed      10/21/2020  Imm Admin: Pneumococcal polysaccharide vaccine (PPSV-23)    2018  Imm Admin: Pneumococcal Conjugate Vaccine (Prevnar/PCV-13)    2016  Imm Admin: Pneumococcal polysaccharide vaccine (PPSV-23)              IMM HEP B VACCINE (Series Information) Aged Out      No completion history exists for this topic.              IMM MENINGOCOCCAL ACWY VACCINE (Series Information) Aged Out      No completion history exists for this topic.              Discontinued - CERVICAL CANCER SCREENING  Discontinued        Frequency changed to Never automatically (Topic No Longer Applies)    2018  THINPREP PAP WITH HPV    2018  PATHOLOGY GYN SPECIMEN    10/03/2013  PAP IG, RFX HPV ASCU                    Patient Care Team:  Dave Cheek M.D. as PCP - General (Geriatrics)        Social History     Tobacco Use    Smoking status: Former     Packs/day: 0.50     Years: 7.00     Pack years: 3.50     Types: Cigarettes     Quit date: 1978     Years since quittin.3    Smokeless tobacco: Never    Tobacco comments:     avoid all tobacco products   Vaping Use    Vaping Use: Never used   Substance Use Topics    Alcohol use: No     Alcohol/week: 0.0 oz    Drug use: No     Family History   Problem Relation Age of Onset    Cancer Mother         colon cancer twice, lung cancer    Diabetes Mother     Heart Disease Father         mi    Diabetes Father     Diabetes Other     Heart Disease Other     Cancer Other      She  has a past medical history of Allergic rhinitis (10/1/2009), Allergy, Infectious disease, Osteopenia, Pain, and Sigmoid diverticulosis (2016).   Past Surgical History:   Procedure Laterality Date    ORIF, FRACTURE, TIBIA Left 2017    Procedure: TIBIA ORIF;  Surgeon: Casey Jewell M.D.;  Location: [facility];  Service:     TOE FUSION  2011    Performed by BARTOLOME ROBLES at [facility]  [facility]    BONE GRAFT  2011    Performed by BARTOLOME ROBLES at SURGERY [facility]    SHOULDER ARTHROSCOPY  2009    right; Performed by RICHARD ELIZONDO at SURGERY SAME DAY [facility]    ROTATOR CUFF REPAIR  2009    Performed by RICHARD ELIZONDO at SURGERY SAME DAY [facility]    [place]  DELIVERY ONLY         Exam:   /72 (BP Location: Left arm, Patient Position: Sitting, BP Cuff Size: Small adult)   Pulse 65   Temp 36.6 °C (97.8 °F) (Temporal)   Ht 1.626 m (5' 4\")   Wt 78.2 kg (172 lb 6.4 oz)   SpO2 96%  Body mass index is 29.59 kg/m².    Hearing: Normal.    Dentition: Good  Alert, oriented in no acute distress.  Eye contact is good, speech goal directed, affect calm    Assessment and Plan. The following treatment and monitoring plan is recommended:    72 y.o. female     1. Medicare annual wellness visit, subsequent  Preventive measures and chronic medical issues reviewed.    - Subsequent Annual Wellness Visit - Includes PPPS ()    2. Mixed hyperlipidemia  ASCVD risk score is 9.2%  Most recent lipid panel:  Cholesterol,Tot 100 - 199 mg/dL 198  222 High   213 High   192  186  192  183    Triglycerides 0 - 149 mg/dL 138  79  66  86  76  80  68    HDL >=40 mg/dL 60  69  67  52  54  56  54    LDL <100 mg/dL 110 High   137 High   133 High   123 High        Reluctant to take cholesterol-lowering medication.  We will continue monitor lipid panel    - Subsequent Annual Wellness Visit - Includes PPPS ()    3. Leukopenia, unspecified type  Mild.  Asymptomatic  We will continue monitor CBC    - Subsequent Annual Wellness Visit - Includes PPPS ()    4. Memory problem  She has been having a problem with short-term memory.  She could not remember any of the 3 words in 5 minutes..  Patient advised to return to the clinic for a comprehensive memory exam    - Subsequent Annual Wellness Visit - Includes PPPS ()    5. Osteopenia of multiple sites  No recent fractures. "  Continue on calcium vitamin D    - Subsequent Annual Wellness Visit - Includes PPPS ()    6. History of melanoma  No recurrence.  Currently asymptomatic    - Subsequent Annual Wellness Visit - Includes PPPS ()    7. Chronic allergic rhinitis  Currently asymptomatic  Continue on azelastine nasal spray as needed    - Subsequent Annual Wellness Visit - Includes PPPS ()        Services suggested:   Health Care Screening: Age-appropriate preventive services recommended by USPTF and ACIP covered by Medicare were discussed today. Services ordered if indicated and agreed upon by the patient.  Referrals offered: Community-based lifestyle interventions to reduce health risks and promote self-management and wellness, fall prevention, nutrition, physical activity, tobacco-use cessation, weight loss, and mental health services as per orders if indicated.    Discussion today about general wellness and lifestyle habits:    Prevent falls and reduce trip hazards; Cautioned about securing or removing rugs.  Have a working fire alarm and carbon monoxide detector;   Engage in regular physical activity and social activities     Follow-up: No follow-ups on file.

## 2023-04-20 ENCOUNTER — HOSPITAL ENCOUNTER (OUTPATIENT)
Dept: LAB | Facility: MEDICAL CENTER | Age: 73
End: 2023-04-20
Attending: FAMILY MEDICINE
Payer: MEDICARE

## 2023-04-20 DIAGNOSIS — R73.02 IGT (IMPAIRED GLUCOSE TOLERANCE): ICD-10-CM

## 2023-04-20 DIAGNOSIS — E78.2 MIXED HYPERLIPIDEMIA: ICD-10-CM

## 2023-04-20 DIAGNOSIS — D72.819 LEUKOPENIA, UNSPECIFIED TYPE: ICD-10-CM

## 2023-04-20 LAB
ALBUMIN SERPL BCP-MCNC: 3.9 G/DL (ref 3.2–4.9)
ALBUMIN/GLOB SERPL: 1.4 G/DL
ALP SERPL-CCNC: 80 U/L (ref 30–99)
ALT SERPL-CCNC: 14 U/L (ref 2–50)
ANION GAP SERPL CALC-SCNC: 11 MMOL/L (ref 7–16)
AST SERPL-CCNC: 18 U/L (ref 12–45)
BASOPHILS # BLD AUTO: 0.5 % (ref 0–1.8)
BASOPHILS # BLD: 0.03 K/UL (ref 0–0.12)
BILIRUB SERPL-MCNC: 0.4 MG/DL (ref 0.1–1.5)
BUN SERPL-MCNC: 15 MG/DL (ref 8–22)
CALCIUM ALBUM COR SERPL-MCNC: 9.7 MG/DL (ref 8.5–10.5)
CALCIUM SERPL-MCNC: 9.6 MG/DL (ref 8.5–10.5)
CHLORIDE SERPL-SCNC: 108 MMOL/L (ref 96–112)
CHOLEST SERPL-MCNC: 200 MG/DL (ref 100–199)
CO2 SERPL-SCNC: 24 MMOL/L (ref 20–33)
CREAT SERPL-MCNC: 0.62 MG/DL (ref 0.5–1.4)
EOSINOPHIL # BLD AUTO: 0.17 K/UL (ref 0–0.51)
EOSINOPHIL NFR BLD: 3 % (ref 0–6.9)
ERYTHROCYTE [DISTWIDTH] IN BLOOD BY AUTOMATED COUNT: 43.1 FL (ref 35.9–50)
EST. AVERAGE GLUCOSE BLD GHB EST-MCNC: 111 MG/DL
GFR SERPLBLD CREATININE-BSD FMLA CKD-EPI: 95 ML/MIN/1.73 M 2
GLOBULIN SER CALC-MCNC: 2.8 G/DL (ref 1.9–3.5)
GLUCOSE SERPL-MCNC: 74 MG/DL (ref 65–99)
HBA1C MFR BLD: 5.5 % (ref 4–5.6)
HCT VFR BLD AUTO: 43.5 % (ref 37–47)
HDLC SERPL-MCNC: 54 MG/DL
HGB BLD-MCNC: 14.4 G/DL (ref 12–16)
IMM GRANULOCYTES # BLD AUTO: 0.01 K/UL (ref 0–0.11)
IMM GRANULOCYTES NFR BLD AUTO: 0.2 % (ref 0–0.9)
LDLC SERPL CALC-MCNC: 131 MG/DL
LYMPHOCYTES # BLD AUTO: 1.67 K/UL (ref 1–4.8)
LYMPHOCYTES NFR BLD: 29.9 % (ref 22–41)
MCH RBC QN AUTO: 30.6 PG (ref 27–33)
MCHC RBC AUTO-ENTMCNC: 33.1 G/DL (ref 33.6–35)
MCV RBC AUTO: 92.6 FL (ref 81.4–97.8)
MONOCYTES # BLD AUTO: 0.39 K/UL (ref 0–0.85)
MONOCYTES NFR BLD AUTO: 7 % (ref 0–13.4)
NEUTROPHILS # BLD AUTO: 3.31 K/UL (ref 2–7.15)
NEUTROPHILS NFR BLD: 59.4 % (ref 44–72)
NRBC # BLD AUTO: 0 K/UL
NRBC BLD-RTO: 0 /100 WBC
PLATELET # BLD AUTO: 251 K/UL (ref 164–446)
PMV BLD AUTO: 10.6 FL (ref 9–12.9)
POTASSIUM SERPL-SCNC: 4.3 MMOL/L (ref 3.6–5.5)
PROT SERPL-MCNC: 6.7 G/DL (ref 6–8.2)
RBC # BLD AUTO: 4.7 M/UL (ref 4.2–5.4)
SODIUM SERPL-SCNC: 143 MMOL/L (ref 135–145)
TRIGL SERPL-MCNC: 73 MG/DL (ref 0–149)
TSH SERPL DL<=0.005 MIU/L-ACNC: 0.72 UIU/ML (ref 0.38–5.33)
WBC # BLD AUTO: 5.6 K/UL (ref 4.8–10.8)

## 2023-04-20 PROCEDURE — 80053 COMPREHEN METABOLIC PANEL: CPT

## 2023-04-20 PROCEDURE — 80061 LIPID PANEL: CPT

## 2023-04-20 PROCEDURE — 83036 HEMOGLOBIN GLYCOSYLATED A1C: CPT | Mod: GA

## 2023-04-20 PROCEDURE — 36415 COLL VENOUS BLD VENIPUNCTURE: CPT

## 2023-04-20 PROCEDURE — 84443 ASSAY THYROID STIM HORMONE: CPT

## 2023-04-20 PROCEDURE — 85025 COMPLETE CBC W/AUTO DIFF WBC: CPT

## 2023-05-15 ENCOUNTER — TELEPHONE (OUTPATIENT)
Dept: NEUROLOGY | Facility: MEDICAL CENTER | Age: 73
End: 2023-05-15
Payer: MEDICARE

## 2023-05-23 ENCOUNTER — OFFICE VISIT (OUTPATIENT)
Dept: NEUROLOGY | Facility: MEDICAL CENTER | Age: 73
End: 2023-05-23
Attending: PSYCHIATRY & NEUROLOGY
Payer: MEDICARE

## 2023-05-23 VITALS
OXYGEN SATURATION: 95 % | DIASTOLIC BLOOD PRESSURE: 82 MMHG | TEMPERATURE: 97.9 F | HEIGHT: 64 IN | HEART RATE: 64 BPM | BODY MASS INDEX: 28.38 KG/M2 | WEIGHT: 166.23 LBS | SYSTOLIC BLOOD PRESSURE: 122 MMHG

## 2023-05-23 DIAGNOSIS — G30.1 MILD LATE ONSET ALZHEIMER'S DEMENTIA WITHOUT BEHAVIORAL DISTURBANCE, PSYCHOTIC DISTURBANCE, MOOD DISTURBANCE, OR ANXIETY (HCC): Primary | ICD-10-CM

## 2023-05-23 DIAGNOSIS — F02.A0 MILD LATE ONSET ALZHEIMER'S DEMENTIA WITHOUT BEHAVIORAL DISTURBANCE, PSYCHOTIC DISTURBANCE, MOOD DISTURBANCE, OR ANXIETY (HCC): Primary | ICD-10-CM

## 2023-05-23 DIAGNOSIS — E78.2 MIXED HYPERLIPIDEMIA: ICD-10-CM

## 2023-05-23 PROCEDURE — 99204 OFFICE O/P NEW MOD 45 MIN: CPT | Performed by: PSYCHIATRY & NEUROLOGY

## 2023-05-23 PROCEDURE — 3074F SYST BP LT 130 MM HG: CPT | Performed by: PSYCHIATRY & NEUROLOGY

## 2023-05-23 PROCEDURE — 3079F DIAST BP 80-89 MM HG: CPT | Performed by: PSYCHIATRY & NEUROLOGY

## 2023-05-23 PROCEDURE — 99211 OFF/OP EST MAY X REQ PHY/QHP: CPT | Performed by: PSYCHIATRY & NEUROLOGY

## 2023-05-23 ASSESSMENT — MONTREAL COGNITIVE ASSESSMENT (MOCA)
WHAT IS THE VERSION OF MOCA ADMINISTERED: 7.3
DELAYED RECALL SUBSCORE: 2/5
5. MEMORY TRIALS: SECOND TRIAL
ORIENTATION SUBSCORE: 6/6
6. READ LIST OF DIGITS [FORWARD/BACKWARD]: 2/2
2. COPY DRAWING: 0/1
3. DRAW A CLOCK: CONTOUR, NUMBERS, HANDS: 1/3
10. [FLUENCY] NAME WORDS STARTING WITH DESIGNATED LETTER: 0/1
9. REPEAT EACH SENTENCE: 1/2
7. [VIGILENCE] TAP WHEN HEARING DESIGNATED LETTER: 1/1
1. ALTERNATING TRAIL MAKING: 0/1
WHAT IS THE TOTAL SCORE (OUT OF 30): 19
11. FOR EACH PAIR OF WORDS, WHAT CATEGORY DO THEY BELONG TO (OUT OF 2): 2/2
8. SERIAL SUBTRACTION OF 7S: 1/5
CATEGORY CUE (IF APPLICABLE): 2
4. NAME EACH OF THE THREE ANIMALS SHOWN: 3/3

## 2023-05-23 ASSESSMENT — FIBROSIS 4 INDEX: FIB4 SCORE: 1.36

## 2023-05-23 NOTE — PROGRESS NOTES
"Reason for Neurology Consult:  Concern for Dementia    History of present illness:    Derek Guzman 72 y.o. right handed woman who is here with her . They recently moved from [place] and moved to the [facility] about 1 year ago.  She graduated HS and retired from [employer] after working nearly 17 years or so (she did \"intake\" and gather information).    Onset per  around 2  years ago Derek started having issues being easily distracted. Ex: she would get  coffee and see something else that needed to be done and completely forgot what she wanted to do.  She was easily distracted and recognized this issue.   Short term memory has clearly and gradually worsened in the last 6-12 months. She will ask her  \"what are we going to do\" (despite being told what they were going to do.\")> this issue has become more frequent.    Prevagen taken for about 8 months> no clear benefit.    No history of stroke events known to have occurred in adult life.  No history of seizure type events or a divya diagnosis of epilepsy.  No history of concussion(s).    No falls or near falls in the last 6 months.    No postural instability evolving or ongoing in the last 6 months.    Denies feeling or being depressed,anxious,nervous,paranoid,delusional or hallucinatory in the recent 3-6 months ( agreed upon by ).      Average sleep- 7-8 hours most nights in the recent months. NO REM Behavioral symptoms admitted to in recent months. No snoring,gasping,snorting behaviors.        Smoked- early 20(s)- 1/4 pack for 5 years ago.  Minimal alcohol use in adult life.    Mother: probable Alzheimer's Disease (onset in late 50's with subtle onset and progression to  in her early 70s).  Mother's brother:  probable Alz. Disease (onset somewhere early to mid 60s and  just before age 70)  Patient Active Problem List    Diagnosis Date Noted    Elevated LDL cholesterol level 2018    Prediabetes " 2018    Osteopenia of multiple sites 2017    Obesity (BMI 30-39.9) 2017    Primary osteoarthritis of both hands 2017    Sigmoid diverticulosis 2016    Degenerative disc disease, lumbar 2014    History of melanoma 10/10/2013    History of colonoscopy with polypectomy 2010    Non-seasonal allergic rhinitis 10/01/2009       Past medical history:   Past Medical History:   Diagnosis Date    Allergic rhinitis 10/1/2009    Allergy     Infectious disease     pt works at [employer]    Osteopenia     Pain     left foot    Sigmoid diverticulosis 2016       Past surgical history:   Past Surgical History:   Procedure Laterality Date    ORIF, FRACTURE, TIBIA Left 2017    Procedure: TIBIA ORIF;  Surgeon: Casey Jewell M.D.;  Location: SURGERY [facility];  Service:     TOE FUSION  2011    Performed by BARTOLOME ROBLES at SURGERY [facility]    BONE GRAFT  2011    Performed by BARTOLOME ROBLES at SURGERY [facility]    SHOULDER ARTHROSCOPY  2009    right; Performed by RICHARD ELIZONDO at SURGERY [facility] DAY [facility]    ROTATOR CUFF REPAIR  2009    Performed by RICHARD ELIZONDO at SURGERY SAME DAY [facility]    [place]  DELIVERY ONLY           Social history:   Social History     Socioeconomic History    Marital status:      Spouse name: Not on file    Number of children: Not on file    Years of education: Not on file    Highest education level: 12th grade   Occupational History    Not on file   Tobacco Use    Smoking status: Former     Packs/day: 0.50     Years: 7.00     Pack years: 3.50     Types: Cigarettes     Quit date: 1978     Years since quittin.4    Smokeless tobacco: Never    Tobacco comments:     avoid all tobacco products   Vaping Use    Vaping Use: Never used   Substance and Sexual Activity    Alcohol use: No     Alcohol/week: 0.0 oz    Drug use: No    Sexual activity: Yes     Partners: Male   Other Topics  Concern    Not on file   Social History Narrative    Not on file     Social Determinants of Health     Financial Resource Strain: Low Risk  (4/11/2023)    Overall Financial Resource Strain (CARDIA)     Difficulty of Paying Living Expenses: Not very hard   Food Insecurity: Food Insecurity Present (4/11/2023)    Hunger Vital Sign     Worried About Running Out of Food in the Last Year: Sometimes true     Ran Out of Food in the Last Year: Never true   Transportation Needs: No Transportation Needs (4/11/2023)    PRAPARE - Transportation     Lack of Transportation (Medical): No     Lack of Transportation (Non-Medical): No   Physical Activity: Insufficiently Active (4/11/2023)    Exercise Vital Sign     Days of Exercise per Week: 6 days     Minutes of Exercise per Session: 10 min   Stress: Stress Concern Present (4/11/2023)    [place] [employer] - Occupational Stress Questionnaire     Feeling of Stress : To some extent   Social Connections: Moderately Isolated (4/11/2023)    Social Connection and Isolation Panel [NHANES]     Frequency of Communication with Friends and Family: Twice a week     Frequency of Social Gatherings with Friends and Family: Once a week     Attends Voodoo Services: Never     Active Member of Clubs or Organizations: No     Attends Club or Organization Meetings: Never     Marital Status:    Intimate Partner Violence: Not on file   Housing Stability: Low Risk  (4/11/2023)    Housing Stability Vital Sign     Unable to Pay for Housing in the Last Year: No     Number of Places Lived in the Last Year: 1     Unstable Housing in the Last Year: No       Family history:   Family History   Problem Relation Age of Onset    Cancer Mother         colon cancer twice, lung cancer    Diabetes Mother     Heart Disease Father         mi    Diabetes Father     Diabetes Other     Heart Disease Other     Cancer Other          Current medications:   Current Outpatient Medications   Medication  "   Multiple Vitamin (MULTI-VITAMIN PO)    Ascorbic Acid (VITAMIN C PO)    diphenhydrAMINE (BENADRYL) 25 MG Tab    Calcium Carbonate-Vitamin D (CALCIUM + D PO)    meloxicam (MOBIC) 7.5 MG Tab    azelastine (ASTELIN) 137 MCG/SPRAY nasal spray    cetirizine (ZYRTEC) 10 MG TABS     No current facility-administered medications for this visit.       Medication Allergy:  Allergies   Allergen Reactions    Tape Rash     blisters    Trace Metals      Nickel            Physical examination:   Vitals:    05/23/23 1239   BP: 122/82   BP Location: Right arm   Patient Position: Sitting   BP Cuff Size: Adult   Pulse: 64   Temp: 36.6 °C (97.9 °F)   TempSrc: Temporal   SpO2: 95%   Weight: 75.4 kg (166 lb 3.6 oz)   Height: 1.626 m (5' 4\")       Normal cephalic atraumatic.  There is full range of movement around the neck in all directions without restrictions or discrete pain evoked triggers.  No lower extremity edema.      Neurological  Exam:      [place]efry Cognitive Assessment (MOCA) Version 7.1    Years of Education: 2 years college    TOTAL SCORE: 19/30  (to be scanned into the MEDIA section in the E.M.R.)          Mental status: Awake, alert and fully oriented to person, place, time, and situation. Normal attention and concentration.  Did not appear/act combative,irritable,anxious,paranoid/delusional or aggressive to or with me.    Speech and language: Speech is fluent without errors, clear, intact to repetition, and intact to naming.     Follows 3 step motor commands in sequence without significant delay and correctly.    Cranial nerve exam:  II: Pupils are equally round and reactive to light. Visual fields are intact by confrontation.  III, IV, VI: EOMI, no diplopia, no ptosis.  V: Sensation to light touch is normal over V1-3 distributions bilaterally.  .  VII: Facial movements are symmetrical. There is no facial droop. .  VIII: Hearing intact to soft speech and finger rub bilaterally  IX: Palate elevates symmetrically, uvula is " midline. Dysarthria is not present.  XI: Shoulder shrug are symmetrical and strong.   XII: Tongue protrudes midline.      Motor exam:  Muscle tone is normal in all 4 limbs. and No abnormal movements appreciated.    Muscle strength:    Neck Flexors/Extensors: 5/5       Right  Left  Deltoid   5/5  5/5      Biceps   5/5  5/5  Triceps             5/5  5/5   Wrist extensors 5/5  5/5  Wrist flexors  5/5  5/5     5/5  5/5  Interossei  5/5  5/5  Thenar (APB)  5/5  5/5   Hip flexors  5/5  5/5  Quadriceps  5/5  5/5    Hamstrings  5/5  5/5  Dorsiflexors  5/5  5/5  Plantarflexors  5/5  5/5  Toe extension  5/5  5/5          Reflexes:       Right  Left  Biceps   2/4  2/4  Triceps              2/4  2/4  Brachioradialis   2/4  2/4  Knee jerk  2/4  2/4  Ankle jerk  2/4  2/4     Frontal release signs are absent    bilaterally toes are downgoing to plantar stimulation..    Coordination (finger-to-nose, heel/knee/shin, rapid alternating movements) was normal.     There was no ataxia, no tremors, and no dysmetria.     Station and gait were normal. Easily stands up from exam chair without retropulsion,veering,leaning,swaying (to either side).       Labs and Tests:             Component Ref Range & Units 1 mo ago 1 yr ago 2 yr ago 3 yr ago 4 yr ago   Glycohemoglobin 4.0 - 5.6 % 5.5  5.6 CM  5.5 R, CM  5.7 High  R, CM  5.8          Notes             Component Ref Range & Units 1 mo ago 10 mo ago 1 yr ago 2 yr ago 3 yr ago 4 yr ago 6 yr ago   Cholesterol,Tot 100 - 199 mg/dL 200 High   198  222 High   213 High   192  186  192    Triglycerides 0 - 149 mg/dL 73  138  79  66  86  76  80    HDL >=40 mg/dL 54  60  69  67  52  54  56    LDL <100 mg/dL 131 High   110 High   137 High   133 High   123 High   117 High   120 High           Range & Units 1 mo ago 10 mo ago 1 yr ago 2 yr ago 3 yr ago 4 yr ago 6 yr ago    Sodium 135 - 145 mmol/L 143  141  140  145  139  144  137    Potassium 3.6 - 5.5 mmol/L 4.3  4.6  4.6  4.6  4.4  4.4  4.6     Chloride 96 - 112 mmol/L 108  107  106  110  108  112  105    Co2 20 - 33 mmol/L 24  26  25  28  24  25  28    Anion Gap 7.0 - 16.0 11.0  8.0  9.0  7.0  7.0 R  7.0 R  4.0 R    Glucose 65 - 99 mg/dL 74  86  84  76  79  94  94    Bun 8 - 22 mg/dL 15  20  17  20  30 High   25 High   23 High     Creatinine 0.50 - 1.40 mg/dL 0.62  0.72  0.68  0.62  0.69  0.78  0.74    Calcium 8.5 - 10.5 mg/dL 9.6  9.5 R  10.4  10.1  9.5  9.8  9.8    AST(SGOT) 12 - 45 U/L 18  16  22  27  22  21  18    ALT(SGPT) 2 - 50 U/L 14  12  16  24  14  20  15    Alkaline Phosphatase 30 - 99 U/L 80  61  65  69  59  62  68    Total Bilirubin 0.1 - 1.5 mg/dL 0.4  0.4  0.4  0.4  0.5  0.4  0.4    Albumin 3.2 - 4.9 g/dL 3.9  4.1  4.3  4.3  4.1  3.9  3.8    Total Protein 6.0 - 8.2 g/dL 6.7  6.4  6.7  6.4  6.1  6.6  6.5    Globulin 1.9 - 3.5 g/dL 2.8  2.3  2.4  2.1  2.0  2.7  2.7       TSH  Order: [id number]  Status: Final result     Visible to patient: Yes (seen)     Next appt: None     Dx: Mixed hyperlipidemia     0 Result Notes          Component Ref Range & Units 1 mo ago 1 yr ago 5 yr ago 7 yr ago   TSH 0.380 - 5.330 uIU/mL 0.720  1.330 CM  0.870 R  1.050 R            NEUROIMAGING:         Impression/Plans/Recommendations:      Early to Mild Stage of Dementia- probable Alzheimer's Disease with slow or gradual changes in short term memory and additional problems with word retrieval and executive functioning.    There are no features of parkinsonism.    Today, I reviewed the clinical criteria and reviewed several  scenarios of the differences being using the medical terms of normal brain aging (age associated memory impairment),  Mild Cognitive Impairment (MCI) and Dementia.    Dementia  is a syndrome but statistically and for the majority of patients  occurs due  to a more rapid aging of the brain tissue or potentially from injury to certain parts of one's brain ( often from such contributing factors as  the cumulative effects of alcohol, from one or  "more ischemic or hemmorhagic stroke(s), from neurodegeneration or long standing with/without suboptimally controlled Hypertension). It is for some of these potential factors as to why I recommend a brain imaging test (Head CT or Brain MRI) be done for the 1st time or in certain circumstances repeated for comparison purposes  as such imaging can suggest one or more factors as to the reason(s) for the person's cognitive/memory changes. In fact, a normal or \"age related\" finding on a brain imaging test in and of itself is useful clinical and objective information to have in the evaluation of a person who has either an acute, evolving or even chronic (months to years) long cognitive/memory complaint.    Additional factors or contributors to Brain Health issues can be summarized in several books/references which I discussed as well today.     Goals going forwards include:    A. Paying attention to one's risk factors and reducing their prevalence or potential impact on one's changing memory/thinking> an excellent example would be to stop smoking, reduce or eliminate alcohol use (depending on the amount and frequency of usage), maintain good blood pressure control by buying a digital arm blood pressure cuff such as an OMRON Series 3 or 5 and checking one's blood pressure atleast 3 days per week (in the am and early afternoon) that the numbers are under 140/90 and working as needed with the primary care doctor  to optimize blood pressure control).    B. Encouraging proper sleep hygiene which for most adults is 7 to 8 hours of uninterrupted sleep per night.      C. Encouraging some form of exercise preferable aerobic forms to be done (4 to 5 days per week- 30 minutes minimum per day)> 150 minutes per week as a goal. Example activities could include fast walking (up a slight incline), jogging, cycling (road or stationary), swimming,tennis. Essentially, even basic walking on a flat surface or a treadmill would be better than " doing nothing.    D. Maintaining or forming new social contacts with family and friends  and a positive attitude despite the concerns and/or ongoing issues with thinking and/or memory.    E. Eating well which means a diet low in salt  (under 2 grams per day), sugar and saturated fat.    F. Maintaining one's BMI (Body Mass Index) under 30.    G. Consideration of the use of cognitive enhancers (acetylcholinerase inhibitors such as Aricept vs an NMDA Receptor agent like Namenda). Pros and cons of such compounds in terms of predicted efficacy and side effects profiles reviewed. At this time will hold off on such medication(s).    H. Will hold off on a Brain PET Scan after discussion options of doing it today (pros and cons).    I. Discussed importance of exercise and the MIND Diet today.    J. OT guided Driving Simulator evaluation to be done (Derek is agreement).    K.  Blood work to be done (Vitamin B levels)    L. I am keeping up with editorials and  comments from  many academic neurologists throughout the [place] who are writing reviews and summaries of the present state of this provisionally approved anti amyloid compound (aducanumab) and Leqembi.      Based on the critique of the data so far,  there are clear risks of using these compounds including the cumulative risks of microfibrosis of the cortical brain tissue from the effects of the inflammatory removal of amyloid , the risks of potentially giving or needing to get an acute clot busting medication (like Teneceplase) to treat an acute ischemic stroke in evolution and the longer term risks of spontaneous brain bleeds and brain swelling (some of which can be life threatening events).    I have discussed with the patient and family today that given  the great controversy about the study's data and analysis of the lack of clinical  efficacy to this point in time, I feel that I need to wait and see reasonable post marketing data and/or more robust efficacy data  supported by the academic community and/or the American Academy of Neurology  before making a commitment to prescribe such a compound(s)  and  where there is more than  a minor/minimal amount of risk to the patient involved in being administered this compound on a chronic (monthly) basis.      M.  Brain MRI to be ordered to evaluate general structure of the brain tissue.    N. Will hold off a Alzheimer's Connect form at this time after discussing with Derek and .    I. Gave Derek and  several books to read about Brain Health topics and encouraged reduction in saturated fat intake.          have performed  a history and physical exam and a directed /focused  ROS today.    Total time spent today or this patient's care was  55  minutes  and included reviewing  the diagnostic workup to date (such as labs and imaging as well as interpreting such tests relevant to this patient's neurological condition),  reviewing/obtaining separately obtained history (from patient and/or accompanying ffamily member)  for today's neurological problem(s) ,counseling and educating the patient and family member on issues related to cognition/memory and cognitive health factors and documenting  the clinical information in the EMR.    Follow up in 6 months or so.        Jp Mitchell MD  [place] [facility].   [facility]

## 2023-06-12 NOTE — IP AVS SNAPSHOT
" After Visit Summary                                                                                                                  Name:Derek Guzman  Medical Record Number:[id number]  CSN: [id number]    YOB: 1950   Age: 66 y.o.  Sex: female  HT:1.651 m (5' 5\") WT: 84.369 kg (186 lb)          Admit Date: 1/20/2017     Discharge Date:   Today's Date: 1/21/2017  Attending Doctor:  Casey Jewell M.D.                  Allergies:  Tape            Discharge Instructions       Discharge Instructions    *Follow up with Dr. Jewell in 10-14 days in his office  *Weight bearing not allowed on the left leg for 6-8 weeks                 *Activity as tolerated  *Use assistive device for all activity  *Continue exercises provided by physical therapy  *Elevate leg as needed  *Ice as needed (20 minutes every 1-2 hours)  *keep dressing clean and dry until follow up  *Ok to shower with dressing covered  *No soaking of the incision; no baths, hot tubs, or swimming until cleared by doctor         *No driving until cleared by doctor  *Take medications as prescribed by doctor  *Call doctor’s office with any questions or concerns   Discharged to home by car with relative. Discharged via wheelchair, hospital escort: Yes.  Special equipment needed: Walker    Be sure to schedule a follow-up appointment with your primary care doctor or any specialists as instructed.     Discharge Plan:   Influenza Vaccine Indication: Patient Refuses    I understand that a diet low in cholesterol, fat, and sodium is recommended for good health. Unless I have been given specific instructions below for another diet, I accept this instruction as my diet prescription.     Special Instructions: Discharge instructions for the Orthopedic Patient    Follow up with Primary Care Physician within 2 weeks of discharge to home, regarding:  Review of medications and diagnostic testing.  Surveillance for medical complications.  Workup and treatment of " osteoporosis, if appropriate.     -Is this a Joint Replacement patient? No    -Is this patient being discharged with medication to prevent blood clots?  No    · Is patient discharged on Warfarin / Coumadin?   No     · Is patient Post Blood Transfusion?  No    Depression / Suicide Risk    As you are discharged from this [facility] facility, it is important to learn how to keep safe from harming yourself.    Recognize the warning signs:  · Abrupt changes in personality, positive or negative- including increase in energy   · Giving away possessions  · Change in eating patterns- significant weight changes-  positive or negative  · Change in sleeping patterns- unable to sleep or sleeping all the time   · Unwillingness or inability to communicate  · Depression  · Unusual sadness, discouragement and loneliness  · Talk of wanting to die  · Neglect of personal appearance   · Rebelliousness- reckless behavior  · Withdrawal from people/activities they love  · Confusion- inability to concentrate     If you or a loved one observes any of these behaviors or has concerns about self-harm, here's what you can do:  · Talk about it- your feelings and reasons for harming yourself  · Remove any means that you might use to hurt yourself (examples: pills, rope, extension cords, firearm)  · Get professional help from the community (Mental Health, Substance Abuse, psychological counseling)  · Do not be alone:Call your Safe Contact- someone whom you trust who will be there for you.  · Call your local CRISIS HOTLINE 199-0886 or 492-237-5994  · Call your local Children's Mobile Crisis Response Team [place] (894) 698-5388 or www.Wikia  · Call the toll free National Suicide Prevention Hotlines   · National Suicide Prevention Lifeline 497-751-SVSH (6333)  · National Hope Line Network 800-SUICIDE (185-1764)        Follow-up Information     1. Follow up with Casey Jewell M.D..    Specialty:  Orthopaedics    Why:  Call [facility]  to schedule appt for 10-14 days postop    Contact information    [address]  [address]  [place] [address]  [phone]           Discharge Medication Instructions:    Below are the medications your physician expects you to take upon discharge:    Review all your home medications and newly ordered medications with your doctor and/or pharmacist. Follow medication instructions as directed by your doctor and/or pharmacist.    Please keep your medication list with you and share with your physician.               Medication List      START taking these medications        Instructions    oxycodone-acetaminophen  MG Tabs   Commonly known as:  PERCOCET-10    Take 0.5-1 Tabs by mouth every four hours as needed for Moderate Pain.   Dose:  0.5-1 Tab         CONTINUE taking these medications        Instructions    CALCIUM + D PO    Take 1 Tab by mouth every day.   Dose:  1 Tab       diphenhydrAMINE 25 MG Tabs   Commonly known as:  BENADRYL    Take 25 mg by mouth at bedtime as needed for Sleep.   Dose:  25 mg       MULTI-VITAMIN PO    Take 1 Tab by mouth every day.   Dose:  1 Tab       ZYRTEC 10 MG Tabs   Generic drug:  cetirizine    Take 10 mg by mouth 2 times a day.   Dose:  10 mg         STOP taking these medications     tramadol 50 MG Tabs   Commonly known as:  ULTRAM               Instructions           Diet / Nutrition:    Follow any diet instructions given to you by your doctor or the dietician, including how much salt (sodium) you are allowed each day.    If you are overweight, talk to your doctor about a weight reduction plan.    Activity:    Remain physically active following your doctor's instructions about exercise and activity.    Rest often.     Any time you become even a little tired or short of breath, SIT DOWN and rest.    Worsening Symptoms:    Report any of the following signs and symptoms to the doctor's office immediately:    *Pain of jaw, arm, or neck  *Chest pain not relieved by medication                                *Dizziness or loss of consciousness  *Difficulty breathing even when at rest   *More tired than usual                                       *Bleeding drainage or swelling of surgical site  *Swelling of feet, ankles, legs or stomach                 *Fever (>100ºF)  *Pink or blood tinged sputum  *Weight gain (3lbs/day or 5lbs /week)           *Shock from internal defibrillator (if applicable)  *Palpitations or irregular heartbeats                *Cool and/or numb extremities    Stroke Awareness    Common Risk Factors for Stroke include:    Age  Atrial Fibrillation  Carotid Artery Stenosis  Diabetes Mellitus  Excessive alcohol consumption  High blood pressure  Overweight   Physical inactivity  Smoking    Warning signs and symptoms of a stroke include:    *Sudden numbness or weakness of the face, arm or leg (especially on one side of the body).  *Sudden confusion, trouble speaking or understanding.  *Sudden trouble seeing in one or both eyes.  *Sudden trouble walking, dizziness, loss of balance or coordination.Sudden severe headache with no known cause.    It is very important to get treatment quickly when a stroke occurs. If you experience any of the above warning signs, call 911 immediately.                   Disclaimer         Quit Smoking / Tobacco Use:    I understand the use of any tobacco products increases my chance of suffering from future heart disease or stroke and could cause other illnesses which may shorten my life. Quitting the use of tobacco products is the single most important thing I can do to improve my health. For further information on smoking / tobacco cessation call a Toll Free Quit Line at 1-581.517.4144 (*[employer]) or 1-706.122.9335 (American Lung Association) or you can access the web based program at www.lungusa.org.    [place] Tobacco Users Help Line:  (676) 350-8727       Toll Free: 1-712.497.7484  Quit Tobacco Program [facility]  Services (484)850-9695    Crisis Hotline:    National Crisis Hotline:  5-300-GXAZOCW or 1-410.843.2741    [place] Crisis Hotline:    1-499.979.3401 or 340-138-4656    Discharge Survey:   Thank you for choosing [facility]. We hope we did everything we could to make your hospital stay a pleasant one. You may be receiving a phone survey and we would appreciate your time and participation in answering the questions. Your input is very valuable to us in our efforts to improve our service to our patients and their families.        My signature on this form indicates that:    1. I have reviewed and understand the above information.  2. My questions regarding this information have been answered to my satisfaction.  3. I have formulated a plan with my discharge nurse to obtain my prescribed medications for home.                  Disclaimer         __________________________________                     __________       ________                       Patient Signature                                                 Date                    Time         Patient

## 2023-06-13 ENCOUNTER — APPOINTMENT (OUTPATIENT)
Dept: OCCUPATIONAL THERAPY | Facility: REHABILITATION | Age: 73
End: 2023-06-13
Attending: PSYCHIATRY & NEUROLOGY
Payer: MEDICARE

## 2023-07-16 ENCOUNTER — HOSPITAL ENCOUNTER (OUTPATIENT)
Dept: RADIOLOGY | Facility: MEDICAL CENTER | Age: 73
End: 2023-07-16
Attending: PSYCHIATRY & NEUROLOGY
Payer: MEDICARE

## 2023-07-16 DIAGNOSIS — F02.A0 MILD LATE ONSET ALZHEIMER'S DEMENTIA WITHOUT BEHAVIORAL DISTURBANCE, PSYCHOTIC DISTURBANCE, MOOD DISTURBANCE, OR ANXIETY (HCC): ICD-10-CM

## 2023-07-16 DIAGNOSIS — G30.1 MILD LATE ONSET ALZHEIMER'S DEMENTIA WITHOUT BEHAVIORAL DISTURBANCE, PSYCHOTIC DISTURBANCE, MOOD DISTURBANCE, OR ANXIETY (HCC): ICD-10-CM

## 2023-07-16 PROCEDURE — 70551 MRI BRAIN STEM W/O DYE: CPT

## 2023-07-18 ENCOUNTER — OFFICE VISIT (OUTPATIENT)
Dept: MEDICAL GROUP | Facility: PHYSICIAN GROUP | Age: 73
End: 2023-07-18
Payer: MEDICARE

## 2023-07-18 VITALS
RESPIRATION RATE: 12 BRPM | DIASTOLIC BLOOD PRESSURE: 78 MMHG | TEMPERATURE: 97.1 F | BODY MASS INDEX: 28.07 KG/M2 | OXYGEN SATURATION: 97 % | WEIGHT: 164.4 LBS | HEART RATE: 63 BPM | HEIGHT: 64 IN | SYSTOLIC BLOOD PRESSURE: 128 MMHG

## 2023-07-18 DIAGNOSIS — G30.1 MILD LATE ONSET ALZHEIMER'S DEMENTIA WITHOUT BEHAVIORAL DISTURBANCE, PSYCHOTIC DISTURBANCE, MOOD DISTURBANCE, OR ANXIETY (HCC): ICD-10-CM

## 2023-07-18 DIAGNOSIS — M85.89 OSTEOPENIA OF MULTIPLE SITES: ICD-10-CM

## 2023-07-18 DIAGNOSIS — F02.A0 MILD LATE ONSET ALZHEIMER'S DEMENTIA WITHOUT BEHAVIORAL DISTURBANCE, PSYCHOTIC DISTURBANCE, MOOD DISTURBANCE, OR ANXIETY (HCC): ICD-10-CM

## 2023-07-18 DIAGNOSIS — E78.00 ELEVATED LDL CHOLESTEROL LEVEL: ICD-10-CM

## 2023-07-18 DIAGNOSIS — Z12.31 ENCOUNTER FOR SCREENING MAMMOGRAM FOR MALIGNANT NEOPLASM OF BREAST: ICD-10-CM

## 2023-07-18 DIAGNOSIS — Z86.010 HISTORY OF COLONOSCOPY WITH POLYPECTOMY: ICD-10-CM

## 2023-07-18 DIAGNOSIS — Z12.12 ENCOUNTER FOR SCREENING FOR COLORECTAL MALIGNANT NEOPLASM IN HIGH RISK PATIENT: ICD-10-CM

## 2023-07-18 DIAGNOSIS — Z98.890 HISTORY OF COLONOSCOPY WITH POLYPECTOMY: ICD-10-CM

## 2023-07-18 DIAGNOSIS — Z12.11 ENCOUNTER FOR SCREENING FOR COLORECTAL MALIGNANT NEOPLASM IN HIGH RISK PATIENT: ICD-10-CM

## 2023-07-18 DIAGNOSIS — E78.2 MIXED HYPERLIPIDEMIA: ICD-10-CM

## 2023-07-18 DIAGNOSIS — Z23 NEED FOR VACCINATION: ICD-10-CM

## 2023-07-18 DIAGNOSIS — Z91.89 ENCOUNTER FOR SCREENING FOR COLORECTAL MALIGNANT NEOPLASM IN HIGH RISK PATIENT: ICD-10-CM

## 2023-07-18 PROCEDURE — 90471 IMMUNIZATION ADMIN: CPT | Performed by: NURSE PRACTITIONER

## 2023-07-18 PROCEDURE — 90715 TDAP VACCINE 7 YRS/> IM: CPT | Performed by: NURSE PRACTITIONER

## 2023-07-18 PROCEDURE — 99214 OFFICE O/P EST MOD 30 MIN: CPT | Mod: 25 | Performed by: NURSE PRACTITIONER

## 2023-07-18 PROCEDURE — 3078F DIAST BP <80 MM HG: CPT | Performed by: NURSE PRACTITIONER

## 2023-07-18 PROCEDURE — 3074F SYST BP LT 130 MM HG: CPT | Performed by: NURSE PRACTITIONER

## 2023-07-18 ASSESSMENT — ENCOUNTER SYMPTOMS
EYES NEGATIVE: 1
GASTROINTESTINAL NEGATIVE: 1
CHILLS: 0
SHORTNESS OF BREATH: 0
NEUROLOGICAL NEGATIVE: 1
FEVER: 0
MEMORY LOSS: 1
WEIGHT LOSS: 0

## 2023-07-18 ASSESSMENT — FIBROSIS 4 INDEX: FIB4 SCORE: 1.36

## 2023-07-19 NOTE — PROGRESS NOTES
Chief Complaint   Patient presents with    Establish Care    Requesting Labs      Subjective:     Derek Guzman is a 72 y.o. female presenting for      Problem   Mild Late Onset Alzheimer's Dementia Without Behavioral Disturbance, Psychotic Disturbance, Mood Disturbance, Or Anxiety ([facility])    Is following with Dr. Mitchell for mild memory issues   States she is supposed to follow up with Dr. Mitchell for a drive simulation to see if she should still drive  New labs and imaging was ordered by dr Mitchell which patient is going to complete  Spoke with  who notes her short term memory is bad but long term is stable   Patient was poor historian regarding past medical care thinking that most was completed a few years ago- last labs and flower was 5/2023 with previous provider     Mixed Hyperlipidemia    labs from April 2023  Cholesterol 200   triglycerides 73  HDL 53       Elevated Ldl Cholesterol Level    The 10-year ASCVD risk score (Cassius BENITO Jr., et al., 2013) is: 6.6%    Values used to calculate the score:      Age: 69 years      Sex: Female      Is Non- : No      Diabetic: No      Tobacco smoker: No      Systolic Blood Pressure: 118 mmHg      Is BP treated: No      HDL Cholesterol: 52 mg/dL      Total Cholesterol: 192 mg/dL  Above completed in May 2023, not on any statins         Osteopenia of Multiple Sites    9/2021  Left forearm -2.4  Left femur -1.9  Takes OCT vitamin D and Calcium   Is active with walking     Bmi 28.0-28.9,Adult    Has been active with losing weight  Last pcp visit 5/2023 was 166 today she is 164     History of Colonoscopy With Polypectomy    Recall in 5 years form 2016  New order placed  History of colon cancer with mother          Review of Systems   Constitutional:  Negative for chills, fever, malaise/fatigue and weight loss.   HENT: Negative.     Eyes: Negative.    Respiratory:  Negative for shortness of breath.    Cardiovascular:  Negative for chest pain.    Gastrointestinal: Negative.    Genitourinary: Negative.    Skin: Negative.    Neurological: Negative.    Psychiatric/Behavioral:  Positive for memory loss.           Current Outpatient Medications:     Multiple Vitamin (MULTI-VITAMIN PO), Take  by mouth., Disp: , Rfl:     Ascorbic Acid (VITAMIN C PO), Take  by mouth., Disp: , Rfl:     diphenhydrAMINE (BENADRYL) 25 MG Tab, Take 25 mg by mouth at bedtime as needed for Sleep., Disp: , Rfl:     Calcium Carbonate-Vitamin D (CALCIUM + D PO), Take 1 Tab by mouth every day., Disp: , Rfl:     Past Medical History:   Diagnosis Date    Allergic rhinitis 10/01/2009    Allergy     Osteopenia     Pain     plate to left foot    Sigmoid diverticulosis 2016       Past Surgical History:   Procedure Laterality Date    ORIF, FRACTURE, TIBIA Left 2017    Procedure: TIBIA ORIF;  Surgeon: Casey Jewell M.D.;  Location: SURGERY [facility];  Service:     TOE FUSION  2011    Performed by BARTOLOME ROBLES at SURGERY [facility]    BONE GRAFT  2011    Performed by BARTOLOME ROBLES at SURGERY [facility]    SHOULDER ARTHROSCOPY  2009    right; Performed by RICHARD ELIZONDO at SURGERY SAME DAY [facility]    ROTATOR CUFF REPAIR  2009    Performed by RICHARD ELIZONDO at SURGERY SAME DAY [facility]    NM  DELIVERY ONLY         Social History     Tobacco Use    Smoking status: Former     Packs/day: 0.50     Years: 7.00     Pack years: 3.50     Types: Cigarettes     Quit date: 1978     Years since quittin.5    Smokeless tobacco: Never    Tobacco comments:     avoid all tobacco products   Vaping Use    Vaping Use: Never used   Substance Use Topics    Alcohol use: No     Alcohol/week: 0.0 oz    Drug use: No       Family History   Problem Relation Age of Onset    Colorectal Cancer Mother     Cancer Mother         colon cancer twice, lung cancer    Diabetes Mother     Heart Disease Father         mi    Diabetes Father     Heart  "Attack Father     No Known Problems Sister     No Known Problems Sister     Diabetes Brother     No Known Problems Brother     No Known Problems Maternal Aunt     No Known Problems Maternal Uncle     No Known Problems Paternal Aunt     No Known Problems Paternal Uncle     No Known Problems Maternal Grandmother     No Known Problems Maternal Grandfather     No Known Problems Paternal Grandmother     No Known Problems Paternal Grandfather     No Known Problems Son     No Known Problems Son        Tape and Trace metals    Allergies, past medical history, past surgical history, family history, social history reviewed and updated    Objective:     Vitals: /78 (BP Location: Right arm, Patient Position: Sitting, BP Cuff Size: Adult)   Pulse 63   Temp 36.2 °C (97.1 °F) (Temporal)   Resp 12   Ht 1.626 m (5' 4\")   Wt 74.6 kg (164 lb 6.4 oz)   SpO2 97%   BMI 28.22 kg/m²     Physical Exam  Constitutional:       General: She is not in acute distress.     Appearance: Normal appearance. She is normal weight. She is not ill-appearing.   HENT:      Head: Normocephalic and atraumatic.   Eyes:      Extraocular Movements: Extraocular movements intact.      Conjunctiva/sclera: Conjunctivae normal.      Pupils: Pupils are equal, round, and reactive to light.   Cardiovascular:      Rate and Rhythm: Normal rate and regular rhythm.      Pulses: Normal pulses.      Heart sounds: Normal heart sounds.   Pulmonary:      Effort: Pulmonary effort is normal.      Breath sounds: Normal breath sounds.   Musculoskeletal:         General: Normal range of motion.      Cervical back: Normal range of motion.   Skin:     General: Skin is warm and dry.      Capillary Refill: Capillary refill takes less than 2 seconds.   Neurological:      Mental Status: She is alert. Mental status is at baseline.   Psychiatric:         Mood and Affect: Mood normal.         Behavior: Behavior normal.         Thought Content: Thought content normal.         " Judgment: Judgment normal.         Assessment/Plan:     1. History of colonoscopy with polypectomy  Chronic and stable condition   New orders placed for colonoscopy    2. Encounter for screening for colorectal malignant neoplasm in high risk patient  - Referral to GI for Colonoscopy    3. Osteopenia of multiple sites  Chronic and stable condition   Continue with Vitamin D and Calcium    4. BMI 28.0-28.9,adult  Chronic and stable condition   Discussed [employer] videos and online home workouts  It is recommended by the [place] Preventative Services Task Force (USPSTF) that adults 18 years and older participate in at least 150 minutes of moderate activity as well as 2 days of strength training weekly.       5. Mixed hyperlipidemia  Chronic and stable condition   Discussed mediterranean diet    6. Elevated LDL cholesterol level  Chronic and stable condition   See #5    7. Mild late onset Alzheimer's dementia without behavioral disturbance, psychotic disturbance, mood disturbance, or anxiety (HCC)  Chronic and stable condition   Continue to follow with neurology   Get labs completed  Recent MRI shows stable MRI of brain    8. Need for vaccination  - Tdap Vaccine =>8YO IM    9. Encounter for screening mammogram for malignant neoplasm of breast  - MA-SCREENING MAMMO BILAT W/TOMOSYNTHESIS W/CAD; Future      Discussed with patient possible alternative diagnoses, patient is to take all medications as prescribed.     If symptoms persist FU w/PCP, if symptoms worsen go to emergency room.     If experiencing any side effects from prescribed medications reports to the office immediately or go to emergency room.    Reviewed indication, dosage, usage and potential adverse effects of prescribed medications.     Reviewed risks and benefits of treatment plan. Patient verbalizes understanding of all instruction and verbally agrees to plan.    Discussed plan with the patient, and she agrees to the above.      I personally reviewed  prior external notes and test results pertinent to today's visit.        Return for pending imaging.      Please note that this dictation was created using voice recognition software. I have made every reasonable attempt to correct obvious errors, but I expect that there may be errors of grammar and possibly content that I did not discover before finalizing the note.

## 2023-07-26 ENCOUNTER — APPOINTMENT (OUTPATIENT)
Dept: RADIOLOGY | Facility: MEDICAL CENTER | Age: 73
End: 2023-07-26
Attending: NURSE PRACTITIONER
Payer: MEDICARE

## 2023-07-26 DIAGNOSIS — Z12.31 ENCOUNTER FOR SCREENING MAMMOGRAM FOR MALIGNANT NEOPLASM OF BREAST: ICD-10-CM

## 2023-07-26 PROCEDURE — 77063 BREAST TOMOSYNTHESIS BI: CPT

## 2023-08-14 ENCOUNTER — OCCUPATIONAL THERAPY (OUTPATIENT)
Dept: OCCUPATIONAL THERAPY | Facility: REHABILITATION | Age: 73
End: 2023-08-14
Attending: PSYCHIATRY & NEUROLOGY
Payer: MEDICARE

## 2023-08-14 DIAGNOSIS — G30.1 MILD LATE ONSET ALZHEIMER'S DEMENTIA WITHOUT BEHAVIORAL DISTURBANCE, PSYCHOTIC DISTURBANCE, MOOD DISTURBANCE, OR ANXIETY (HCC): ICD-10-CM

## 2023-08-14 DIAGNOSIS — F02.A0 MILD LATE ONSET ALZHEIMER'S DEMENTIA WITHOUT BEHAVIORAL DISTURBANCE, PSYCHOTIC DISTURBANCE, MOOD DISTURBANCE, OR ANXIETY (HCC): ICD-10-CM

## 2023-08-14 PROCEDURE — 97750 PHYSICAL PERFORMANCE TEST: CPT

## 2023-08-14 ASSESSMENT — BALANCE ASSESSMENTS
BALANCE - STANDING DYNAMIC: NORMAL
BALANCE - SITTING STATIC: NORMAL
BALANCE - SITTING DYNAMIC: NORMAL
BALANCE - STANDING STATIC: NORMAL

## 2023-08-14 NOTE — OP THERAPY EVALUATION
Outpatient Occupational Therapy  Pre-Driving Evaluation     [facility] Occupational Therapy [address]  [address]  [facility] [address]  Phone:  [phone]  Fax:  [phone]    Date of Evaluation: 2023    Patient: Derek Guzman  YOB: 1950  MRN: [id number]     Referring Provider: Jp Mitchell M.D.  [address] [place] [address],  [place] [address]   Referring Diagnosis Mild late onset Alzheimer's dementia without behavioral disturbance, psychotic disturbance, mood disturbance, or anxiety (HCC) [G30.1, F02.A0]     Time Calculation    Start time: 0800  Stop time: 1000 Time Calculation (min): 120 minutes             Chief Complaint: Other (OT pre-driving evaluation)    Visit Diagnoses     ICD-10-CM   1. Mild late onset Alzheimer's dementia without behavioral disturbance, psychotic disturbance, mood disturbance, or anxiety (HCC)  G30.1    F02.A0       Subjective    Past Medical History:   Diagnosis Date    Allergic rhinitis 10/01/2009    Allergy     Osteopenia     Pain     plate to left foot    Sigmoid diverticulosis 2016     Past Surgical History:   Procedure Laterality Date    ORIF, FRACTURE, TIBIA Left 2017    Procedure: TIBIA ORIF;  Surgeon: Casey Jewell M.D.;  Location: SURGERY [facility];  Service:     TOE FUSION  2011    Performed by BARTOLOME ROBLES at SURGERY [facility]    BONE GRAFT  2011    Performed by BARTOLOME ROBLES at [facility]    SHOULDER ARTHROSCOPY  2009    right; Performed by RICHARD ELIZONDO at SURGERY SAME DAY [facility]    ROTATOR CUFF REPAIR  2009    Performed by RICHARD ELIZONDO at SURGERY SAME DAY [facility]    MS  DELIVERY ONLY       Social History     Tobacco Use    Smoking status: Former     Packs/day: 0.50     Years: 7.00     Pack years: 3.50     Types: Cigarettes     Quit date: 1978     Years since quittin.6    Smokeless tobacco: Never    Tobacco comments:      avoid all tobacco products   Substance Use Topics    Alcohol use: No     Alcohol/week: 0.0 oz     Family and Occupational History     Socioeconomic History    Marital status:      Spouse name: Not on file    Number of children: Not on file    Years of education: Not on file    Highest education level: 12th grade   Occupational History    Not on file       Objective     Activities of Daily Living:     Household Management:   Vehicle/ Details:     Make: [place]    Model: Kyaw    Year: 2017    Features: automatic and power steering    Comments: Driving Background and Habits:  Client has a current [place] license and is driving. She drives mostly during the day, all weather and uses freeway if she needs to.  Client and her  do not have any concerns regarding her driving ability    Physical Assessment:   Auditory:     Hearing aids: no hearing aid    Hearing problems: no hearing problem    Range of Motion:     Upper extremity (left): within functional limits    Upper extremity (right): within functional limits    Lower extremity (left): within functional limits    Lower extremity (right): within functional limits    Cervical neck (left): within functional limits    Cervical neck (right): within functional limits    Thoracic rotation (left): within functional limits    Thoracic rotation (right): within functional limits    Strength:     Upper extremity (left): within functional limits    Upper extremity (right): within functional limits    Lower extremity (left): within functional limits    Lower extremity (right): within functional limits     (left): within functional limits     (right): within functional limits    Coordination:     Upper extremity (left): within functional limits        Finger to finger: within functional limits    Upper extremity (right): within functional limits        Finger to finger: within functional limits    Lower extremity (left): within functional limits        Heel to  shin: within functional limits    Lower extremity (right): within functional limits        Heel to shin: within functional limits    Sensation:   Upper extremity (left):    Light touch: intact    Proprioception: intact   Upper extremity (right):    Light touch: intact    Proprioception: intact   Lower extremity (left):    Light touch: intact    Proprioception: intact   Lower extremity (right):    Light touch: intact    Proprioception: intact     Balance:     Sitting (static): Normal    Sitting (dynamic): Normal    Standing (static): Normal    Standing (dynamic): Normal    Rapid Pace Walk Test: 7 sec    Cognition:     [facility] Mental Examination (UMS): 22/30    Trail Making Test B: 180 (no errors) sec    Sign Identification: 10/10    Vision:     Last eye exam: 8/14/2022    Previous eye problems: bilateral cataracts    Previous eye treatments: surgery (cataract surgery)    Corrective lens type: reading glasses    Corrective lens used since: about 10 years    Corrective lens used during: daytime and nighttime    Near acuity (Snellen Chart) Binocular: 30    Far acuity (Snellen Chart) Binocular: 30    Contrast sensitivity (day): adequate    Contrast sensitivity (night): diminished    Depth perception: adequate    Color vision: intact    MVPT-3 Visual Closure Subset:        Correct answers: (ex) (A), 22 (B), 23 (A), 24 (B), 25 (C), 26 (B), 27 (D), 28 (A), 29 (D), 30 (C), 31 (D), 32 (A), 33 (C) and 34 (D)        Score: 13/13        Accuracy: 100% correct        Pass/Fail: pass    Additional Physical Assessment Notes:      Full ocular ROM.  Smooth pursuits, saccades, and convergence WNL.  Peripheral vision: 85 degrees each eye for a total of 170 degrees of arc.   Driving Simulator Tasks:   Motor Skills:     Using the accelerator: safe    Using the brake: safe    Using the steering wheel: safe    Reaction time to applying the brake: pass        Comments: 0.8 second    Following distance 3-4 sec rule: pass         Comments: Able to bring initial speed of 55 mph to recommended speed of around 40 mph.  Able to keep a safe distance from the van in front.    Configurable Crash Avoidance Scenarios:     Scenario 1:     Country road, dry and good visibility    Visibility: Slowed for oncoming vehicle  turning left    Pass/Fail: pass      Scenario 2:     Country road,dusk, dry and fair visibility    Visibility: Slowed for tractor and kept a safe distance.  Did not attempt to overtake due to poor visibility of oncoming traffic.    Pass/Fail: pass      Scenario 3:     City road, dry and good visibility    Visibility: Stopped for child running into road from left side    Pass/Fail: pass      Scenario 4:     City road, dry and good visibility    Visibility: Able to safely maneuver around broken down vehicles and back into correct [place].  Able to turn right at stop sign and into correct [place]    Pass/Fail: pass    Dividing and Focusing Attention:     Scenario 1:     Country road, dry and good visibility    Pass/Fail: pass    Comments: Able to stay in [place] and recommended speed.  Saw deer coming from right side and slowed to let it pass.      Scenario 2:     City road, dry and good visibility    Pass/Fail: pass    Comments: Able to maneuver into left [place] and turn left onto road into correct [place].  Stopped for solange walker crossing road.      Free Driving Scenario 1:    Country road, dry and good visibility      Free Driving Scenario 2:    City road, dry and good visibility    Comments: Practice session to acclimate to the road, recommended speed and use of steering wheel, gas and brake pedals    Additional Driving Simulator Comments:     Good use of signals throughout the evaluation.  Recommendation:     Pass/Fail: pass    Recommendation Comments: The objective findings indicate that Mrs. Derek Guzman has the physical, visual, visual-perceptual, and cognitive skills necessary to safely operate a vehicle.  She exhibited adequate reaction  "times and good safety distances with all simulator tasks.  In both rural and city settings where there were mild to moderate distractions, she did not have any accidents in multiple crash avoidance scenarios with pedestrians, animals, vehicles, or stationary objects.   She obeyed all regulatory signs, speed limits, maintained mid-cristiana position at all times, followed all verbal directions, safely passed other vehicles, and was able to divide and focus her attention throughout the driving simulation without difficulty.  Overall, Mrs. Guzman demonstrated good safety awareness, judgement, and anticipatory skills.  Based on her performance today, I recommend she tcontinue driving under the following conditions: during the day, good weather conditions, at low traffic times, on familiar routes, avoidance of the \"spaghetti bowl\" and minimize distractions.  Mr and Mrs Guzman were in full agreement with these recommendations.    Operating a motor vehicle is a privilege in the [facility].  When a medical condition interferes with a person's ability to drive safely, it is the responsibility of the physician to approve driving or revoke the patient's license.  This test was not an on-the-road driving evaluation.  It was intended to identify the physical, visual, perceptual and cognitive deficits that may impair an individual's ability to safely operate a motor vehicle.    Please contact me with any questions regarding this case at [facility] at [phone].  Thank you for this referral and the safety concerns for your patients and others.    Sincerely,    Kimberley Armenta, OTR/L             Time-based treatments/modalities:    Occupational Therapy Timed Treatment Charges  Eval phys performance, minutes (CPT 76141): 120 minutes                  Referring provider co-signature:  I have reviewed this plan of care and my co-signature certifies the need for services.    Certification Period: " 08/14/2023 to  Other 08/14/2023    Physician Signature: ________________________________ Date: ______________

## 2023-10-03 ENCOUNTER — HOSPITAL ENCOUNTER (OUTPATIENT)
Dept: LAB | Facility: MEDICAL CENTER | Age: 73
End: 2023-10-03
Attending: PSYCHIATRY & NEUROLOGY
Payer: MEDICARE

## 2023-10-03 ENCOUNTER — OFFICE VISIT (OUTPATIENT)
Dept: NEUROLOGY | Facility: MEDICAL CENTER | Age: 73
End: 2023-10-03
Attending: PSYCHIATRY & NEUROLOGY
Payer: MEDICARE

## 2023-10-03 VITALS
HEART RATE: 68 BPM | SYSTOLIC BLOOD PRESSURE: 106 MMHG | WEIGHT: 166.67 LBS | BODY MASS INDEX: 28.45 KG/M2 | OXYGEN SATURATION: 97 % | DIASTOLIC BLOOD PRESSURE: 64 MMHG | HEIGHT: 64 IN | TEMPERATURE: 97.8 F

## 2023-10-03 DIAGNOSIS — G30.1 MILD LATE ONSET ALZHEIMER'S DEMENTIA WITHOUT BEHAVIORAL DISTURBANCE, PSYCHOTIC DISTURBANCE, MOOD DISTURBANCE, OR ANXIETY (HCC): ICD-10-CM

## 2023-10-03 DIAGNOSIS — F02.A0 MILD LATE ONSET ALZHEIMER'S DEMENTIA WITHOUT BEHAVIORAL DISTURBANCE, PSYCHOTIC DISTURBANCE, MOOD DISTURBANCE, OR ANXIETY (HCC): ICD-10-CM

## 2023-10-03 LAB
FOLATE SERPL-MCNC: 29.9 NG/ML
VIT B12 SERPL-MCNC: 762 PG/ML (ref 211–911)

## 2023-10-03 PROCEDURE — 3078F DIAST BP <80 MM HG: CPT | Performed by: PSYCHIATRY & NEUROLOGY

## 2023-10-03 PROCEDURE — 82607 VITAMIN B-12: CPT

## 2023-10-03 PROCEDURE — 3074F SYST BP LT 130 MM HG: CPT | Performed by: PSYCHIATRY & NEUROLOGY

## 2023-10-03 PROCEDURE — 99215 OFFICE O/P EST HI 40 MIN: CPT | Performed by: PSYCHIATRY & NEUROLOGY

## 2023-10-03 PROCEDURE — 36415 COLL VENOUS BLD VENIPUNCTURE: CPT

## 2023-10-03 PROCEDURE — 99211 OFF/OP EST MAY X REQ PHY/QHP: CPT | Performed by: PSYCHIATRY & NEUROLOGY

## 2023-10-03 PROCEDURE — 82746 ASSAY OF FOLIC ACID SERUM: CPT

## 2023-10-03 PROCEDURE — 84425 ASSAY OF VITAMIN B-1: CPT

## 2023-10-03 PROCEDURE — 84207 ASSAY OF VITAMIN B-6: CPT

## 2023-10-03 ASSESSMENT — FIBROSIS 4 INDEX: FIB4 SCORE: 1.36

## 2023-10-03 ASSESSMENT — PATIENT HEALTH QUESTIONNAIRE - PHQ9: CLINICAL INTERPRETATION OF PHQ2 SCORE: 0

## 2023-10-03 NOTE — PROGRESS NOTES
"Neurology follow up:      Reason: Mild (late onset) Alzheimer's Disease    Derek Guzman 72 y.o. right handed woman who is here with her . They recently moved from [place] and moved to the [facility] about 1 year ago.  She graduated  and retired from [employer] after working nearly 17 years or so (she did \"intake\" and gather information).     Onset per  around 2  years ago Derek started having issues being easily distracted. Ex: she would get  coffee and see something else that needed to be done and completely forgot what she wanted to do.  She was easily distracted and recognized this issue.   Short term memory has clearly and gradually worsened in the last 6-12 months. She will ask her  \"what are we going to do\" (despite being told what they were going to do.\")> this issue has become more frequent.    Since last visit with me in May 2023 both Derek and  verifies that if she is told something she may forget information within 1-2 minutes.    She will infrequently repeats per herself 2 times per day or she may ask the same question over 3-4 times (when she had to do something with her )> this tends to occur between 10-30 minutes apart.    She continues goes shopping by herself and is able to lode the GPS without getting lost driving. (Note- she passed the OT Guided Driving Simulation on 8/14/2023 at [facility]).  She has been able to follow a shopping list when going to the grocery store ([employer] in [place]).     She has previously taken Prevagen taken for about 8 months> no clear benefit.     No history of stroke events known to have occurred in adult life.  No history of seizure type events or a divya diagnosis of epilepsy.  No history of concussion(s).     No falls or near falls in the last 6 months.     No postural instability evolving or ongoing in the last 6 months.     Denies feeling or being depressed,anxious,nervous,paranoid,delusional or hallucinatory " in the recent 3-6 months ( agreed upon by ).        Average sleep- 7-8 hours most nights in the recent months. NO REM Behavioral symptoms admitted to in recent months. No snoring,gasping,snorting behaviors.           Smoked- early 20(s)- 1/4 pack for 5 years ago.  Minimal alcohol use in adult life.     Mother: probable Alzheimer's Disease (onset in late 50's with subtle onset and progression to  in her early 70s).  Mother's brother:  probable Alz. Disease (onset somewhere early to mid 60s and  just before age 70)        Patient Active Problem List    Diagnosis Date Noted    Mild late onset Alzheimer's dementia without behavioral disturbance, psychotic disturbance, mood disturbance, or anxiety (HCC) 2023    Mixed hyperlipidemia 2023    Elevated LDL cholesterol level 2018    Prediabetes 2018    Osteopenia of multiple sites 2017    BMI 28.0-28.9,adult 2017    Primary osteoarthritis of both hands 2017    Sigmoid diverticulosis 2016    Degenerative disc disease, lumbar 2014    History of melanoma 10/10/2013    History of colonoscopy with polypectomy 2010    Non-seasonal allergic rhinitis 10/01/2009       Past medical history:   Past Medical History:   Diagnosis Date    Allergic rhinitis 10/01/2009    Allergy     Osteopenia     Pain     plate to left foot    Sigmoid diverticulosis 2016       Past surgical history:   Past Surgical History:   Procedure Laterality Date    ORIF, FRACTURE, TIBIA Left 2017    Procedure: TIBIA ORIF;  Surgeon: Casey Jewell M.D.;  Location: [facility];  Service:     TOE FUSION  2011    Performed by BARTOLOME ROBLES at SURGERY [facility]    BONE GRAFT  2011    Performed by BARTOLOME ROBLES at [facility]    SHOULDER ARTHROSCOPY  2009    right; Performed by RICHARD ELIZONDO at SURGERY SAME DAY [facility]    ROTATOR CUFF REPAIR  2009    Performed by  RICHARD ELIZONDO at SURGERY SAME DAY [facility]    AR  DELIVERY ONLY           Social history:   Social History     Socioeconomic History    Marital status:      Spouse name: Not on file    Number of children: Not on file    Years of education: Not on file    Highest education level: 12th grade   Occupational History    Not on file   Tobacco Use    Smoking status: Former     Current packs/day: 0.00     Average packs/day: 0.5 packs/day for 7.0 years (3.5 ttl pk-yrs)     Types: Cigarettes     Start date: 1971     Quit date: 1978     Years since quittin.7    Smokeless tobacco: Never    Tobacco comments:     avoid all tobacco products   Vaping Use    Vaping Use: Never used   Substance and Sexual Activity    Alcohol use: No     Alcohol/week: 0.0 oz    Drug use: No    Sexual activity: Yes     Partners: Male   Other Topics Concern    Not on file   Social History Narrative    Not on file     Social Determinants of Health     Financial Resource Strain: Low Risk  (2023)    Overall Financial Resource Strain (CARDIA)     Difficulty of Paying Living Expenses: Not very hard   Food Insecurity: Food Insecurity Present (2023)    Hunger Vital Sign     Worried About Running Out of Food in the Last Year: Sometimes true     Ran Out of Food in the Last Year: Never true   Transportation Needs: No Transportation Needs (2023)    PRAPARE - Transportation     Lack of Transportation (Medical): No     Lack of Transportation (Non-Medical): No   Physical Activity: Insufficiently Active (2023)    Exercise Vital Sign     Days of Exercise per Week: 6 days     Minutes of Exercise per Session: 10 min   Stress: Stress Concern Present (2023)    [place] [employer] - Occupational Stress Questionnaire     Feeling of Stress : To some extent   Social Connections: Moderately Isolated (2023)    Social Connection and Isolation Panel [NHANES]     Frequency of Communication with  Friends and Family: Twice a week     Frequency of Social Gatherings with Friends and Family: Once a week     Attends Confucianist Services: Never     Active Member of Clubs or Organizations: No     Attends Club or Organization Meetings: Never     Marital Status:    Intimate Partner Violence: Not on file   Housing Stability: Low Risk  (4/11/2023)    Housing Stability Vital Sign     Unable to Pay for Housing in the Last Year: No     Number of Places Lived in the Last Year: 1     Unstable Housing in the Last Year: No       Family history:   Family History   Problem Relation Age of Onset    Colorectal Cancer Mother     Cancer Mother         colon cancer twice, lung cancer    Diabetes Mother     Heart Disease Father         mi    Diabetes Father     Heart Attack Father     No Known Problems Sister     No Known Problems Sister     Diabetes Brother     No Known Problems Brother     No Known Problems Maternal Aunt     No Known Problems Maternal Uncle     No Known Problems Paternal Aunt     No Known Problems Paternal Uncle     No Known Problems Maternal Grandmother     No Known Problems Maternal Grandfather     No Known Problems Paternal Grandmother     No Known Problems Paternal Grandfather     No Known Problems Son     No Known Problems Son          Current medications:   Current Outpatient Medications   Medication    Multiple Vitamin (MULTI-VITAMIN PO)    Ascorbic Acid (VITAMIN C PO)    diphenhydrAMINE (BENADRYL) 25 MG Tab    Calcium Carbonate-Vitamin D (CALCIUM + D PO)     No current facility-administered medications for this visit.       Medication Allergy:  Allergies   Allergen Reactions    Tape Rash     blisters    Trace Metals      Nickel            Physical examination:   Vitals:    10/03/23 1324   BP: 106/64   BP Location: Left arm   Patient Position: Sitting   BP Cuff Size: Large adult   Pulse: 68   Temp: 36.6 °C (97.8 °F)   TempSrc: Temporal   SpO2: 97%   Weight: 75.6 kg (166 lb 10.7 oz)   Height: 1.626 m (5'  "4\")       Normal cephalic atraumatic.  There is full range of movement around the neck in all directions without restrictions or discrete pain evoked triggers.  No lower extremity edema.      Neurological  Exam:      Jefry Cognitive Assessment (MOCA) Version 7.1    Years of Education: 2 years college    TOTAL SCORE:  /30  (to be scanned into the MEDIA section in the E.M.R.)          Mental status: Awake, alert and fully oriented to person, place, time, and situation. Normal attention and concentration.  Did not appear/act combative,irritable,anxious,paranoid/delusional or aggressive to or with me.    Speech and language: Speech is fluent without errors, clear, intact to repetition, and intact to naming.     Follows 3 step motor commands in sequence without significant delay and correctly.    Cranial nerve exam:  II: Pupils are equally round and reactive to light. Visual fields are intact by confrontation.  III, IV, VI: EOMI, no diplopia, no ptosis.  V: Sensation to light touch is normal over V1-3 distributions bilaterally.  .  VII: Facial movements are symmetrical. There is no facial droop. .  VIII: Hearing intact to soft speech and finger rub bilaterally  IX: Palate elevates symmetrically, uvula is midline. Dysarthria is not present.  XI: Shoulder shrug are symmetrical and strong.   XII: Tongue protrudes midline.      Motor exam:  Muscle tone is normal in all 4 limbs. and No abnormal movements appreciated.    Muscle strength:    Neck Flexors/Extensors: 5/5       Right  Left  Deltoid   5/5  5/5      Biceps   5/5  5/5  Triceps              5/5  5/5   Wrist extensors 5/5  5/5  Wrist flexors  5/5  5/5     5/5  5/5  Interossei  5/5  5/5  Thenar (APB)  5/5  5/5   Hip flexors  5/5  5/5  Quadriceps  5/5  5/5    Hamstrings  5/5  5/5  Dorsiflexors  5/5  5/5  Plantarflexors  5/5  5/5  Toe extension  5/5  5/5    Reflexes:       Right  Left  Biceps   2/4  2/4  Triceps              2/4  2/4  Brachioradialis             " 2/4  2/4  Knee jerk  2/4  2/4  Ankle jerk  2/4  2/4     Frontal release signs are absent    bilaterally toes are downgoing to plantar stimulation..    Coordination (finger-to-nose, heel/knee/shin, rapid alternating movements) was normal.     There was no ataxia, no tremors, and no dysmetria.     Station and gait > easily stands up from exam chair without retropulsion,veering,leaning,swaying (to either side).       Labs and Tests:    Vitamin B levels to be done (ordered in May 2023)     [id number] 6:05 PM     HISTORY/REASON FOR EXAM:  Gradual memory decline over 2 years.  Memory and cognitive decline.     TECHNIQUE/EXAM DESCRIPTION:  MRI of the brain without contrast with attention to the temporal lobes.     T1 sagittal, T2 fast spin-echo axial, T1 coronal, FLAIR coronal, diffusion-weighted and apparent diffusion coefficient (ADC map) axial images were obtained of the whole brain. Additional T2 thin-section oblique coronal images were obtained of the   temporal lobes and hippocampal formations.     The study was performed on a [employer] Signa 1.5 Darlin MRI scanner.     COMPARISON:  None.     FINDINGS:     Age related cerebral volume loss. The there is no disproportionate hippocampal or temporal lobe volume loss.  Scattered small T2 hyperintensities in periventricular and subcortical cerebral white matter are nonspecific, most likely minimal chronic microvascular ischemic changes.  No acute intracranial hemorrhage, extra-axial fluid collection, mass effect, midline shift or hydrocephalus. No restricted diffusion to suggest acute infarct.  Posterior fossa structures appear within normal limits. Proximal vascular flow voids are patent.     Paranasal sinuses and mastoids are clear.  Bone marrow signal is normal. Both globes demonstrate prior lens surgery.     IMPRESSION:     1.  Mild age-related generalized cerebral volume loss.  2.  Minimal chronic microvascular ischemic type changes.  3.  No acute  "intracranial abnormality.           Exam Ended: 07/16/23  6:06 PM Last Resulted: 07/16/23  6:10 PM                 Impression/Plans/Recommendations:    Early to Mild Stage of Dementia- probable Alzheimer's Disease with slow or gradual changes in short term memory and additional problems with word retrieval and executive functioning.     There are no features of parkinsonism at this time.       MOCA score today of 18/30 with some difficulties with \"Trails\", slightly slanted cylinder when copied, difficulty placing hands on time given, 6 words produced in 1 minute that begin with B, difficulty with serial 7's (got 1 out of 5).     Global Deterioration Score today of 3-4 range  (per )     Functional Activity Questionnaire score today of  15 (per )> 3's for paying bills, balancing checkbook, remembering appointments and medications).     Today, I reviewed the clinical criteria and reviewed several  scenarios of the differences being using the medical terms of normal brain aging (age associated memory impairment),  Mild Cognitive Impairment (MCI) and Dementia.    Dementia  is a syndrome but statistically and for the majority of patients  occurs due  to a more rapid aging of the brain tissue or potentially from injury to certain parts of one's brain ( often from such contributing factors as  the cumulative effects of alcohol, from one or more ischemic or hemmorhagic stroke(s), from neurodegeneration or long standing with/without suboptimally controlled Hypertension). It is for some of these potential factors as to why I recommend a brain imaging test (Head CT or Brain MRI) be done for the 1st time or in certain circumstances repeated for comparison purposes  as such imaging can suggest one or more factors as to the reason(s) for the person's cognitive/memory changes. In fact, a normal or \"age related\" finding on a brain imaging test in and of itself is useful clinical and objective information to have in the " evaluation of a person who has either an acute, evolving or even chronic (months to years) long cognitive/memory complaint.     Additional factors or contributors to Brain Health issues can be summarized in several books/references which I discussed as well today.      Goals going forwards include:     A. Paying attention to one's risk factors and reducing their prevalence or potential impact on one's changing memory/thinking> an excellent example would be to stop smoking, reduce or eliminate alcohol use (depending on the amount and frequency of usage), maintain good blood pressure control by buying a digital arm blood pressure cuff such as an OMRON Series 3 or 5 and checking one's blood pressure atleast 3 days per week (in the am and early afternoon) that the numbers are under 140/90 and working as needed with the primary care doctor  to optimize blood pressure control).     B. Encouraging proper sleep hygiene which for most adults is 7 to 8 hours of uninterrupted sleep per night.       C. Encouraging some form of exercise preferable aerobic forms to be done (4 to 5 days per week- 30 minutes minimum per day)> 150 minutes per week as a goal. Example activities could include fast walking (up a slight incline), jogging, cycling (road or stationary), swimming,tennis. Essentially, even basic walking on a flat surface or a treadmill would be better than doing nothing.     D. Maintaining or forming new social contacts with family and friends  and a positive attitude despite the concerns and/or ongoing issues with thinking and/or memory.     E. Eating well which means a diet low in salt  (under 2 grams per day), sugar and saturated fat.     F. Maintaining one's BMI (Body Mass Index) under 30.     G. Consideration of the use of cognitive enhancers (acetylcholinerase inhibitors such as Aricept vs an NMDA Receptor agent like Namenda). Pros and cons of such compounds in terms of predicted efficacy and side effects profiles  reviewed. At this time will hold off on such medication(s).     H. Will hold off on a Brain PET Scan after discussion again today options   (pros vs cons).     I. Discussed importance of exercise and the MIND Diet today.     J. Anti amyloid drugs discussed today> Derek and  agree not to pursue them due to risks inherent in using them vs small degree of potential benefit.     K.  Blood work to be done (Vitamin B levels and Vitamin D level)    I have performed  a history and physical exam and a directed /focused  ROS today.    Total time spent today or this patient's care was 40 minutes  and included reviewing  the diagnostic workup to date (such as labs and imaging as well as interpreting such tests relevant to this patient's neurological condition),  reviewing/obtaining separately obtained history (from patient and/or accompanying ffamily member)  for today's neurological problem(s) ,counseling and educating the patient and family member on issues related to cognition/memory and cognitive health factors and documenting  the clinical information in the EMR.    Follow up in 6 months or so with me        Jp Mitchell MD  [facility].   [facility]

## 2023-10-07 ENCOUNTER — DOCUMENTATION (OUTPATIENT)
Dept: NEUROLOGY | Facility: MEDICAL CENTER | Age: 73
End: 2023-10-07
Payer: MEDICARE

## 2023-10-07 DIAGNOSIS — E67.8 EXCESSIVE VITAMIN B6 INTAKE: ICD-10-CM

## 2023-10-07 LAB
VIT B1 BLD-MCNC: 151 NMOL/L (ref 70–180)
VIT B6 SERPL-MCNC: 153.4 NMOL/L (ref 20–125)

## 2024-02-02 ENCOUNTER — DOCUMENTATION (OUTPATIENT)
Dept: NEUROLOGY | Facility: MEDICAL CENTER | Age: 74
End: 2024-02-02
Payer: MEDICARE

## 2024-02-02 DIAGNOSIS — Z85.820 HISTORY OF MELANOMA: ICD-10-CM

## 2024-02-02 DIAGNOSIS — R41.89 COGNITIVE CHANGES: ICD-10-CM

## 2024-02-02 DIAGNOSIS — F02.A0 MILD DEMENTIA ASSOCIATED WITH OTHER UNDERLYING DISEASE, WITHOUT BEHAVIORAL DISTURBANCE, PSYCHOTIC DISTURBANCE, MOOD DISTURBANCE, OR ANXIETY (HCC): Primary | ICD-10-CM

## 2024-02-02 PROBLEM — F03.90 DEMENTIA (HCC): Status: ACTIVE | Noted: 2024-02-02

## 2024-02-20 ENCOUNTER — APPOINTMENT (RX ONLY)
Dept: URBAN - METROPOLITAN AREA CLINIC 6 | Facility: CLINIC | Age: 74
Setting detail: DERMATOLOGY
End: 2024-02-20

## 2024-02-20 DIAGNOSIS — L81.4 OTHER MELANIN HYPERPIGMENTATION: ICD-10-CM

## 2024-02-20 DIAGNOSIS — D22 MELANOCYTIC NEVI: ICD-10-CM

## 2024-02-20 DIAGNOSIS — D18.0 HEMANGIOMA: ICD-10-CM

## 2024-02-20 DIAGNOSIS — Z71.89 OTHER SPECIFIED COUNSELING: ICD-10-CM

## 2024-02-20 DIAGNOSIS — L82.1 OTHER SEBORRHEIC KERATOSIS: ICD-10-CM

## 2024-02-20 DIAGNOSIS — L72.8 OTHER FOLLICULAR CYSTS OF THE SKIN AND SUBCUTANEOUS TISSUE: ICD-10-CM

## 2024-02-20 DIAGNOSIS — Z00.8 ENCOUNTER FOR OTHER GENERAL EXAMINATION: ICD-10-CM

## 2024-02-20 DIAGNOSIS — Z85.820 PERSONAL HISTORY OF MALIGNANT MELANOMA OF SKIN: ICD-10-CM

## 2024-02-20 PROBLEM — D22.5 MELANOCYTIC NEVI OF TRUNK: Status: ACTIVE | Noted: 2024-02-20

## 2024-02-20 PROBLEM — D18.01 HEMANGIOMA OF SKIN AND SUBCUTANEOUS TISSUE: Status: ACTIVE | Noted: 2024-02-20

## 2024-02-20 PROCEDURE — ? SUNSCREEN TREATMENT REGIMEN

## 2024-02-20 PROCEDURE — ? SUNSCREEN RECOMMENDATIONS

## 2024-02-20 PROCEDURE — ? COUNSELING

## 2024-02-20 PROCEDURE — ? REFERRAL CORRESPONDENCE

## 2024-02-20 PROCEDURE — 99213 OFFICE O/P EST LOW 20 MIN: CPT

## 2024-02-20 ASSESSMENT — LOCATION SIMPLE DESCRIPTION DERM
LOCATION SIMPLE: LEFT HAND
LOCATION SIMPLE: GROIN
LOCATION SIMPLE: UPPER BACK
LOCATION SIMPLE: RIGHT HAND
LOCATION SIMPLE: ABDOMEN
LOCATION SIMPLE: LEFT FOREHEAD
LOCATION SIMPLE: CHEST
LOCATION SIMPLE: LEFT FOREARM

## 2024-02-20 ASSESSMENT — LOCATION DETAILED DESCRIPTION DERM
LOCATION DETAILED: LEFT RADIAL DORSAL HAND
LOCATION DETAILED: SUPERIOR THORACIC SPINE
LOCATION DETAILED: LEFT PROXIMAL DORSAL FOREARM
LOCATION DETAILED: LEFT INFERIOR FOREHEAD
LOCATION DETAILED: PERIUMBILICAL SKIN
LOCATION DETAILED: RIGHT RADIAL DORSAL HAND
LOCATION DETAILED: MIDDLE STERNUM
LOCATION DETAILED: EPIGASTRIC SKIN
LOCATION DETAILED: RIGHT SUPRAPUBIC SKIN

## 2024-02-20 ASSESSMENT — LOCATION ZONE DERM
LOCATION ZONE: FACE
LOCATION ZONE: ARM
LOCATION ZONE: TRUNK
LOCATION ZONE: HAND

## 2024-03-31 ENCOUNTER — HOSPITAL ENCOUNTER (OUTPATIENT)
Dept: RADIOLOGY | Facility: MEDICAL CENTER | Age: 74
End: 2024-03-31
Attending: PSYCHIATRY & NEUROLOGY
Payer: MEDICARE

## 2024-03-31 DIAGNOSIS — Z85.820 HISTORY OF MELANOMA: ICD-10-CM

## 2024-03-31 DIAGNOSIS — R41.89 COGNITIVE CHANGES: ICD-10-CM

## 2024-03-31 DIAGNOSIS — F02.A0 MILD DEMENTIA ASSOCIATED WITH OTHER UNDERLYING DISEASE, WITHOUT BEHAVIORAL DISTURBANCE, PSYCHOTIC DISTURBANCE, MOOD DISTURBANCE, OR ANXIETY (HCC): ICD-10-CM

## 2024-03-31 PROCEDURE — 70553 MRI BRAIN STEM W/O & W/DYE: CPT

## 2024-03-31 PROCEDURE — 700117 HCHG RX CONTRAST REV CODE 255: Performed by: PSYCHIATRY & NEUROLOGY

## 2024-03-31 PROCEDURE — A9579 GAD-BASE MR CONTRAST NOS,1ML: HCPCS | Performed by: PSYCHIATRY & NEUROLOGY

## 2024-03-31 RX ADMIN — GADOTERIDOL 15 ML: 279.3 INJECTION, SOLUTION INTRAVENOUS at 17:58

## 2024-04-05 ENCOUNTER — TELEPHONE (OUTPATIENT)
Dept: NEUROLOGY | Facility: MEDICAL CENTER | Age: 74
End: 2024-04-05
Payer: MEDICARE

## 2024-04-05 NOTE — TELEPHONE ENCOUNTER

## 2024-04-18 SDOH — HEALTH STABILITY: PHYSICAL HEALTH: ON AVERAGE, HOW MANY MINUTES DO YOU ENGAGE IN EXERCISE AT THIS LEVEL?: 10 MIN

## 2024-04-18 SDOH — ECONOMIC STABILITY: INCOME INSECURITY: IN THE LAST 12 MONTHS, WAS THERE A TIME WHEN YOU WERE NOT ABLE TO PAY THE MORTGAGE OR RENT ON TIME?: NO

## 2024-04-18 SDOH — ECONOMIC STABILITY: FOOD INSECURITY: WITHIN THE PAST 12 MONTHS, THE FOOD YOU BOUGHT JUST DIDN'T LAST AND YOU DIDN'T HAVE MONEY TO GET MORE.: NEVER TRUE

## 2024-04-18 SDOH — HEALTH STABILITY: PHYSICAL HEALTH: ON AVERAGE, HOW MANY DAYS PER WEEK DO YOU ENGAGE IN MODERATE TO STRENUOUS EXERCISE (LIKE A BRISK WALK)?: 2 DAYS

## 2024-04-18 SDOH — ECONOMIC STABILITY: HOUSING INSECURITY: IN THE LAST 12 MONTHS, HOW MANY PLACES HAVE YOU LIVED?: 2

## 2024-04-18 SDOH — HEALTH STABILITY: MENTAL HEALTH
STRESS IS WHEN SOMEONE FEELS TENSE, NERVOUS, ANXIOUS, OR CAN'T SLEEP AT NIGHT BECAUSE THEIR MIND IS TROUBLED. HOW STRESSED ARE YOU?: ONLY A LITTLE

## 2024-04-18 SDOH — ECONOMIC STABILITY: INCOME INSECURITY: HOW HARD IS IT FOR YOU TO PAY FOR THE VERY BASICS LIKE FOOD, HOUSING, MEDICAL CARE, AND HEATING?: NOT VERY HARD

## 2024-04-18 SDOH — ECONOMIC STABILITY: FOOD INSECURITY: WITHIN THE PAST 12 MONTHS, YOU WORRIED THAT YOUR FOOD WOULD RUN OUT BEFORE YOU GOT MONEY TO BUY MORE.: NEVER TRUE

## 2024-04-18 ASSESSMENT — SOCIAL DETERMINANTS OF HEALTH (SDOH)
IN A TYPICAL WEEK, HOW MANY TIMES DO YOU TALK ON THE PHONE WITH FAMILY, FRIENDS, OR NEIGHBORS?: ONCE A WEEK
DO YOU BELONG TO ANY CLUBS OR ORGANIZATIONS SUCH AS CHURCH GROUPS UNIONS, FRATERNAL OR ATHLETIC GROUPS, OR SCHOOL GROUPS?: NO
HOW HARD IS IT FOR YOU TO PAY FOR THE VERY BASICS LIKE FOOD, HOUSING, MEDICAL CARE, AND HEATING?: NOT VERY HARD
WITHIN THE PAST 12 MONTHS, YOU WORRIED THAT YOUR FOOD WOULD RUN OUT BEFORE YOU GOT THE MONEY TO BUY MORE: NEVER TRUE
DO YOU BELONG TO ANY CLUBS OR ORGANIZATIONS SUCH AS CHURCH GROUPS UNIONS, FRATERNAL OR ATHLETIC GROUPS, OR SCHOOL GROUPS?: NO
HOW OFTEN DO YOU HAVE SIX OR MORE DRINKS ON ONE OCCASION: NEVER
IN A TYPICAL WEEK, HOW MANY TIMES DO YOU TALK ON THE PHONE WITH FAMILY, FRIENDS, OR NEIGHBORS?: ONCE A WEEK
HOW OFTEN DO YOU GET TOGETHER WITH FRIENDS OR RELATIVES?: MORE THAN THREE TIMES A WEEK
HOW MANY DRINKS CONTAINING ALCOHOL DO YOU HAVE ON A TYPICAL DAY WHEN YOU ARE DRINKING: PATIENT DOES NOT DRINK
HOW OFTEN DO YOU GET TOGETHER WITH FRIENDS OR RELATIVES?: MORE THAN THREE TIMES A WEEK
HOW OFTEN DO YOU ATTEND CHURCH OR RELIGIOUS SERVICES?: NEVER
HOW OFTEN DO YOU ATTEND CHURCH OR RELIGIOUS SERVICES?: NEVER
HOW OFTEN DO YOU ATTENT MEETINGS OF THE CLUB OR ORGANIZATION YOU BELONG TO?: NEVER
HOW OFTEN DO YOU ATTENT MEETINGS OF THE CLUB OR ORGANIZATION YOU BELONG TO?: NEVER

## 2024-04-18 ASSESSMENT — LIFESTYLE VARIABLES
HOW MANY STANDARD DRINKS CONTAINING ALCOHOL DO YOU HAVE ON A TYPICAL DAY: PATIENT DOES NOT DRINK
HOW OFTEN DO YOU HAVE SIX OR MORE DRINKS ON ONE OCCASION: NEVER

## 2024-04-22 ENCOUNTER — OFFICE VISIT (OUTPATIENT)
Dept: NEUROLOGY | Facility: MEDICAL CENTER | Age: 74
End: 2024-04-22
Attending: PSYCHIATRY & NEUROLOGY
Payer: MEDICARE

## 2024-04-22 VITALS
BODY MASS INDEX: 28.15 KG/M2 | HEART RATE: 61 BPM | DIASTOLIC BLOOD PRESSURE: 78 MMHG | WEIGHT: 164.9 LBS | SYSTOLIC BLOOD PRESSURE: 128 MMHG | RESPIRATION RATE: 12 BRPM | TEMPERATURE: 98.3 F | OXYGEN SATURATION: 100 % | HEIGHT: 64 IN

## 2024-04-22 DIAGNOSIS — G30.1 MILD LATE ONSET ALZHEIMER'S DEMENTIA WITHOUT BEHAVIORAL DISTURBANCE, PSYCHOTIC DISTURBANCE, MOOD DISTURBANCE, OR ANXIETY (HCC): ICD-10-CM

## 2024-04-22 DIAGNOSIS — F02.A0 MILD LATE ONSET ALZHEIMER'S DEMENTIA WITHOUT BEHAVIORAL DISTURBANCE, PSYCHOTIC DISTURBANCE, MOOD DISTURBANCE, OR ANXIETY (HCC): ICD-10-CM

## 2024-04-22 PROCEDURE — 3078F DIAST BP <80 MM HG: CPT | Performed by: PSYCHIATRY & NEUROLOGY

## 2024-04-22 PROCEDURE — 99214 OFFICE O/P EST MOD 30 MIN: CPT | Performed by: PSYCHIATRY & NEUROLOGY

## 2024-04-22 PROCEDURE — 99211 OFF/OP EST MAY X REQ PHY/QHP: CPT | Performed by: PSYCHIATRY & NEUROLOGY

## 2024-04-22 PROCEDURE — 3074F SYST BP LT 130 MM HG: CPT | Performed by: PSYCHIATRY & NEUROLOGY

## 2024-04-22 ASSESSMENT — ANXIETY QUESTIONNAIRES
GAD7 TOTAL SCORE: 2
2. NOT BEING ABLE TO STOP OR CONTROL WORRYING: NOT AT ALL
IF YOU CHECKED OFF ANY PROBLEMS ON THIS QUESTIONNAIRE, HOW DIFFICULT HAVE THESE PROBLEMS MADE IT FOR YOU TO DO YOUR WORK, TAKE CARE OF THINGS AT HOME, OR GET ALONG WITH OTHER PEOPLE: NOT DIFFICULT AT ALL
5. BEING SO RESTLESS THAT IT IS HARD TO SIT STILL: NOT AT ALL
3. WORRYING TOO MUCH ABOUT DIFFERENT THINGS: SEVERAL DAYS
7. FEELING AFRAID AS IF SOMETHING AWFUL MIGHT HAPPEN: NOT AT ALL
6. BECOMING EASILY ANNOYED OR IRRITABLE: NOT AT ALL
1. FEELING NERVOUS, ANXIOUS, OR ON EDGE: SEVERAL DAYS
4. TROUBLE RELAXING: NOT AT ALL

## 2024-04-22 ASSESSMENT — MONTREAL COGNITIVE ASSESSMENT (MOCA)
1. ALTERNATING TRAIL MAKING: 0/1
WHAT IS THE TOTAL SCORE (OUT OF 30): 15
4. NAME EACH OF THE THREE ANIMALS SHOWN: 2/3
3. DRAW A CLOCK: CONTOUR, NUMBERS, HANDS: 2/3
11. FOR EACH PAIR OF WORDS, WHAT CATEGORY DO THEY BELONG TO (OUT OF 2): 1/2
WHAT IS THE VERSION OF MOCA ADMINISTERED: 8.1
5. MEMORY TRIALS: SECOND TRIAL
6. READ LIST OF DIGITS [FORWARD/BACKWARD]: 2/2
9. REPEAT EACH SENTENCE: 1/2
DELAYED RECALL SUBSCORE: 0/5
7. [VIGILENCE] TAP WHEN HEARING DESIGNATED LETTER: 1/1
10. [FLUENCY] NAME WORDS STARTING WITH DESIGNATED LETTER: 0/1
2. COPY DRAWING: 0/1
8. SERIAL SUBTRACTION OF 7S: 1/5
ORIENTATION SUBSCORE: 5/6
CATEGORY CUE (IF APPLICABLE): 4

## 2024-04-22 ASSESSMENT — PATIENT HEALTH QUESTIONNAIRE - PHQ9: CLINICAL INTERPRETATION OF PHQ2 SCORE: 0

## 2024-04-22 ASSESSMENT — FIBROSIS 4 INDEX: FIB4 SCORE: 1.38

## 2024-04-22 NOTE — PROGRESS NOTES
"Neuro follow up:    Here with .    10/3/2023 last visit with me.    Reason: Mild (late onset) Alzheimer's Disease     Derek Guzman 73 y.o. right handed woman who is here with her . They recently moved from [place] and moved to the [facility] about 1 year ago.  She graduated HS and retired from [employer] after working nearly 17 years or so (she did \"intake\" and gather information).     Onset per  around 2  years ago Derek started having issues being easily distracted. Ex: she would get  coffee and see something else that needed to be done and completely forgot what she wanted to do.  She was easily distracted and recognized this issue.   Short term memory has clearly and gradually worsened in the last 6-12 months. She will ask her  \"what are we going to do\" (despite being told what they were going to do.\")> this issue has become more frequent.     Since last visit with me in 10/2023    has noticed since last visit with me that if she is easily and minorly distracted by another person such that this issue occurs daily> example> being asked to prepare coffee and then someone like the grand children asking her a brief question> she would forgot that she was intending or planning to make coffee. This has been a consistent newer problems.     She will infrequently repeats per herself 2 times per day or she may ask the same question over 3-4 times (when she had to do something with her )> this tends to occur between 10-30 minutes apart.     She continues goes shopping by herself and is able to lode the GPS without getting lost driving. (Note- she passed the OT Guided Driving Simulation on 8/14/2023 at [facility]).  She has been able to follow a shopping list when going to the grocery store ([email or web address] in [place]).     She has previously taken Prevagen taken for about 8 months> no clear benefit.     No history of stroke events known to have occurred in adult " life.  No history of seizure type events or a divya diagnosis of epilepsy.  No history of concussion(s).     No falls or near falls in the last 6 months.     No postural instability evolving or ongoing in the last 6 months.     Denies feeling or being depressed,anxious,nervous,paranoid,delusional or hallucinatory in the recent 6 months ( agreed upon by ).        Average sleep- 7-8 hours most nights in the recent months. NO REM Behavioral symptoms admitted to in recent months. No snoring,gasping,snorting behaviors.           Smoked- early (s)- 1/4 pack for 5 years ago.  Minimal alcohol use in adult life.     Mother: probable Alzheimer's Disease (onset in late 50's with subtle onset and progression to  in her early 70s).  Mother's brother:  probable Alz. Disease (onset somewhere early to mid 60s and  just before age 70)          Patient Active Problem List    Diagnosis Date Noted    Dementia ([facility]) 2024    Cognitive changes 2024    Mild late onset Alzheimer's dementia without behavioral disturbance, psychotic disturbance, mood disturbance, or anxiety ([facility]) 2023    Mixed hyperlipidemia 2023    Elevated LDL cholesterol level 2018    Prediabetes 2018    Osteopenia of multiple sites 2017    BMI 28.0-28.9,adult 2017    Primary osteoarthritis of both hands 2017    Sigmoid diverticulosis 2016    Degenerative disc disease, lumbar 2014    History of melanoma 10/10/2013    History of colonoscopy with polypectomy 2010    Non-seasonal allergic rhinitis 10/01/2009       Past medical history:   Past Medical History:   Diagnosis Date    Allergic rhinitis 10/01/2009    Allergy     Osteopenia     Pain     plate to left foot    Sigmoid diverticulosis 2016       Past surgical history:   Past Surgical History:   Procedure Laterality Date    ORIF, FRACTURE, TIBIA Left 2017    Procedure: TIBIA ORIF;  Surgeon: Casey Jewell M.D.;  Location:  SURGERY [facility] ORS;  Service:     TOE FUSION  2011    Performed by BARTOLOME ROBLES at SURGERY [facility]    BONE GRAFT  2011    Performed by BARTOLOME ROBLES at SURGERY [facility] ORS    SHOULDER ARTHROSCOPY  2009    right; Performed by RICHARD ELIZONDO at SURGERY [facility] DAY [facility]    ROTATOR CUFF REPAIR  2009    [facility]    AL  DELIVERY ONLY           Social history:   Social History     Socioeconomic History    Marital status:      Spouse name: Not on file    Number of children: Not on file    Years of education: Not on file    Highest education level: Associate degree: occupational, technical, or vocational program   Occupational History    Not on file   Tobacco Use    Smoking status: Former     Current packs/day: 0.00     Average packs/day: 0.5 packs/day for 7.0 years (3.5 ttl pk-yrs)     Types: Cigarettes     Start date: 1971     Quit date: 1978     Years since quittin.3    Smokeless tobacco: Never    Tobacco comments:     avoid all tobacco products   Vaping Use    Vaping Use: Never used   Substance and Sexual Activity    Alcohol use: No     Alcohol/week: 0.0 oz    Drug use: No    Sexual activity: Yes     Partners: Male   Other Topics Concern    Not on file   Social History Narrative    Not on file     Social Determinants of Health     Financial Resource Strain: Low Risk  (2024)    Overall Financial Resource Strain (CARDIA)     Difficulty of Paying Living Expenses: Not very hard   Food Insecurity: No Food Insecurity (2024)    Hunger Vital Sign     Worried About Running Out of Food in the Last Year: Never true     Ran Out of Food in the Last Year: Never true   Transportation Needs: No Transportation Needs (2024)    PRAPARE - Transportation     Lack of Transportation (Medical): No     Lack of Transportation (Non-Medical): No   Physical Activity: Insufficiently Active  (4/18/2024)    Exercise Vital Sign     Days of Exercise per Week: 2 days     Minutes of Exercise per Session: 10 min   Stress: No Stress Concern Present (4/18/2024)    [employer] [place] [employer] - Occupational Stress Questionnaire     Feeling of Stress : Only a little   Social Connections: Moderately Isolated (4/18/2024)    Social Connection and Isolation Panel [NHANES]     Frequency of Communication with Friends and Family: Once a week     Frequency of Social Gatherings with Friends and Family: More than three times a week     Attends Jehovah's witness Services: Never     Active Member of Clubs or Organizations: No     Attends Club or Organization Meetings: Never     Marital Status:    Intimate Partner Violence: Not on file   Housing Stability: Low Risk  (4/18/2024)    Housing Stability Vital Sign     Unable to Pay for Housing in the Last Year: No     Number of Places Lived in the Last Year: 2     Unstable Housing in the Last Year: No       Family history:   Family History   Problem Relation Age of Onset    Colorectal Cancer Mother     Cancer Mother         colon cancer twice, lung cancer    Diabetes Mother     Heart Disease Father         mi    Diabetes Father     Heart Attack Father     No Known Problems Sister     No Known Problems Sister     Diabetes Brother     No Known Problems Brother     No Known Problems Maternal Aunt     No Known Problems Maternal Uncle     No Known Problems Paternal Aunt     No Known Problems Paternal Uncle     No Known Problems Maternal Grandmother     No Known Problems Maternal Grandfather     No Known Problems Paternal Grandmother     No Known Problems Paternal Grandfather     No Known Problems Son     No Known Problems Son          Current medications:   Current Outpatient Medications   Medication    Multiple Vitamin (MULTI-VITAMIN PO)    Ascorbic Acid (VITAMIN C PO)    diphenhydrAMINE (BENADRYL) 25 MG Tab    Calcium Carbonate-Vitamin D (CALCIUM + D PO)     No current  "facility-administered medications for this visit.       Medication Allergy:  Allergies   Allergen Reactions    Tape Rash     blisters    Trace Metals      Nickel            Physical examination:   Vitals:    04/22/24 1036   BP: 128/78   BP Location: Right arm   Patient Position: Sitting   BP Cuff Size: Adult   Pulse: 61   Resp: 12   Temp: 36.8 °C (98.3 °F)   TempSrc: Temporal   SpO2: 100%   Weight: 74.8 kg (164 lb 14.5 oz)   Height: 1.626 m (5' 4\")       Normal cephalic atraumatic.  There is full range of movement around the neck in all directions without restrictions or discrete pain evoked triggers.  No lower extremity edema.      Neurological  Exam:      Jefry Cognitive Assessment (MOCA) Version 7.1    Years of Education:2  years college    TOTAL SCORE: 15/30  (to be scanned into the MEDIA section in the E.M.R.)          Mental status: Awake, alert and fully oriented to person, place, time, and situation.  (Except year  \"2022 or 2072\" Normal attention and concentration.  Did not appear/act combative,irritable,anxious,paranoid/delusional or aggressive to or with me.    Speech and language: Speech is fluent without errors, clear, intact to repetition, and intact to naming.     Follows 3 step motor commands in sequence without significant delay and correctly.    Cranial nerve exam:  II: Pupils are equally round and reactive to light. Visual fields are intact by confrontation.  III, IV, VI: EOMI, no diplopia, no ptosis.  V: Sensation to light touch is normal over V1-3 distributions bilaterally.  .  VII: Facial movements are symmetrical. There is no facial droop. .  VIII: Hearing intact to soft speech and finger rub bilaterally  IX: Palate elevates symmetrically, uvula is midline. Dysarthria is not present.  XI: Shoulder shrug are symmetrical and strong.   XII: Tongue protrudes midline.      Motor exam:  Muscle tone is normal in all 4 limbs. and No abnormal movements appreciated.    Muscle strength:    Neck " Flexors/Extensors: 5/5       Right  Left  Deltoid   5/5  5/5      Biceps   5/5  5/5  Triceps  5/5  5/5   Wrist extensors 5/5  5/5  Wrist flexors  5/5  5/5     5/5  5/5  Interossei  5/5  5/5  Thenar (APB)  5/5  5/5   Hip flexors  5/5  5/5  Quadriceps  5/5  5/5    Hamstrings  5/5  5/5  Dorsiflexors  5/5  5/5  Plantarflexors  5/5  5/5  Toe extension  5/5  5/5         Right  Left  Biceps   2/4  2/4  Triceps             2/4  2/4  Brachioradialis 2/4  2/4  Knee jerk  2/4  2/4  Ankle jerk  2/4  2/4     Frontal release signs are absent    bilaterally toes are downgoing to plantar stimulation..    Coordination (finger-to-nose, heel/knee/shin, rapid alternating movements) was normal.     There was no ataxia, no tremors, and no dysmetria.     Station and gait > easily stands up from exam chair without retropulsion,veering,leaning,swaying (to either side).          3/31/2024 5:30 PM     HISTORY/REASON FOR EXAM:  Progressive Dementia In Last 6 Months; Please Compare To Prior Brain Mri From July 2023.        TECHNIQUE/EXAM DESCRIPTION:   MRI of the brain without and with contrast.     T1 sagittal, T2 fast spin-echo axial, T1 coronal, FLAIR coronal, diffusion-weighted and apparent diffusion coefficient (ADC map) axial images were obtained of the whole brain. T1 postcontrast axial and T1 postcontrast coronal images were obtained.     The study was performed on a Revizera 1.5 Darlin MRI scanner.     15 mL ProHance contrast was administered intravenously.     COMPARISON:  MRI scan of the brain 7/16/2023     FINDINGS:  The calvariae are unremarkable. There are no extra-axial fluid collections. The ventricular system and basal cisterns are mild to moderately prominent. There is mild-to-moderate prominence of the cortical sulci and gyri. There are there are a few   scattered punctate areas of increased T2 signal intensity in the periventricular and juxtacortical white matter. There are no mass effects or shift of midline  structures. There are no hemorrhagic lesions. The diffusion-weighted axial images show no   evidence of acute cerebral infarction.     The postcontrast images show no areas of abnormal parenchymal or meningeal enhancement.     The brainstem and posterior fossa structures are unremarkable.     Vascular flow voids in the vertebrobasilar and carotid arteries, [place], and dural venous sinuses are intact.     The paranasal sinuses and mastoids in the field of view are unremarkable.     IMPRESSION:     1.  Age-related cerebral atrophy.     2.  Minimal periventricular ducts cortical white matter changes consistent with chronic microvascular ischemic gliosis.     3.  No significant interval change from previous exam of 7/16/2023           Exam Ended: 03/31/24  6:23 PM Last Resulted: 04/01/24  7:58 AM               Impression:      Early to Mild Stage of Dementia- probable Alzheimer's Disease with slow or gradual changes in short term memory and additional problems with word retrieval and executive functioning.     There are no features of parkinsonism at this time.         MOCA score today of 15/30 > see media section     The Alzheimer's Questionnaire Score of 20   (per )> this score is consistent with Alzheimer's.      Functional Activity Questionnaire score today of  19  (per )> 2's for paying bills, balancing checkbook, remembering appointments and medications).       Today, I reviewed the clinical criteria and reviewed several  scenarios of the differences being using the medical terms of normal brain aging (age associated memory impairment),  Mild Cognitive Impairment (MCI) and Dementia.    Dementia  is a syndrome but statistically and for the majority of patients  occurs due  to a more rapid aging of the brain tissue or potentially from injury to certain parts of one's brain ( often from such contributing factors as  the cumulative effects of alcohol, from one or more ischemic or hemmorhagic  "stroke(s), from neurodegeneration or long standing with/without suboptimally controlled Hypertension). It is for some of these potential factors as to why I recommend a brain imaging test (Head CT or Brain MRI) be done for the 1st time or in certain circumstances repeated for comparison purposes  as such imaging can suggest one or more factors as to the reason(s) for the person's cognitive/memory changes. In fact, a normal or \"age related\" finding on a brain imaging test in and of itself is useful clinical and objective information to have in the evaluation of a person who has either an acute, evolving or even chronic (months to years) long cognitive/memory complaint.     Additional factors or contributors to Brain Health issues can be summarized in several books/references which I discussed as well today.      Goals going forwards include:     A. Paying attention to one's risk factors and reducing their prevalence or potential impact on one's changing memory/thinking> an excellent example would be to stop smoking, reduce or eliminate alcohol use (depending on the amount and frequency of usage), maintain good blood pressure control by buying a digital arm blood pressure cuff such as an OMRON Series 3 or 5 and checking one's blood pressure atleast 3 days per week (in the am and early afternoon) that the numbers are under 140/90 and working as needed with the primary care doctor  to optimize blood pressure control).     B. Encouraging proper sleep hygiene which for most adults is 7 to 8 hours of uninterrupted sleep per night.       C. Encouraging some form of exercise preferable aerobic forms to be done (4 to 5 days per week- 30 minutes minimum per day)> 150 minutes per week as a goal. Example activities could include fast walking (up a slight incline), jogging, cycling (road or stationary), swimming,tennis. Essentially, even basic walking on a flat surface or a treadmill would be better than doing nothing.     D. " Maintaining or forming new social contacts with family and friends  and a positive attitude despite the concerns and/or ongoing issues with thinking and/or memory.     E. Eating well which means a diet low in salt  (under 2 grams per day), sugar and saturated fat.     F. Maintaining one's BMI (Body Mass Index) under 30.     G. Consideration of the use of cognitive enhancers (acetylcholinerase inhibitors such as Aricept vs an NMDA Receptor agent like Namenda). Pros and cons of such compounds in terms of predicted efficacy and side effects profiles reviewed.     At this time will hold off on such medication(s).     H. Will hold off on doing a  Brain PET Scan after discussion again today options   (pros vs cons).     I. Discussed importance of exercise and the MIND Diet today.     J. Anti amyloid drugs discussed today> Derek and  agree not to pursue them due to risks inherent in using them vs small degree of potential benefit.     K.  Blood work to be done > repeat Vitamin levels.    L. Caregiver assistance discussed today with . He did not feel that he needed this assistance at this time.    M. Driving Assessment passed in August 2023;  has been in the car with her recently and he feels very comfortable about her driving abilities. She drives very little overall and typically goes to the grocery store that is 6 miles from her house (never getting lost to get there).     I have performed  a history and physical exam and a directed /focused  ROS today.     Total time spent today or this patient's care was 36 minutes  and included reviewing  the diagnostic workup to date (such as labs and imaging as well as interpreting such tests relevant to this patient's neurological condition),  reviewing/obtaining separately obtained history (from patient and/or accompanying )  for today's neurological problem(s) ,counseling and educating the patient and family member on issues related to cognition/memory  and cognitive health factors and documenting  the clinical information in the EMR.     Follow up in 6 months or so with me

## 2024-04-24 ENCOUNTER — APPOINTMENT (OUTPATIENT)
Dept: MEDICAL GROUP | Facility: PHYSICIAN GROUP | Age: 74
End: 2024-04-24
Payer: MEDICARE

## 2024-05-21 ENCOUNTER — DOCUMENTATION (OUTPATIENT)
Dept: NEUROLOGY | Facility: MEDICAL CENTER | Age: 74
End: 2024-05-21
Payer: MEDICARE

## 2024-05-21 ENCOUNTER — HOSPITAL ENCOUNTER (OUTPATIENT)
Dept: LAB | Facility: MEDICAL CENTER | Age: 74
End: 2024-05-21
Attending: PSYCHIATRY & NEUROLOGY
Payer: MEDICARE

## 2024-05-21 DIAGNOSIS — F02.A0 MILD DEMENTIA ASSOCIATED WITH OTHER UNDERLYING DISEASE, WITHOUT BEHAVIORAL DISTURBANCE, PSYCHOTIC DISTURBANCE, MOOD DISTURBANCE, OR ANXIETY (HCC): ICD-10-CM

## 2024-05-21 DIAGNOSIS — G30.1 MILD LATE ONSET ALZHEIMER'S DEMENTIA WITHOUT BEHAVIORAL DISTURBANCE, PSYCHOTIC DISTURBANCE, MOOD DISTURBANCE, OR ANXIETY (HCC): ICD-10-CM

## 2024-05-21 DIAGNOSIS — F02.A0 MILD LATE ONSET ALZHEIMER'S DEMENTIA WITHOUT BEHAVIORAL DISTURBANCE, PSYCHOTIC DISTURBANCE, MOOD DISTURBANCE, OR ANXIETY (HCC): ICD-10-CM

## 2024-05-21 DIAGNOSIS — D72.819 LEUKOPENIA, UNSPECIFIED TYPE: ICD-10-CM

## 2024-05-21 LAB
25(OH)D3 SERPL-MCNC: 50 NG/ML (ref 30–100)
ALBUMIN SERPL BCP-MCNC: 3.9 G/DL (ref 3.2–4.9)
ALBUMIN/GLOB SERPL: 1.6 G/DL
ALP SERPL-CCNC: 69 U/L (ref 30–99)
ALT SERPL-CCNC: 32 U/L (ref 2–50)
ANION GAP SERPL CALC-SCNC: 11 MMOL/L (ref 7–16)
AST SERPL-CCNC: 33 U/L (ref 12–45)
BASOPHILS # BLD AUTO: 0.9 % (ref 0–1.8)
BASOPHILS # BLD: 0.04 K/UL (ref 0–0.12)
BILIRUB SERPL-MCNC: 0.4 MG/DL (ref 0.1–1.5)
BUN SERPL-MCNC: 21 MG/DL (ref 8–22)
CALCIUM ALBUM COR SERPL-MCNC: 9.5 MG/DL (ref 8.5–10.5)
CALCIUM SERPL-MCNC: 9.4 MG/DL (ref 8.5–10.5)
CHLORIDE SERPL-SCNC: 107 MMOL/L (ref 96–112)
CO2 SERPL-SCNC: 25 MMOL/L (ref 20–33)
CREAT SERPL-MCNC: 0.74 MG/DL (ref 0.5–1.4)
EOSINOPHIL # BLD AUTO: 0.22 K/UL (ref 0–0.51)
EOSINOPHIL NFR BLD: 4.9 % (ref 0–6.9)
ERYTHROCYTE [DISTWIDTH] IN BLOOD BY AUTOMATED COUNT: 46.2 FL (ref 35.9–50)
FOLATE SERPL-MCNC: 27.4 NG/ML
GFR SERPLBLD CREATININE-BSD FMLA CKD-EPI: 86 ML/MIN/1.73 M 2
GLOBULIN SER CALC-MCNC: 2.4 G/DL (ref 1.9–3.5)
GLUCOSE SERPL-MCNC: 87 MG/DL (ref 65–99)
HCT VFR BLD AUTO: 44.2 % (ref 37–47)
HGB BLD-MCNC: 14.7 G/DL (ref 12–16)
IMM GRANULOCYTES # BLD AUTO: 0.01 K/UL (ref 0–0.11)
IMM GRANULOCYTES NFR BLD AUTO: 0.2 % (ref 0–0.9)
LYMPHOCYTES # BLD AUTO: 1.51 K/UL (ref 1–4.8)
LYMPHOCYTES NFR BLD: 33.9 % (ref 22–41)
MCH RBC QN AUTO: 31.3 PG (ref 27–33)
MCHC RBC AUTO-ENTMCNC: 33.3 G/DL (ref 32.2–35.5)
MCV RBC AUTO: 94.2 FL (ref 81.4–97.8)
MONOCYTES # BLD AUTO: 0.4 K/UL (ref 0–0.85)
MONOCYTES NFR BLD AUTO: 9 % (ref 0–13.4)
NEUTROPHILS # BLD AUTO: 2.28 K/UL (ref 1.82–7.42)
NEUTROPHILS NFR BLD: 51.1 % (ref 44–72)
NRBC # BLD AUTO: 0 K/UL
NRBC BLD-RTO: 0 /100 WBC (ref 0–0.2)
PLATELET # BLD AUTO: 213 K/UL (ref 164–446)
PMV BLD AUTO: 11.3 FL (ref 9–12.9)
POTASSIUM SERPL-SCNC: 4.8 MMOL/L (ref 3.6–5.5)
PROT SERPL-MCNC: 6.3 G/DL (ref 6–8.2)
RBC # BLD AUTO: 4.69 M/UL (ref 4.2–5.4)
SODIUM SERPL-SCNC: 143 MMOL/L (ref 135–145)
TSH SERPL DL<=0.005 MIU/L-ACNC: 2.81 UIU/ML (ref 0.38–5.33)
VIT B12 SERPL-MCNC: 704 PG/ML (ref 211–911)
WBC # BLD AUTO: 4.5 K/UL (ref 4.8–10.8)

## 2024-05-25 LAB — VIT B1 BLD-MCNC: 170 NMOL/L (ref 70–180)

## 2024-05-30 ENCOUNTER — APPOINTMENT (OUTPATIENT)
Dept: MEDICAL GROUP | Facility: PHYSICIAN GROUP | Age: 74
End: 2024-05-30
Payer: MEDICARE

## 2024-05-30 ENCOUNTER — OFFICE VISIT (OUTPATIENT)
Dept: MEDICAL GROUP | Facility: PHYSICIAN GROUP | Age: 74
End: 2024-05-30
Payer: MEDICARE

## 2024-05-30 VITALS
BODY MASS INDEX: 28.99 KG/M2 | DIASTOLIC BLOOD PRESSURE: 70 MMHG | HEIGHT: 64 IN | HEART RATE: 67 BPM | OXYGEN SATURATION: 100 % | TEMPERATURE: 96.2 F | WEIGHT: 169.8 LBS | RESPIRATION RATE: 18 BRPM | SYSTOLIC BLOOD PRESSURE: 128 MMHG

## 2024-05-30 DIAGNOSIS — M25.562 ACUTE PAIN OF LEFT KNEE: ICD-10-CM

## 2024-05-30 RX ORDER — MECLIZINE HYDROCHLORIDE 25 MG/1
25 TABLET ORAL
COMMUNITY
Start: 2024-05-14

## 2024-05-30 RX ORDER — IBUPROFEN 800 MG/1
800 TABLET ORAL 2 TIMES DAILY PRN
Qty: 60 TABLET | Refills: 0 | Status: SHIPPED | OUTPATIENT
Start: 2024-05-30

## 2024-05-30 RX ORDER — ONDANSETRON 4 MG/1
4 TABLET, ORALLY DISINTEGRATING ORAL
COMMUNITY
Start: 2024-05-14

## 2024-05-30 RX ORDER — B-COMPLEX WITH VITAMIN C
1 TABLET ORAL DAILY
COMMUNITY

## 2024-05-30 ASSESSMENT — ENCOUNTER SYMPTOMS
SHORTNESS OF BREATH: 0
CHILLS: 0
FEVER: 0
WEIGHT LOSS: 0
WEAKNESS: 1
MEMORY LOSS: 1

## 2024-05-30 ASSESSMENT — FIBROSIS 4 INDEX: FIB4 SCORE: 1.97

## 2024-05-30 NOTE — PROGRESS NOTES
Verbal consent was acquired by the patient to use [employer] ambient listening note generation during this visit     CC:  Chief Complaint   Patient presents with    Knee Pain     L knee pain X 1 month (off and on), swelling, pain went away when wrapped with ACE bandage        Derek Guzman 73 y.o. female  patient presenting for     History of Present Illness  The patient is a 73-year-old female who presents today with complaints of left knee pain for the last month. She is accompanied by an adult female.    Left knee pain   The patient's  reports that the onset of the patient's left knee pain was approximately one month ago, characterized by swelling and joint pain. The pain was exacerbated by lying down and increased activity. Her  has been wrapping the patient's knee, which was tightly wrapped, which provided some relief. The patient denies any recent trauma to the knee, including exercise-induced falls or redness. However, approximately 5 to 6 days ago, the patient experienced pain upon touch, extending from the small bone to the knee of the lateral side of her knee. She denies any lower leg swelling or discoloration. Over the past few days, the patient has experienced popping in the knee on a few occasions. There is no pain during weight-bearing activities or walking. The pain intensifies with increased activity, occasionally disrupting her sleep. The patient has been administered ibuprofen 800 mg once daily, which appears to provide relief.      Review of Systems   Constitutional:  Negative for chills, fever, malaise/fatigue and weight loss.   Respiratory:  Negative for shortness of breath.    Cardiovascular:  Negative for chest pain.   Musculoskeletal:  Positive for joint pain.        Left knee pain and swelling   Neurological:  Positive for weakness.   Psychiatric/Behavioral:  Positive for memory loss.        /70   Pulse 67   Temp (!) 35.7 °C (96.2 °F) (Temporal)   Resp 18   Ht  "1.626 m (5' 4\")   Wt 77 kg (169 lb 12.8 oz)   SpO2 100% , Body mass index is 29.15 kg/m².    Physical Exam  Constitutional:       Appearance: Normal appearance.   HENT:      Head: Normocephalic and atraumatic.   Musculoskeletal:         General: Swelling and tenderness present. No deformity or signs of injury.   Skin:     General: Skin is warm and dry.      Findings: No erythema.   Neurological:      Mental Status: She is alert. Mental status is at baseline.   Psychiatric:         Mood and Affect: Mood normal.         Behavior: Behavior normal.         Thought Content: Thought content normal.         Judgment: Judgment normal.           Results      Assessment and Plan    Assessment & Plan  1. Pain in the left knee.  The patient is advised to take ibuprofen 800 mg thrice daily with meals on days with increased activity or worsening pain.  On days that she has decreased activity or no pain at all to reduce dose.   An x-ray of the left knee will be ordered to ascertain the presence of osteoarthritis. A referral for physical therapy has been provided. The patient is also advised to elevate the knee and apply ice to the affected area if swelling occurs.     1. Acute pain of left knee  DX-KNEE 2- LEFT    ibuprofen (MOTRIN) 800 MG Tab    Referral to Physical Therapy         Discussed with patient possible alternative diagnoses, patient is to take all medications as prescribed.     If symptoms persist FU w/PCP, if symptoms worsen go to emergency room.     If experiencing any side effects from prescribed medications reports to the office immediately or go to emergency room.    Reviewed indication, dosage, usage and potential adverse effects of prescribed medications.     Reviewed risks and benefits of treatment plan. Patient verbalizes understanding of all instruction and verbally agrees to plan.    Return for follow up imaging.    This note was created using voice recognition software ([employer]). The accuracy of the " dictation is limited by the abilities of the software. I have reviewed the note prior to signing, however some errors in grammar and context are still possible. If you have any questions related to this note please do not hesitate to contact our office.

## 2024-06-04 ENCOUNTER — DOCUMENTATION (OUTPATIENT)
Dept: NEUROLOGY | Facility: MEDICAL CENTER | Age: 74
End: 2024-06-04

## 2024-06-04 ENCOUNTER — APPOINTMENT (OUTPATIENT)
Dept: RADIOLOGY | Facility: IMAGING CENTER | Age: 74
End: 2024-06-04
Attending: NURSE PRACTITIONER
Payer: MEDICARE

## 2024-06-04 DIAGNOSIS — D72.819 LEUKOPENIA, UNSPECIFIED TYPE: ICD-10-CM

## 2024-06-04 DIAGNOSIS — M25.562 ACUTE PAIN OF LEFT KNEE: ICD-10-CM

## 2024-06-04 PROCEDURE — 73560 X-RAY EXAM OF KNEE 1 OR 2: CPT | Mod: TC,LT | Performed by: NURSE PRACTITIONER

## 2024-06-06 ENCOUNTER — APPOINTMENT (OUTPATIENT)
Dept: MEDICAL GROUP | Facility: PHYSICIAN GROUP | Age: 74
End: 2024-06-06
Payer: MEDICARE

## 2024-06-06 VITALS
BODY MASS INDEX: 29.5 KG/M2 | OXYGEN SATURATION: 92 % | WEIGHT: 172.8 LBS | RESPIRATION RATE: 18 BRPM | HEART RATE: 65 BPM | TEMPERATURE: 97.3 F | HEIGHT: 64 IN | SYSTOLIC BLOOD PRESSURE: 122 MMHG | DIASTOLIC BLOOD PRESSURE: 68 MMHG

## 2024-06-06 DIAGNOSIS — F02.80 ALZHEIMER'S DEMENTIA WITHOUT BEHAVIORAL DISTURBANCE, PSYCHOTIC DISTURBANCE, MOOD DISTURBANCE, OR ANXIETY, UNSPECIFIED DEMENTIA SEVERITY, UNSPECIFIED TIMING OF DEMENTIA ONSET (HCC): ICD-10-CM

## 2024-06-06 DIAGNOSIS — K57.30 SIGMOID DIVERTICULOSIS: ICD-10-CM

## 2024-06-06 DIAGNOSIS — E78.2 MIXED HYPERLIPIDEMIA: ICD-10-CM

## 2024-06-06 DIAGNOSIS — F02.A0 MILD LATE ONSET ALZHEIMER'S DEMENTIA WITHOUT BEHAVIORAL DISTURBANCE, PSYCHOTIC DISTURBANCE, MOOD DISTURBANCE, OR ANXIETY (HCC): ICD-10-CM

## 2024-06-06 DIAGNOSIS — Z00.00 MEDICARE ANNUAL WELLNESS VISIT, SUBSEQUENT: Primary | ICD-10-CM

## 2024-06-06 DIAGNOSIS — R73.03 PREDIABETES: ICD-10-CM

## 2024-06-06 DIAGNOSIS — G30.1 MILD LATE ONSET ALZHEIMER'S DEMENTIA WITHOUT BEHAVIORAL DISTURBANCE, PSYCHOTIC DISTURBANCE, MOOD DISTURBANCE, OR ANXIETY (HCC): ICD-10-CM

## 2024-06-06 DIAGNOSIS — M51.36 DEGENERATIVE DISC DISEASE, LUMBAR: ICD-10-CM

## 2024-06-06 DIAGNOSIS — J30.89 NON-SEASONAL ALLERGIC RHINITIS, UNSPECIFIED TRIGGER: ICD-10-CM

## 2024-06-06 DIAGNOSIS — G30.9 ALZHEIMER'S DEMENTIA WITHOUT BEHAVIORAL DISTURBANCE, PSYCHOTIC DISTURBANCE, MOOD DISTURBANCE, OR ANXIETY, UNSPECIFIED DEMENTIA SEVERITY, UNSPECIFIED TIMING OF DEMENTIA ONSET (HCC): ICD-10-CM

## 2024-06-06 DIAGNOSIS — M85.89 OSTEOPENIA OF MULTIPLE SITES: ICD-10-CM

## 2024-06-06 PROBLEM — E78.00 ELEVATED LDL CHOLESTEROL LEVEL: Status: RESOLVED | Noted: 2018-07-23 | Resolved: 2024-06-06

## 2024-06-06 PROBLEM — R41.89 COGNITIVE CHANGES: Status: RESOLVED | Noted: 2024-02-02 | Resolved: 2024-06-06

## 2024-06-06 PROBLEM — M25.562 ACUTE PAIN OF LEFT KNEE: Status: RESOLVED | Noted: 2024-05-30 | Resolved: 2024-06-06

## 2024-06-06 PROCEDURE — G0439 PPPS, SUBSEQ VISIT: HCPCS | Performed by: NURSE PRACTITIONER

## 2024-06-06 ASSESSMENT — ENCOUNTER SYMPTOMS: GENERAL WELL-BEING: GOOD

## 2024-06-06 ASSESSMENT — FIBROSIS 4 INDEX: FIB4 SCORE: 1.97

## 2024-06-06 ASSESSMENT — ACTIVITIES OF DAILY LIVING (ADL): BATHING_REQUIRES_ASSISTANCE: 0

## 2024-06-06 ASSESSMENT — PATIENT HEALTH QUESTIONNAIRE - PHQ9: CLINICAL INTERPRETATION OF PHQ2 SCORE: 0

## 2024-06-06 NOTE — PROGRESS NOTES
Chief Complaint   Patient presents with    Annual Wellness Visit       HPI:  Derek Guzman is a 73 y.o. here for Medicare Annual Wellness Visit     Patient Active Problem List    Diagnosis Date Noted    Dementia ([facility]) 02/02/2024    Mild late onset Alzheimer's dementia without behavioral disturbance, psychotic disturbance, mood disturbance, or anxiety (HCC) 05/23/2023    Mixed hyperlipidemia 05/23/2023    Prediabetes 07/23/2018    Osteopenia of multiple sites 07/31/2017    Primary osteoarthritis of both hands 07/31/2017    Sigmoid diverticulosis 08/17/2016    Degenerative disc disease, lumbar 09/29/2014    History of melanoma 10/10/2013    History of colonoscopy with polypectomy 06/02/2010    Non-seasonal allergic rhinitis 10/01/2009       Current Outpatient Medications   Medication Sig Dispense Refill    Calcium Carbonate-Vitamin D 600-5 MG-MCG Tab Take 1 Tablet by mouth every day.      ibuprofen (MOTRIN) 800 MG Tab Take 1 Tablet by mouth 2 times a day as needed for Moderate Pain or Inflammation. 60 Tablet 0    Multiple Vitamin (MULTI-VITAMIN PO) Take  by mouth.      Ascorbic Acid (VITAMIN C PO) Take  by mouth.      diphenhydrAMINE (BENADRYL) 25 MG Tab Take 25 mg by mouth at bedtime as needed for Sleep.      Calcium Carbonate-Vitamin D (CALCIUM + D PO) Take 1 Tab by mouth every day.      meclizine (ANTIVERT) 25 MG Tab Take 25 mg by mouth. (Patient not taking: Reported on 5/30/2024)      ondansetron (ZOFRAN ODT) 4 MG TABLET DISPERSIBLE Take 4 mg by mouth. (Patient not taking: Reported on 5/30/2024)       No current facility-administered medications for this visit.          Current supplements as per medication list.     Allergies: Tape and Trace metals    Current social contact/activities:   taking care of 3 dogs  Works in yard  Reading still but difficulty remembering what she read  Word search puzzles      She  reports that she quit smoking about 46 years ago. Her smoking use included cigarettes. She started  smoking about 53 years ago. She has a 14.5 pack-year smoking history. She has never used smokeless tobacco. She reports that she does not currently use alcohol. She reports that she does not use drugs.  Counseling given: Not Answered  Tobacco comments: avoid all tobacco products      ROS:    Gait: Uses no assistive device  Ostomy: No  Other tubes: No  Amputations: No  Chronic oxygen use: No  Last eye exam: about 1.5 year ago   Wears hearing aids: No   : Denies any urinary leakage during the last 6 months    Screening:    Depression Screening  Little interest or pleasure in doing things?  0 - not at all  Feeling down, depressed , or hopeless? 0 - not at all  Patient Health Questionnaire Score: 0     If depressive symptoms identified deferred to follow up visit unless specifically addressed in assessment and plan.    Interpretation of PHQ-9 Total Score   Score Severity   1-4 No Depression   5-9 Mild Depression   10-14 Moderate Depression   15-19 Moderately Severe Depression   20-27 Severe Depression    Screening for Cognitive Impairment  Do you or any of your friends or family members have any concern about your memory? Yes  Three Minute Recall (Leader, Season, Table) 0/3    Reece clock face with all 12 numbers and set the hands to show 10 minutes after 11.  No    Cognitive concerns identified deferred for follow up unless specifically addressed in assessment and plan.    Fall Risk Assessment  Has the patient had two or more falls in the last year or any fall with injury in the last year?  No    Safety Assessment  Do you always wear your seatbelt?  No  Any changes to home needed to function safely? No  Difficulty hearing.  No  Patient counseled about all safety risks that were identified.    Functional Assessment ADLs  Are there any barriers preventing you from cooking for yourself or meeting nutritional needs?  No.    Are there any barriers preventing you from driving safely or obtaining transportation?  No.    Are  there any barriers preventing you from using a telephone or calling for help?  No    Are there any barriers preventing you from shopping?  No.    Are there any barriers preventing you from taking care of your own finances?  No    Are there any barriers preventing you from managing your medications?  Yes    Are there any barriers preventing you from showering, bathing or dressing yourself? No    Are there any barriers preventing you from doing housework or laundry? No  Are there any barriers preventing you from using the toilet?No  Are you currently engaging in any exercise or physical activity?  Yes.      Self-Assessment of Health  What is your perception of your health? Good  Do you sleep more than six hours a night? Yes  In the past 7 days, how much did pain keep you from doing your normal work? None  Do you spend quality time with family or friends (virtually or in person)? Yes  Do you usually eat a heart healthy diet that constists of a variety of fruits, vegetables, whole grains and fiber? No  Do you eat foods high in fat and/or Fast Food more than three times per week? No    Advance Care Planning  Do you have an Advance Directive, Living Will, Durable Power of , or POLST? Yes      Durable Power of    is not on file - instructed patient to bring in a copy to scan into their chart      Health Maintenance Summary            Postponed - COVID-19 Vaccine (4 - 2023-24 season) Postponed until 5/30/2025 12/29/2021  Imm Admin: MODERNA SARS-COV-2 VACCINE (12+)    05/03/2021  Imm Admin: MODERNA SARS-COV-2 VACCINE (12+)    04/06/2021  Imm Admin: MODERNA SARS-COV-2 VACCINE (12+)              Influenza Vaccine (Season Ended) Next due on 9/1/2024 12/29/2021  Imm Admin: Influenza, Unspecified - HISTORICAL DATA    10/21/2020  Imm Admin: Influenza Vaccine Adult HD    12/11/2019  Imm Admin: Influenza Vaccine Adult HD              Annual Wellness Visit (Yearly) Next due on 6/6/2025 06/06/2024   Level of Service: ANNUAL WELLNESS VISIT-INCLUDES PPPS SUBSEQUE*    06/06/2024  Visit Dx: Medicare annual wellness visit, subsequent    04/11/2023  Visit Dx: Medicare annual wellness visit, subsequent    04/11/2023  Subsequent Annual Wellness Visit - Includes PPPS ()    06/30/2022  Visit Dx: Medicare annual wellness visit, subsequent    Only the first 5 history entries have been loaded, but more history exists.              Mammogram (Every 2 Years) Next due on 7/26/2025 07/26/2023  MA-SCREENING MAMMO BILAT W/TOMOSYNTHESIS W/CAD    07/01/2022  MA-SCREENING MAMMO BILAT W/TOMOSYNTHESIS W/CAD    02/18/2021  MA-SCREENING MAMMO BILAT W/TOMOSYNTHESIS W/CAD    01/22/2020  MA-SCREENING MAMMO BILAT W/TOMOSYNTHESIS W/CAD    05/15/2018  MA-MAMMO SCREENING BILAT W/BERNICE W/CAD    Only the first 5 history entries have been loaded, but more history exists.              Bone Density Scan (Every 5 Years) Next due on 9/22/2026 09/22/2021  DS-BONE DENSITY STUDY (DEXA)    06/17/2016  DS-BONE DENSITY STUDY (DEXA)    06/17/2009  DS-BONE DENSITY STUDY (DEXA)              Colorectal Cancer Screening (Colonoscopy - Every 7 Years) Next due on 2/13/2031 02/13/2024  COLONOSCOPY RESULTS    01/24/2024  AMB EXTERNAL COLONOSCOPY RESULTS    08/17/2016  REFERRAL TO GI FOR COLONOSCOPY              IMM DTaP/Tdap/Td Vaccine (3 - Td or Tdap) Next due on 7/18/2033 07/18/2023  Imm Admin: Tdap Vaccine    04/05/2013  Imm Admin: Tdap Vaccine              Hepatitis C Screening  Tentatively Complete      12/12/2019  Hepatitis C Antibody component of HEP C VIRUS ANTIBODY              Zoster (Shingles) Vaccines (Series Information) Completed      02/28/2020  Imm Admin: Zoster Vaccine Recombinant (RZV) (SHINGRIX)    12/27/2019  Imm Admin: Zoster Vaccine Recombinant (RZV) (SHINGRIX)    05/20/2016  Imm Admin: Zoster Vaccine Live (ZVL) (Zostavax) - HISTORICAL DATA              Pneumococcal Vaccine: 65+ Years (Series Information) Completed       10/21/2020  Imm Admin: Pneumococcal polysaccharide vaccine (PPSV-23)    2018  Imm Admin: Pneumococcal Conjugate Vaccine (Prevnar/PCV-13)    2016  Imm Admin: Pneumococcal polysaccharide vaccine (PPSV-23)              Hepatitis A Vaccine (Hep A) (Series Information) Aged Out      No completion history exists for this topic.              Hepatitis B Vaccine (Hep B) (Series Information) Aged Out      No completion history exists for this topic.              HPV Vaccines (Series Information) Aged Out      No completion history exists for this topic.              Polio Vaccine (Inactivated Polio) (Series Information) Aged Out      No completion history exists for this topic.              Meningococcal Immunization (Series Information) Aged Out      No completion history exists for this topic.              Discontinued - Cervical Cancer Screening  Discontinued        Frequency changed to Never automatically (Topic No Longer Applies)    2018  THINPREP PAP WITH HPV    2018  PATHOLOGY GYN SPECIMEN    10/03/2013  PAP IG, RFX HPV ASCU                    Patient Care Team:  LAURE Willard as PCP - General (Nurse Practitioner Primary Care)  Jp Mitchell M.D. (Neurology)        Social History     Tobacco Use    Smoking status: Former     Current packs/day: 0.00     Average packs/day: 0.5 packs/day for 29.0 years (14.5 ttl pk-yrs)     Types: Cigarettes     Start date: 1971     Quit date: 1978     Years since quittin.4    Smokeless tobacco: Never    Tobacco comments:     avoid all tobacco products   Vaping Use    Vaping status: Never Used   Substance Use Topics    Alcohol use: Not Currently    Drug use: No     Family History   Problem Relation Age of Onset    Colorectal Cancer Mother     Cancer Mother         colon cancer twice, lung cancer    Diabetes Mother     Heart Disease Father         mi    Diabetes Father     Heart Attack Father     No Known Problems Sister     No Known  "Problems Sister     No Known Problems Sister     Diabetes Brother     No Known Problems Brother     No Known Problems Maternal Aunt     No Known Problems Maternal Uncle     No Known Problems Paternal Aunt     No Known Problems Paternal Uncle     No Known Problems Maternal Grandmother     No Known Problems Maternal Grandfather     No Known Problems Paternal Grandmother     No Known Problems Paternal Grandfather     No Known Problems Son     No Known Problems Son      She  has a past medical history of Allergic rhinitis (10/01/2009), Allergy, Cancer (HCC), Cataract, Cognitive changes (2024), Elevated LDL cholesterol level (2018), Osteopenia, Osteoporosis, Pain, and Sigmoid diverticulosis (2016).   Past Surgical History:   Procedure Laterality Date    ORIF, FRACTURE, TIBIA Left 2017    Procedure: TIBIA ORIF;  Surgeon: Casey Jewell M.D.;  Location: SURGERY [facility];  Service:     TOE FUSION  2011    Performed by BARTOLOME ROBLES at [facility]    BONE GRAFT  2011    Performed by BARTOLOME ROBLES at [facility]    SHOULDER ARTHROSCOPY  2009    right; Performed by RICHARD ELIZONDO at SURGERY [facility] DAY [facility]    ROTATOR CUFF REPAIR  2009    Performed by RICHARD ELIZONDO at SURGERY SAME DAY [facility]    HI  DELIVERY ONLY  1979    EYE SURGERY      PRIMARY C SECTION         Exam:   /68   Pulse 65   Temp 36.3 °C (97.3 °F) (Temporal)   Resp 18   Ht 1.626 m (5' 4\")   Wt 78.4 kg (172 lb 12.8 oz)   SpO2 92%  Body mass index is 29.66 kg/m².    Hearing good.    Dentition good  Alert, oriented in no acute distress.  Eye contact is good, speech goal directed, affect calm    Assessment and Plan. The following treatment and monitoring plan is recommended:    Problem   Dementia (Hcc)    Chronic and stable condition   Follows with Dr. Mitchell every 6 months  Notes worsening short term memory   Is still cooking, driving     Mild " Late Onset Alzheimer's Dementia Without Behavioral Disturbance, Psychotic Disturbance, Mood Disturbance, Or Anxiety (Hcc)    Chronic and stable condition    Follows with Dr. Mitchell every 6 months  Notes worsening short term memory   Is still cooking, driving- completed driving simulation and passed     Mixed Hyperlipidemia    Chronic stable condition   Not on any medication  Will get new labs     Osteopenia of Multiple Sites    Chronic and stable condition   DEXA scan completed 9/2021  Left forearm -2.4  Left femur -1.9  Takes OCT vitamin D and Calcium   Is active with walking       Sigmoid Diverticulosis    Chronic and stable condition  Recall 2031         Degenerative Disc Disease, Lumbar    Chronic and stable condition  No new concerns      Non-Seasonal Allergic Rhinitis    Chronic and stable condition  Notes occasional issues with allergies  Uses over-the-counter sprays, allergy medication as needed mostly in summer time            Services suggested: No services needed at this time  Health Care Screening: Age-appropriate preventive services recommended by USPTF and ACIP covered by Medicare were discussed today. Services ordered if indicated and agreed upon by the patient.  Referrals offered: Community-based lifestyle interventions to reduce health risks and promote self-management and wellness, fall prevention, nutrition, physical activity, tobacco-use cessation, weight loss, and mental health services as per orders if indicated.    Discussion today about general wellness and lifestyle habits:    Prevent falls and reduce trip hazards; Cautioned about securing or removing rugs.  Have a working fire alarm and carbon monoxide detector;   Engage in regular physical activity and social activities     Follow-up: Return for 6 month follow up .

## 2024-07-30 ENCOUNTER — OFFICE VISIT (OUTPATIENT)
Dept: MEDICAL GROUP | Facility: PHYSICIAN GROUP | Age: 74
End: 2024-07-30
Payer: MEDICARE

## 2024-07-30 VITALS
RESPIRATION RATE: 18 BRPM | BODY MASS INDEX: 29.37 KG/M2 | HEART RATE: 65 BPM | OXYGEN SATURATION: 95 % | TEMPERATURE: 97 F | HEIGHT: 64 IN | DIASTOLIC BLOOD PRESSURE: 78 MMHG | WEIGHT: 172 LBS | SYSTOLIC BLOOD PRESSURE: 126 MMHG

## 2024-07-30 DIAGNOSIS — M25.562 CHRONIC PAIN OF BOTH KNEES: ICD-10-CM

## 2024-07-30 DIAGNOSIS — G89.29 CHRONIC PAIN OF BOTH KNEES: ICD-10-CM

## 2024-07-30 DIAGNOSIS — M25.561 CHRONIC PAIN OF BOTH KNEES: ICD-10-CM

## 2024-07-30 RX ORDER — CETIRIZINE HYDROCHLORIDE 10 MG/1
10 TABLET ORAL
COMMUNITY

## 2024-07-30 ASSESSMENT — ENCOUNTER SYMPTOMS
SHORTNESS OF BREATH: 0
WEIGHT LOSS: 0
PALPITATIONS: 0
HEADACHES: 0
FEVER: 0
DIZZINESS: 0
CHILLS: 0

## 2024-07-30 ASSESSMENT — FIBROSIS 4 INDEX: FIB4 SCORE: 1.97

## 2024-08-07 ENCOUNTER — APPOINTMENT (OUTPATIENT)
Dept: MEDICAL GROUP | Facility: PHYSICIAN GROUP | Age: 74
End: 2024-08-07
Payer: MEDICARE

## 2024-10-15 ENCOUNTER — OFFICE VISIT (OUTPATIENT)
Dept: NEUROLOGY | Facility: MEDICAL CENTER | Age: 74
End: 2024-10-15
Attending: PSYCHIATRY & NEUROLOGY
Payer: MEDICARE

## 2024-10-15 VITALS
WEIGHT: 173.5 LBS | DIASTOLIC BLOOD PRESSURE: 64 MMHG | BODY MASS INDEX: 29.62 KG/M2 | TEMPERATURE: 98.7 F | HEIGHT: 64 IN | RESPIRATION RATE: 12 BRPM | SYSTOLIC BLOOD PRESSURE: 102 MMHG | OXYGEN SATURATION: 97 % | HEART RATE: 56 BPM

## 2024-10-15 DIAGNOSIS — F02.A0 MILD LATE ONSET ALZHEIMER'S DEMENTIA WITHOUT BEHAVIORAL DISTURBANCE, PSYCHOTIC DISTURBANCE, MOOD DISTURBANCE, OR ANXIETY (HCC): ICD-10-CM

## 2024-10-15 DIAGNOSIS — G30.1 MILD LATE ONSET ALZHEIMER'S DEMENTIA WITHOUT BEHAVIORAL DISTURBANCE, PSYCHOTIC DISTURBANCE, MOOD DISTURBANCE, OR ANXIETY (HCC): ICD-10-CM

## 2024-10-15 PROCEDURE — 3074F SYST BP LT 130 MM HG: CPT | Performed by: PSYCHIATRY & NEUROLOGY

## 2024-10-15 PROCEDURE — 3078F DIAST BP <80 MM HG: CPT | Performed by: PSYCHIATRY & NEUROLOGY

## 2024-10-15 PROCEDURE — 99212 OFFICE O/P EST SF 10 MIN: CPT | Performed by: PSYCHIATRY & NEUROLOGY

## 2024-10-15 PROCEDURE — 99214 OFFICE O/P EST MOD 30 MIN: CPT | Performed by: PSYCHIATRY & NEUROLOGY

## 2024-10-15 ASSESSMENT — FIBROSIS 4 INDEX: FIB4 SCORE: 1.97

## 2024-10-15 ASSESSMENT — PATIENT HEALTH QUESTIONNAIRE - PHQ9
CLINICAL INTERPRETATION OF PHQ2 SCORE: 1
SUM OF ALL RESPONSES TO PHQ QUESTIONS 1-9: 4
5. POOR APPETITE OR OVEREATING: 0 - NOT AT ALL

## 2024-10-17 ENCOUNTER — OFFICE VISIT (OUTPATIENT)
Dept: MEDICAL GROUP | Facility: PHYSICIAN GROUP | Age: 74
End: 2024-10-17
Payer: MEDICARE

## 2024-10-17 ENCOUNTER — APPOINTMENT (OUTPATIENT)
Dept: RADIOLOGY | Facility: IMAGING CENTER | Age: 74
End: 2024-10-17
Attending: NURSE PRACTITIONER
Payer: MEDICARE

## 2024-10-17 VITALS
HEART RATE: 64 BPM | DIASTOLIC BLOOD PRESSURE: 60 MMHG | BODY MASS INDEX: 29.98 KG/M2 | HEIGHT: 64 IN | RESPIRATION RATE: 20 BRPM | OXYGEN SATURATION: 96 % | SYSTOLIC BLOOD PRESSURE: 112 MMHG | TEMPERATURE: 97.6 F | WEIGHT: 175.6 LBS

## 2024-10-17 DIAGNOSIS — G89.29 CHRONIC PAIN OF RIGHT KNEE: ICD-10-CM

## 2024-10-17 DIAGNOSIS — M25.561 CHRONIC PAIN OF RIGHT KNEE: ICD-10-CM

## 2024-10-17 DIAGNOSIS — Z23 NEED FOR VACCINATION: ICD-10-CM

## 2024-10-17 PROCEDURE — 3078F DIAST BP <80 MM HG: CPT | Performed by: NURSE PRACTITIONER

## 2024-10-17 PROCEDURE — 99213 OFFICE O/P EST LOW 20 MIN: CPT | Mod: 25 | Performed by: NURSE PRACTITIONER

## 2024-10-17 PROCEDURE — 73562 X-RAY EXAM OF KNEE 3: CPT | Mod: TC,RT | Performed by: NURSE PRACTITIONER

## 2024-10-17 PROCEDURE — 3074F SYST BP LT 130 MM HG: CPT | Performed by: NURSE PRACTITIONER

## 2024-10-17 PROCEDURE — G0008 ADMIN INFLUENZA VIRUS VAC: HCPCS | Performed by: NURSE PRACTITIONER

## 2024-10-17 PROCEDURE — 90662 IIV NO PRSV INCREASED AG IM: CPT | Performed by: NURSE PRACTITIONER

## 2024-10-17 ASSESSMENT — ENCOUNTER SYMPTOMS
CHILLS: 0
SHORTNESS OF BREATH: 0
FALLS: 0
PALPITATIONS: 0
WEIGHT LOSS: 0
WEAKNESS: 1
FEVER: 0
TINGLING: 0

## 2024-10-17 ASSESSMENT — FIBROSIS 4 INDEX: FIB4 SCORE: 1.97

## 2024-10-22 ENCOUNTER — APPOINTMENT (OUTPATIENT)
Dept: MEDICAL GROUP | Facility: PHYSICIAN GROUP | Age: 74
End: 2024-10-22
Payer: MEDICARE

## 2025-01-28 PROBLEM — M17.11 ARTHRITIS OF RIGHT KNEE: Status: ACTIVE | Noted: 2025-01-28

## 2025-04-14 ENCOUNTER — PATIENT MESSAGE (OUTPATIENT)
Dept: NEUROLOGY | Facility: MEDICAL CENTER | Age: 75
End: 2025-04-14
Payer: MEDICARE

## 2025-04-14 NOTE — PATIENT COMMUNICATION
Please see below and advise     Just letting you know Derek is going down hill really fast, she now is asking the same questions over and over, time has no meaning at this point. She will ask a question like what are we doing today, not 5 minutes later she is asking the same question and 5 minutes after that, it goes on all day sometimes. She is forgetting what she wants to say/talk about a lot and it is really upsetting her.

## 2025-04-15 NOTE — PATIENT COMMUNICATION
Please see below     My sister in law said that the doctors in [place]. are doing something called infusion to break down the plaque in the brain. Its done monthly and it seems to work well for the patients. Derek's dementia started slowly but it is moving a bit faster as time goes so I am thinking Donepezil. Unless you feel the Aricept would work better. She has been with me for 51 years that is why I'm trying anything.

## 2025-04-22 ENCOUNTER — OFFICE VISIT (OUTPATIENT)
Dept: URGENT CARE | Facility: CLINIC | Age: 75
End: 2025-04-22
Payer: MEDICARE

## 2025-04-22 ENCOUNTER — APPOINTMENT (OUTPATIENT)
Dept: RADIOLOGY | Facility: IMAGING CENTER | Age: 75
End: 2025-04-22
Attending: PHYSICIAN ASSISTANT
Payer: MEDICARE

## 2025-04-22 VITALS
HEIGHT: 64 IN | HEART RATE: 86 BPM | OXYGEN SATURATION: 92 % | WEIGHT: 156 LBS | TEMPERATURE: 98.1 F | DIASTOLIC BLOOD PRESSURE: 60 MMHG | RESPIRATION RATE: 16 BRPM | SYSTOLIC BLOOD PRESSURE: 100 MMHG | BODY MASS INDEX: 26.63 KG/M2

## 2025-04-22 DIAGNOSIS — J98.4 PNEUMONITIS: ICD-10-CM

## 2025-04-22 DIAGNOSIS — R05.1 ACUTE COUGH: ICD-10-CM

## 2025-04-22 DIAGNOSIS — J98.8 RESPIRATORY TRACT INFECTION: ICD-10-CM

## 2025-04-22 DIAGNOSIS — J06.9 VIRAL URI WITH COUGH: ICD-10-CM

## 2025-04-22 LAB
FLUAV RNA SPEC QL NAA+PROBE: NEGATIVE
FLUBV RNA SPEC QL NAA+PROBE: NEGATIVE
RSV RNA SPEC QL NAA+PROBE: NEGATIVE
SARS-COV-2 RNA RESP QL NAA+PROBE: NEGATIVE

## 2025-04-22 PROCEDURE — 0241U POCT CEPHEID COV-2, FLU A/B, RSV - PCR: CPT | Performed by: PHYSICIAN ASSISTANT

## 2025-04-22 PROCEDURE — 71046 X-RAY EXAM CHEST 2 VIEWS: CPT | Mod: TC | Performed by: RADIOLOGY

## 2025-04-22 PROCEDURE — 99213 OFFICE O/P EST LOW 20 MIN: CPT | Performed by: PHYSICIAN ASSISTANT

## 2025-04-22 RX ORDER — POLYETHYLENE GLYCOL 3350 17 G/17G
17 POWDER, FOR SOLUTION ORAL DAILY
COMMUNITY
Start: 2025-03-14

## 2025-04-22 RX ORDER — DOXYCYCLINE HYCLATE 100 MG
100 TABLET ORAL 2 TIMES DAILY
Qty: 14 TABLET | Refills: 0 | Status: SHIPPED | OUTPATIENT
Start: 2025-04-22 | End: 2025-04-29

## 2025-04-22 RX ORDER — MAGNESIUM CARB/ALUMINUM HYDROX 105-160MG
TABLET,CHEWABLE ORAL
COMMUNITY
Start: 2025-03-16

## 2025-04-22 RX ORDER — BENZONATATE 100 MG/1
100 CAPSULE ORAL 3 TIMES DAILY PRN
Qty: 60 CAPSULE | Refills: 0 | Status: SHIPPED | OUTPATIENT
Start: 2025-04-22

## 2025-04-22 RX ORDER — HYDROCORTISONE 25 MG/G
OINTMENT TOPICAL
COMMUNITY
Start: 2025-04-15

## 2025-04-22 ASSESSMENT — ENCOUNTER SYMPTOMS
COUGH: 1
HEADACHES: 0
SHORTNESS OF BREATH: 1
EYE DISCHARGE: 0
VOMITING: 0
MYALGIAS: 0
DIARRHEA: 0
WHEEZING: 0
NAUSEA: 0
FEVER: 0
EYE REDNESS: 0
SORE THROAT: 1

## 2025-04-22 ASSESSMENT — FIBROSIS 4 INDEX: FIB4 SCORE: 2

## 2025-04-22 NOTE — PROGRESS NOTES
Subjective     Derek Guzman is a 74 y.o. female who presents with Cough and Congestion            Cough  This is a new problem. Episode onset: x 3 days ago, worse today. The cough is Productive of sputum. Associated symptoms include nasal congestion, a sore throat and shortness of breath (The patient reports intermittent shortness of breath.). Pertinent negatives include no ear pain, eye redness, fever, headaches, myalgias or wheezing. She has tried nothing for the symptoms.     The patient reports several family members have been sick.     PMH:  has a past medical history of Allergic rhinitis (10/01/2009), Allergy, Cancer ([facility]), Cataract, Cognitive changes (02/02/2024), Elevated LDL cholesterol level (07/23/2018), Osteopenia, Osteoporosis, Pain, and Sigmoid diverticulosis (08/17/2016).  MEDS:   Current Outpatient Medications:     doxycycline (VIBRAMYCIN) 100 MG Tab, Take 1 Tablet by mouth 2 times a day for 7 days., Disp: 14 Tablet, Rfl: 0    benzonatate (TESSALON) 100 MG Cap, Take 1 Capsule by mouth 3 times a day as needed for Cough., Disp: 60 Capsule, Rfl: 0    Magnesium Citrate 1.745 GM/30ML Solution, TAKE 196 ML BY MOUTH ONE TIME FOR 1 DOSE. TAKE WITH WATER (Patient not taking: Reported on 4/22/2025), Disp: , Rfl:     polyethylene glycol 3350 (MIRALAX) 17 GM/SCOOP Powder, Take 17 g by mouth every day. (Patient not taking: Reported on 4/22/2025), Disp: , Rfl:     hydrocortisone 2.5 % Ointment, APPLY TOPICALLY TO THE AFFECTED AREA TWICE DAILY FOR 4 DAYS (Patient not taking: Reported on 4/22/2025), Disp: , Rfl:     lactulose 10 g/15mL Solution, , Disp: , Rfl:     docusate sodium (COLACE) 100 MG Cap, Take 1 Capsule by mouth 3 times a day as needed for Constipation. (Patient not taking: Reported on 4/22/2025), Disp: 90 Capsule, Rfl: 0    ondansetron (ZOFRAN ODT) 4 MG TABLET DISPERSIBLE, Take 1 Tablet by mouth every 6 hours as needed for Nausea/Vomiting., Disp: 20 Tablet, Rfl: 0    cetirizine (ZYRTEC) 10 MG Tab,  Take 10 mg by mouth. (Patient not taking: Reported on 2025), Disp: , Rfl:     diphenhydrAMINE (BENADRYL) 25 MG Tab, Take 25 mg by mouth at bedtime as needed for Sleep. (Patient not taking: Reported on 2025), Disp: , Rfl:   ALLERGIES:   Allergies   Allergen Reactions    Tape Rash     Blisters. Tegaderm ok     SURGHX:   Past Surgical History:   Procedure Laterality Date    PB TOTAL KNEE ARTHROPLASTY Right 2025    Procedure: RIGHT TOTAL KNEE ARTHROPLASTY;  Surgeon: Aidan Orosco M.D.;  Location: [facility];  Service: Orthopedics    ORIF, FRACTURE, TIBIA Left 2017    Procedure: TIBIA ORIF;  Surgeon: Casey Jewell M.D.;  Location: [facility];  Service:     TOE FUSION  2011    Performed by BARTOLOME ROBLES at SURGERY [facility]    BONE GRAFT  2011    Performed by BARTOLOME ROBLES at SURGERY [facility]    SHOULDER ARTHROSCOPY  2009    right; Performed by RICHARD ELIZONDO at SUR[facility]ERY [facility] DAY [facility]    ROTATOR CUFF REPAIR  2009    Performed by RICHARD ELIZONDO at SURGERY SAME DAY [facility]    CT  DELIVERY ONLY  1979    EYE SURGERY      PRIMARY C SECTION       SOCHX:  reports that she quit smoking about 47 years ago. Her smoking use included cigarettes. She started smoking about 54 years ago. She has a 14.5 pack-year smoking history. She has never used smokeless tobacco. She reports that she does not currently use alcohol. She reports that she does not use drugs.  FH: Family history was reviewed, no pertinent findings to report    Review of Systems   Constitutional:  Negative for fever.   HENT:  Positive for congestion and sore throat. Negative for ear pain.    Eyes:  Negative for discharge and redness.   Respiratory:  Positive for cough and shortness of breath (The patient reports intermittent shortness of breath.). Negative for wheezing.    Gastrointestinal:  Negative for diarrhea, nausea and vomiting.  "  Musculoskeletal:  Negative for myalgias.   Neurological:  Negative for headaches.              Objective     /60   Pulse 86   Temp 36.7 °C (98.1 °F) (Temporal)   Resp 16   Ht 1.626 m (5' 4\")   Wt 70.8 kg (156 lb)   SpO2 92%   BMI 26.78 kg/m²      Physical Exam  Constitutional:       General: She is not in acute distress.     Appearance: Normal appearance. She is not ill-appearing.   HENT:      Head: Normocephalic and atraumatic.      Right Ear: Tympanic membrane, ear canal and external ear normal.      Left Ear: Tympanic membrane, ear canal and external ear normal.      Nose: Nose normal.      Mouth/Throat:      Mouth: Mucous membranes are moist.      Pharynx: Oropharynx is clear. No posterior oropharyngeal erythema.   Eyes:      Extraocular Movements: Extraocular movements intact.      Conjunctiva/sclera: Conjunctivae normal.   Cardiovascular:      Rate and Rhythm: Normal rate and regular rhythm.      Heart sounds: Normal heart sounds.   Pulmonary:      Effort: Pulmonary effort is normal. No respiratory distress.      Breath sounds: Normal breath sounds. No wheezing.   Musculoskeletal:         General: Normal range of motion.      Cervical back: Normal range of motion and neck supple.   Skin:     General: Skin is warm and dry.   Neurological:      Mental Status: She is alert and oriented to person, place, and time.               Progress:  Results for orders placed or performed in visit on 04/22/25   POCT CoV-2, Flu A/B, RSV by PCR    Collection Time: 04/22/25  2:42 PM   Result Value Ref Range    SARS-CoV-2 by PCR Negative Negative, Invalid    Influenza virus A RNA Negative Negative, Invalid    Influenza virus B, PCR Negative Negative, Invalid    RSV, PCR Negative Negative, Invalid          CXR:  COMPARISON:  None.     FINDINGS:  There is a faint wedge-shaped airspace opacity in right upper lobe  The cardiac silhouette is normal in size.  No effusions or pneumothoraces are present.  There are no " significant osseous abnormalities.  The visualized portions of the upper abdomen are within normal limits.        IMPRESSION:  Faint wedge-shaped airspace opacity in right upper lobe consistent with atelectasis or pneumonitis.                Assessment & Plan  Respiratory tract infection    Orders:    doxycycline (VIBRAMYCIN) 100 MG Tab; Take 1 Tablet by mouth 2 times a day for 7 days.    Acute cough    Orders:    DX-CHEST-2 VIEWS; Future    POCT CoV-2, Flu A/B, RSV by PCR    benzonatate (TESSALON) 100 MG Cap; Take 1 Capsule by mouth 3 times a day as needed for Cough.    Pneumonitis    Orders:    doxycycline (VIBRAMYCIN) 100 MG Tab; Take 1 Tablet by mouth 2 times a day for 7 days.         The patient's presenting symptoms and physical exam findings are consistent with an acute respiratory tract infection with an associated cough.  The patient's physical exam today in clinic was normal.  The patient's lungs were good auscultation without wheezing or rhonchi, and her pulse ox was within normal limits.  The patient is nontoxic and appears in no acute distress.  The patient's vital signs are stable and within normal limits.  She is afebrile today in clinic.  A chest x-ray was obtained to further evaluate the patient's current symptoms and recent exposure to pneumonia.  The patient's chest x-ray showed a faint wedge-shaped airspace opacity in the right upper lobe, consistent with atelectasis or pneumonitis.  The patient's POCT Cepheid viral testing today in clinic was negative for COVID-19, influenza, and RSV.  Based on the patient's presenting symptoms, physical exam findings, and chest x-ray results, will prescribe the patient doxycycline for acute pneumonitis.  Will also prescribe the patient Tessalon for symptomatic relief of her cough.  Advised the patient to monitor for worsening signs and or symptoms.  Recommend OTC medications and supportive care for symptomatic management.  Recommend patient follow-up with  primary care as needed.  Discussed return precautions with the patient, and she verbalized understanding.    Differential diagnoses, supportive care, and indications for immediate follow-up discussed with patient.   Instructed to return to clinic or nearest emergency department for any change in condition, further concerns, or worsening of symptoms.    OTC Tylenol or Motrin for fever/discomfort.  OTC cough/cold medication for symptomatic relief  OTC Supportive Care for Congestion - saline nasal spray or neti pot  Drink plenty of fluids  Follow-up with PCP  Return to clinic or go to the ED if symptoms worsen or fail to improve, or if the patient should develop worsening/increasing cough, congestion, ear pain, sore throat, shortness of breath, wheezing, chest pain, fever/chills, and/or any concerning symptoms.    Discussed plan with the patient, and she agrees to the above.     I personally reviewed prior external notes and test results pertinent to today's visit.  I have independently reviewed and interpreted all diagnostics ordered during this urgent care visit.     Please note that this dictation was created using voice recognition software. I have made every reasonable attempt to correct obvious errors, but I expect that there may be errors of grammar and possibly content that I did not discover before finalizing the note.     This note was electronically signed by Rajwinder Marin PA-C

## 2025-05-20 ENCOUNTER — APPOINTMENT (OUTPATIENT)
Dept: MEDICAL GROUP | Facility: PHYSICIAN GROUP | Age: 75
End: 2025-05-20
Payer: MEDICARE

## 2025-07-22 ENCOUNTER — OFFICE VISIT (OUTPATIENT)
Dept: NEUROLOGY | Facility: MEDICAL CENTER | Age: 75
End: 2025-07-22
Attending: PSYCHIATRY & NEUROLOGY
Payer: MEDICARE

## 2025-07-22 VITALS
DIASTOLIC BLOOD PRESSURE: 64 MMHG | BODY MASS INDEX: 27.44 KG/M2 | HEIGHT: 65 IN | WEIGHT: 164.68 LBS | OXYGEN SATURATION: 96 % | SYSTOLIC BLOOD PRESSURE: 110 MMHG | RESPIRATION RATE: 16 BRPM | HEART RATE: 74 BPM | TEMPERATURE: 98.7 F

## 2025-07-22 DIAGNOSIS — F02.A0 MILD LATE ONSET ALZHEIMER'S DEMENTIA WITHOUT BEHAVIORAL DISTURBANCE, PSYCHOTIC DISTURBANCE, MOOD DISTURBANCE, OR ANXIETY (HCC): Primary | ICD-10-CM

## 2025-07-22 DIAGNOSIS — G30.1 MILD LATE ONSET ALZHEIMER'S DEMENTIA WITHOUT BEHAVIORAL DISTURBANCE, PSYCHOTIC DISTURBANCE, MOOD DISTURBANCE, OR ANXIETY (HCC): Primary | ICD-10-CM

## 2025-07-22 PROCEDURE — 3074F SYST BP LT 130 MM HG: CPT | Performed by: PSYCHIATRY & NEUROLOGY

## 2025-07-22 PROCEDURE — 99214 OFFICE O/P EST MOD 30 MIN: CPT | Performed by: PSYCHIATRY & NEUROLOGY

## 2025-07-22 PROCEDURE — 3078F DIAST BP <80 MM HG: CPT | Performed by: PSYCHIATRY & NEUROLOGY

## 2025-07-22 PROCEDURE — 99213 OFFICE O/P EST LOW 20 MIN: CPT | Performed by: PSYCHIATRY & NEUROLOGY

## 2025-07-22 RX ORDER — DONEPEZIL HYDROCHLORIDE 10 MG/1
TABLET, FILM COATED ORAL
Qty: 90 TABLET | Refills: 6 | Status: SHIPPED | OUTPATIENT
Start: 2025-07-22 | End: 2026-07-16

## 2025-07-22 ASSESSMENT — MONTREAL COGNITIVE ASSESSMENT (MOCA)
1. ALTERNATING TRAIL MAKING: 0/1
6. READ LIST OF DIGITS [FORWARD/BACKWARD]: 1/2
5. MEMORY TRIALS: SECOND TRIAL
10. [FLUENCY] NAME WORDS STARTING WITH DESIGNATED LETTER: 0/1
9. REPEAT EACH SENTENCE: 1/2
3. DRAW A CLOCK: CONTOUR, NUMBERS, HANDS: 1/3
4. NAME EACH OF THE THREE ANIMALS SHOWN: 2/3
11. FOR EACH PAIR OF WORDS, WHAT CATEGORY DO THEY BELONG TO (OUT OF 2): 1/2
WHAT IS THE TOTAL SCORE (OUT OF 30): 10
8. SERIAL SUBTRACTION OF 7S: 0/5
DELAYED RECALL SUBSCORE: 1/5
7. [VIGILENCE] TAP WHEN HEARING DESIGNATED LETTER: 1/1
CATEGORY CUE (IF APPLICABLE): 3
WHAT IS THE VERSION OF MOCA ADMINISTERED: 7.1
ORIENTATION SUBSCORE: 2/6
2. COPY DRAWING: 0/1

## 2025-07-22 ASSESSMENT — PATIENT HEALTH QUESTIONNAIRE - PHQ9
5. POOR APPETITE OR OVEREATING: 2 - MORE THAN HALF THE DAYS
CLINICAL INTERPRETATION OF PHQ2 SCORE: 2
SUM OF ALL RESPONSES TO PHQ QUESTIONS 1-9: 9

## 2025-07-22 ASSESSMENT — FIBROSIS 4 INDEX: FIB4 SCORE: 2.21

## 2025-07-22 NOTE — PROGRESS NOTES
"Follow up:     Here with .     10/2024   last visit with me.     Reason: Mild (late onset) Alzheimer's Disease     Derek Guzman 73 y.o. right handed woman who is here with her . They recently moved from [place] and moved to the [facility] about 1 year ago.  She graduated HS and retired from [email or web address] after working nearly 17 years or so (she did \"intake\" and gather information).     Onset per  around 3 to 4  years ago Derek started having issues being easily distracted. Ex: she would get  coffee and see something else that needed to be done and completely forgot what she wanted to do.  She was easily distracted and recognized this issue.   Short term memory has clearly and gradually worsened in the last 6-12 months. She will ask her  \"what are we going to do\" (despite being told what they were going to do.\")> this issue has become more frequent.     Since last visit with me in 10/2024  Derek is easily and minorly distracted by another person such that this issue occurs daily> example> being asked to prepare coffee and then someone like the grand children asking her a brief question> she would forgot that she was intending or planning to make coffee. This has been a consistent issue and she is having increasing tendencies to ask the same question(s) over again usually within  7-10 minutes of being told the answer.     It is common that her  will tell Derek- \"we are going to see Dr. Mitchell\" and she can within 10 minutes ask again \"where are we going\"    She continues goes shopping by herself and is able to lode the GPS without getting lost driving. (Note- she passed the OT Guided Driving Simulation on 8/14/2023 at [facility]).  She has been able to follow a shopping list when going to the grocery store ([employer] in [place]).     Derek  had previously taken Prevagen taken for about 8 months> but no clear benefit.     There is no history of stroke events known to have " occurred in adult life.     No history of seizure type events or a divya diagnosis of epilepsy.     No history of concussion(s).     Derek has not had  falls or near falls in the last 6 months or so.     There has been no postural instability evolving or ongoing in the last 6 months.     Denies feeling or being depressed,anxious,nervous,paranoid,delusional or hallucinatory in the recent 6 months ( agreed upon by ).        Average sleep- 7-8 hours most nights in the recent months. There has been no  REM Behavioral symptoms admitted to in recent months. No snoring,gasping,snorting behaviors.        Smoked- early 20(s)- 1/4 pack for 5 years ago.  Minimal alcohol use in adult life.     Mother: probable Alzheimer's Disease (onset in late 50's with subtle onset and progression to  in her early 70s).  Mother's brother:  probable Alz. Disease (onset somewhere early to mid 60s and  just before age 70)    Reason for Neurology Consult:         Patient Active Problem List    Diagnosis Date Noted    Arthritis of right knee 2025    Chronic pain of right knee 2024    Dementia (HCC) 2024    Mild late onset Alzheimer's dementia without behavioral disturbance, psychotic disturbance, mood disturbance, or anxiety (HCC) 2023    Mixed hyperlipidemia 2023    Prediabetes 2018    Osteopenia of multiple sites 2017    Primary osteoarthritis of both hands 2017    Sigmoid diverticulosis 2016    Degenerative disc disease, lumbar 2014    History of melanoma 10/10/2013    History of colonoscopy with polypectomy 2010    Non-seasonal allergic rhinitis 10/01/2009       Past medical history:   Past Medical History[1]    Past surgical history:   Past Surgical History[2]      Social history:   Social History     Socioeconomic History    Marital status:      Spouse name: Not on file    Number of children: Not on file    Years of education: Not on file    Highest  education level: Associate degree: occupational, technical, or vocational program   Occupational History    Not on file   Tobacco Use    Smoking status: Former     Current packs/day: 0.00     Average packs/day: 0.5 packs/day for 29.0 years (14.5 ttl pk-yrs)     Types: Cigarettes     Start date: 1971     Quit date: 1978     Years since quittin.5    Smokeless tobacco: Never    Tobacco comments:     avoid all tobacco products   Vaping Use    Vaping status: Never Used   Substance and Sexual Activity    Alcohol use: Not Currently    Drug use: No    Sexual activity: Not Currently     Partners: Male     Birth control/protection: Male Sterilization, Post-Menopausal   Other Topics Concern    Not on file   Social History Narrative    Not on file     Social Drivers of Health     Financial Resource Strain: Low Risk  (2024)    Overall Financial Resource Strain (CARDIA)     Difficulty of Paying Living Expenses: Not very hard   Food Insecurity: No Food Insecurity (2024)    Hunger Vital Sign     Worried About Running Out of Food in the Last Year: Never true     Ran Out of Food in the Last Year: Never true   Transportation Needs: No Transportation Needs (2024)    PRAPARE - Transportation     Lack of Transportation (Medical): No     Lack of Transportation (Non-Medical): No   Physical Activity: Insufficiently Active (2024)    Exercise Vital Sign     Days of Exercise per Week: 2 days     Minutes of Exercise per Session: 10 min   Stress: No Stress Concern Present (2024)    [place] [employer] - Occupational Stress Questionnaire     Feeling of Stress : Only a little   Social Connections: Moderately Isolated (2024)    Social Connection and Isolation Panel [NHANES]     Frequency of Communication with Friends and Family: Once a week     Frequency of Social Gatherings with Friends and Family: More than three times a week     Attends Mandaeism Services: Never     Active Member of  Clubs or Organizations: No     Attends Club or Organization Meetings: Never     Marital Status:    Intimate Partner Violence: Low Risk  (6/6/2024)    Received from [facility]    History of Abuse     Have you ever been afraid of, threatened, neglected, or abused by someone?: No   Housing Stability: Low Risk  (4/18/2024)    Housing Stability Vital Sign     Unable to Pay for Housing in the Last Year: No     Number of Places Lived in the Last Year: 2     Unstable Housing in the Last Year: No       Family history:   Family History   Problem Relation Age of Onset    Colorectal Cancer Mother     Cancer Mother         colon cancer twice, lung cancer    Diabetes Mother     Heart Disease Father         mi    Diabetes Father     Heart Attack Father     No Known Problems Sister     No Known Problems Sister     No Known Problems Sister     Diabetes Brother     No Known Problems Brother     No Known Problems Maternal Aunt     No Known Problems Maternal Uncle     No Known Problems Paternal Aunt     No Known Problems Paternal Uncle     No Known Problems Maternal Grandmother     No Known Problems Maternal Grandfather     No Known Problems Paternal Grandmother     No Known Problems Paternal Grandfather     No Known Problems Son     No Known Problems Son          Current medications:   Current Outpatient Medications   Medication    Magnesium Citrate 1.745 GM/30ML Solution    docusate sodium (COLACE) 100 MG Cap    cetirizine (ZYRTEC) 10 MG Tab    diphenhydrAMINE (BENADRYL) 25 MG Tab    polyethylene glycol 3350 (MIRALAX) 17 GM/SCOOP Powder    hydrocortisone 2.5 % Ointment    benzonatate (TESSALON) 100 MG Cap    lactulose 10 g/15mL Solution    ondansetron (ZOFRAN ODT) 4 MG TABLET DISPERSIBLE     No current facility-administered medications for this visit.       Medication Allergy:  Allergies[3]        Physical examination:   Vitals:    07/22/25 1339   BP: 110/64   BP Location: Left arm   Patient Position: Sitting  "  BP Cuff Size: Adult   Pulse: 74   Resp: 16   Temp: 37.1 °C (98.7 °F)   TempSrc: Temporal   SpO2: 96%   Weight: 74.7 kg (164 lb 10.9 oz)   Height: 1.651 m (5' 5\")       Normal cephalic atraumatic.  There is full range of movement around the neck in all directions without restrictions or discrete pain evoked triggers.  No lower extremity edema.      Neurological  Exam:      [place] Cognitive Assessment (MOCA) Version 7.1    Years of Education: 1.5 years of college    TOTAL SCORE: 10/30  (to be scanned into the MEDIA section in the E.M.R.)    She thought she was 44 years old (she is 74)    4 quarters in 1 dollar correctly stated.    12 quarters in 3 dollars correctly stated.    2 dimes, 1 nickel and 1 mago> \"22 cents\"    [place] Cognitive Assessment (MOCA) Version Number: 7.1   VISUOSPACIAL / EXECUTIVE   Clock Drawin/3 (see media section for specifics)  Spatial Drawin/1 (see media section for specifics)  Cube Drawin/1 (see media section for specifics)    NAMING  Namin/3 (see media section for specifics)    MEMORY  Memory: Second trial    ATTENTION  Digits: 1/2 (see media section for specifics)  Letters: 1/1 (see media section for specifics)  Subtraction: 0/5 (this was very hard to do it for Derek- see media section for specifics)    LANGUAGE  Repeat Phrases: 1/2 (see media section for specifics)  Fluency: 0/1 (stated 4 words in 1 minute that begins with an F)    ABSTRACTION  Abstraction: 1/2 (see media section for specifics)    DELAYED RECALL  Recall words: 1/5  Category Cue (if applicable):    Multiple Choice Cue (if applicable):3 (got Face and Druze but not Nallely (stated Tulip)-see media section for specifics)    ORIENTATION  Orientation: 2/6 (she had no idea the date (\"Maybe  and \")- see media section for specifics)    Add 1 point if less than or equal to 12 yr education level:     MOCA TOTAL SCORE:  10 /30  Biomechanical/Visual Limitations (if applicable):            Mental status: " Awake, alert and  oriented to person, place, and situation. Normal attention and concentration.  Did not appear/act combative,irritable,anxious,paranoid/delusional or aggressive to or with me.    Speech and language: Speech is fluent without errors, clear, intact to repetition, and intact to naming.     Follows 3 step motor commands in sequence without significant delay and correctly.    Cranial nerve exam:  II: Pupils are equally round and reactive to light. Visual fields are intact by confrontation.  III, IV, VI: EOMI, no diplopia, no ptosis.  V: Sensation to light touch is normal over V1-3 distributions bilaterally.  .  VII: Facial movements are symmetrical. There is no facial droop. .  VIII: Hearing intact to soft speech and finger rub bilaterally  IX: Palate elevates symmetrically, uvula is midline. Dysarthria is not present.  XI: Shoulder shrug are symmetrical and strong.   XII: Tongue protrudes midline.      Motor exam:  Muscle tone is normal in all 4 limbs. and No abnormal movements appreciated.    Muscle strength:    Neck Flexors/Extensors: 5/5       Right  Left  Deltoid   5/5  5/5      Biceps   5/5  5/5  Triceps  5/5  5/5   Wrist extensors 5/5  5/5  Wrist flexors  5/5  5/5     5/5  5/5  Interossei  5/5  5/5  Thenar (APB)  5/5  5/5   Hip flexors  5/5  5/5  Quadriceps  5/5  5/5    Hamstrings  5/5  5/5  Dorsiflexors  5/5  5/5  Plantarflexors  5/5  5/5  Toe extension  5/5  5/5        Reflexes:       Right  Left  Biceps   2/4  2/4  Triceps             2/4  2/4  Brachioradialis 2/4  2/4  Knee jerk  2/4  2/4  Ankle jerk  2/4  2/4     Frontal release signs are absent    bilaterally toes are downgoing to plantar stimulation..    Coordination (finger-to-nose, heel/knee/shin, rapid alternating movements) was normal.     There was no ataxia, no tremors, and no dysmetria.     Station and gait >easily stands up from exam chair without retropulsion,veering,leaning,swaying (to either side).       Labs and Tests:            Impression/Plans/Recommendations:    MRI Brain WO IV Cont  Order: [id number]  Impression      1.  No acute intracranial arteries. No evidence of acute cerebrovascular  infarct.    2.  Mild sequela of chronic small vessel ischemic changes.    Electronically Signed by: Dallas Chang MD 5/14/2024 8:45 PM  Narrative    EXAM:  MRI BRAIN WO IV CONT    INDICATION: 73 years old, Female.  dizziness, ataxia    TECHNIQUE: Routine MRI of the brain without contrast. No adverse affect.    CONTRAST: None    COMPARISON: None.    FINDINGS:  Brain: No diffusion restriction to suggest acute infarct. Major intracranial  flow voids are preserved. Ventricles and sulci are mildly dilated prominent  consistent with mild volume loss. Nonspecific T2 hyperintense white matter  signal foci although statistically most likely due to mild small vessel ischemic  disease. No intracranial mass identified. No fluid collection identified.  Orbits: Visualized orbits are unremarkable.  Sinuses: Visualized paranasal sinuses grossly unremarkable.  Mastoid air cells: Mastoid air cells are clear.  Marrow: Bone marrow grossly unremarkable.  Other: Unremarkable.  Exam End: 05/14/24  8:29 PM Last Resulted: 05/14/24  8:45 PM   Received From: [facility]  Result Received: 05/21/24  7:28 AM     Jp Mitchell M.D. on 5/22/2024  5:47 AM     VITAMIN B12  Order: [id number]   Status: Final result       Next appt: None    Test Result Released: Yes (seen)       Messages: Seen    0 Result Notes       1 Patient Communication       Component  Ref Range & Units (hover) 1 yr ago  (5/21/24) 1 yr ago  (10/3/23)   Vitamin B12 -True Cobalamin 704 762   Resulting Agency [facility]             Specimen Collected: 05/21/24  7:45 AM Last Resulted: 05/21/24 11:41 PM         Status: Final result       Next appt: None    Test Result Released: Yes (seen)       Messages: Seen    0 Result Notes       1 Patient Communication       Component  Ref Range & Units (hover) 1  yr ago  (5/21/24) 1 yr ago  (10/3/23)   Folate -Folic Acid 27.4 29.9 CM   Comment: Results obtained by dilution.   Resulting Agency [facility]             Specimen Collected: 05/21/24  7:45 AM Last Resulted: 05/21/24 11:41 PM     tus: Final result       Next appt: None       Dx: Mild dementia associated with other u...    Test Result Released: Yes (seen)       Messages: Seen    0 Result Notes       1 Patient Communication            Component  Ref Range & Units (hover) 1 yr ago  (5/21/24) 2 yr ago  (4/20/23) 2 yr ago  (4/20/23) 3 yr ago  (7/19/22) 3 yr ago  (9/22/21) 4 yr ago  (11/19/20) 5 yr ago  (12/12/19)   Sodium 143  143 141 140 145 139   Potassium 4.8  4.3 4.6 4.6 4.6 4.4   Chloride 107  108 107 106 110 108   Co2 25  24 26 25 28 24   Anion Gap 11.0  11.0 8.0 9.0 7.0 7.0 R   Glucose 87  74 86 84 76 79   Bun 21  15 20 17 20 30 High    Creatinine 0.74  0.62 0.72 0.68 0.62 0.69   Calcium 9.4  9.6 9.5 R 10.4 10.1 9.5   Correct Calcium 9.5 9.7        AST(SGOT) 33  18 16 22 27 22   ALT(SGPT) 32  14 12 16 24 14   Alkaline Phosphatase 69  80 61 65 69 59   Total Bilirubin 0.4  0.4 0.4 0.4 0.4 0.5   Albumin 3.9  3.9 4.1 4.3 4.3 4.1   Total Protein 6.3  6.7 6.4 6.7 6.4 6.1   Globulin 2.4  2.8 2.3 2.4 2.1 2.0   A-G Ratio 1.6  1.4 1.8 1.8 2.0 2.1   Resulting Agency M [facility] M [facility] M M [facility]        VITAMIN B1  Order: [id number]   Status: Final result       Next appt: None       Dx: Mild dementia associated with other u...    Test Result Released: Yes (seen)    0 Result Notes       Component  Ref Range & Units (hover) 1 yr ago  (5/21/24) 1 yr ago  (10/3/23)   Vitamin B1 170 151 CM   Comment: INTERPRETIVE INFORMATION: Vitamin B1, Whole Blood  This assay measures the concentration of thiamine diphosphate  (TDP), the primary active form of vitamin B1. Approximately 90  percent of vitamin B1 present in whole blood is TDP. Thiamine and  thiamine monophosphate, which comprise the remaining 10 percent,  are not measured.  This test was developed and  "its performance characteristics  determined by [facility]. It has not been cleared or  approved by the US Food and Drug Administration. This test was  performed in a CLIA certified laboratory and is intended for  clinical purposes.  Performed By: [facility]  [address] [place] [id number]  : Jp Cochran MD, PhD  CLIA Number: [id number]   Resulting Agency [facility]        Mild Stage of Dementia- Alzheimer's Disease with slow or gradual changes in short term memory over multiple years and additional problems with word retrieval and executive functioning.     There are no features of parkinsonism at this time or an evolving gait-balance disorder since I last saw Derek in 10/2024.         MOCA score today of 10/30 > see media section for specifics     The Alzheimer's Questionnaire Score of 18 (per )> was a \"13\" in 10/2025- see media section for specifics.     Functional Activity Questionnaire score today of  13  (per )> 2's for paying bills, balancing checkbook, remembering appointments and medications).        Today, I reviewed the clinical criteria and reviewed several  scenarios of the differences being using the medical terms of normal brain aging (age associated memory impairment),  Mild Cognitive Impairment (MCI) and Dementia.    Dementia  is a syndrome but statistically and for the majority of patients  occurs due  to a more rapid aging of the brain tissue or potentially from injury to certain parts of one's brain ( often from such contributing factors as  the cumulative effects of alcohol, from one or more ischemic or hemmorhagic stroke(s), from neurodegeneration or long standing with/without suboptimally controlled Hypertension). It is for some of these potential factors as to why I recommend a brain imaging test (Head CT or Brain MRI) be done for the 1st time or in certain circumstances repeated for comparison purposes  as such imaging can " "suggest one or more factors as to the reason(s) for the person's cognitive/memory changes. In fact, a normal or \"age related\" finding on a brain imaging test in and of itself is useful clinical and objective information to have in the evaluation of a person who has either an acute, evolving or even chronic (months to years) long cognitive/memory complaint.     Additional factors or contributors to Brain Health issues can be summarized in several books/references which I discussed as well today.      Goals going forwards include:     A. Paying attention to one's risk factors and reducing their prevalence or potential impact on one's changing memory/thinking> an excellent example would be to stop smoking, reduce or eliminate alcohol use (depending on the amount and frequency of usage), maintain good blood pressure control by buying a digital arm blood pressure cuff such as an OMRON Series 3 or 5 and checking one's blood pressure atleast 3 days per week (in the am and early afternoon) that the numbers are under 140/90 and working as needed with the primary care doctor  to optimize blood pressure control).     B. Encouraging proper sleep hygiene which for most adults is 7 to 8 hours of uninterrupted sleep per night.       C. Encouraging some form of exercise preferable aerobic forms to be done (4 to 5 days per week- 30 minutes minimum per day)> 150 minutes per week as a goal. Example activities could include fast walking (up a slight incline), jogging, cycling (road or stationary), swimming,tennis. Essentially, even basic walking on a flat surface or a treadmill would be better than doing nothing.     D. Maintaining or forming new social contacts with family and friends  and a positive attitude despite the concerns and/or ongoing issues with thinking and/or memory.     E. Eating well which means a diet low in salt  (under 2 grams per day), sugar and saturated fat.     F. Maintaining one's BMI (Body Mass Index) under " 30.     G. Consideration of the use of cognitive enhancers (acetylcholinerase inhibitors such as Aricept vs an NMDA Receptor agent like Namenda). Pros and cons of such compounds in terms of predicted efficacy and side effects profiles reviewed.     Plan to start Aricept (ie, Donepezil) -  5 mg a day x 1 month, then 10 mg a day as maintenance dose. Goals of Rx and side effects profile reviewed including GI disturbances, dizziness/lightheadedness and need to check heart rate daily x 7 days in a row on 5 mg and then 10 mg a day dose> if pulse under 50 BPM or dizzy/faint/lightheaded, then Derek told to stop this medications.     H. Will hold off on doing a  Brain PET Scan and Precivity AD2 blood biomarker testing  after discussion today of these tests (ie, the pros vs cons).     I. Discussed importance of exercise and the MIND Diet today.     J. Anti amyloid drugs discussed today including Leqembi and Kisunla> Derek and  agree not to pursue them due to risks inherent in using them vs small degree of potential benefit.     K. Caregiver assistance discussed today with .    He did not feel that he needed this assistance at this time.     M.She has completely stopped driving over 1 year ago.    Time spent today was 32 minutes to review above issues.    Follow up in about 6 months or so.       [1]   Past Medical History:  Diagnosis Date    Allergic rhinitis 10/01/2009    Allergy     Cancer (HCC)     Cataract     Cognitive changes 02/02/2024    Elevated LDL cholesterol level 07/23/2018    Chronic and stable condition     The 10-year ASCVD risk score (Cassiuspriscilla BENITO Jr., et al., 2013) is: 6.6%    Values used to calculate the score:      Age: 69 years      Sex: Female      Is Non- : No      Diabetic: No      Tobacco smoker: No      Systolic Blood Pressure: 118 mmHg      Is BP treated: No      HDL Cholesterol: 52 mg/dL      Total Cholesterol: 192 mg/dL  Above complete    Osteopenia     Osteoporosis      Pain     plate to left foot    Sigmoid diverticulosis 2016   [2]   Past Surgical History:  Procedure Laterality Date    PB TOTAL KNEE ARTHROPLASTY Right 2025    Procedure: RIGHT TOTAL KNEE ARTHROPLASTY;  Surgeon: Aidan Orosco M.D.;  Location: [facility];  Service: Orthopedics    ORIF, FRACTURE, TIBIA Left 2017    Procedure: TIBIA ORIF;  Surgeon: Casey Jewell M.D.;  Location: [facility];  Service:     TOE FUSION  2011    Performed by BARTOLOME ROBLES at SURGERY [facility]    BONE GRAFT  2011    Performed by BARTOLOME ROBLES at SURGERY [facility]    SHOULDER ARTHROSCOPY  2009    right; Performed by RICHARD ELIZONDO at SURGERY SAME DAY [facility] ORS    ROTATOR CUFF REPAIR  2009    Performed by RICHARD ELIZONDO at SURGERY SAME DAY [facility] ORS    OR  DELIVERY ONLY  1979    EYE SURGERY      PRIMARY C SECTION     [3]   Allergies  Allergen Reactions    Tape Rash     Blisters. Tegaderm ok

## (undated) DEVICE — DRAPE C-ARM LARGE 41IN X 74 IN - (10/BX 2BX/CA)

## (undated) DEVICE — TUBE CONNECTING SUCTION - CLEAR PLASTIC STERILE 72 IN (50EA/CA)

## (undated) DEVICE — CANISTER SUCTION 3000ML MECHANICAL FILTER AUTO SHUTOFF MEDI-VAC NONSTERILE LF DISP  (40EA/CA)

## (undated) DEVICE — DRAPE SURG STERI-DRAPE 7X11OD - (40EA/CA)

## (undated) DEVICE — ELECTRODE 850 FOAM ADHESIVE - HYDROGEL RADIOTRNSPRNT (50/PK)

## (undated) DEVICE — HEAD HOLDER JUNIOR/ADULT

## (undated) DEVICE — SUTURE GENERAL

## (undated) DEVICE — NEPTUNE 4 PORT MANIFOLD - (20/PK)

## (undated) DEVICE — SPLINT PLASTER 5 IN X 30 IN - (50EA/BX 6BX/CA)

## (undated) DEVICE — GLOVE BIOGEL PI ORTHO SZ 7 PF LF (40PR/BX)

## (undated) DEVICE — PADDING CAST 6 IN STERILE - 6 X 4 YDS (24/CA)

## (undated) DEVICE — SENSOR SPO2 NEO LNCS ADHESIVE (20/BX) SEE USER NOTES

## (undated) DEVICE — SET LEADWIRE 5 LEAD BEDSIDE DISPOSABLE ECG (1SET OF 5/EA)

## (undated) DEVICE — SUTURE 2-0 VICRYL PLUS CT-1 36 (36PK/BX)"

## (undated) DEVICE — CHLORAPREP 26 ML APPLICATOR - ORANGE TINT(25/CA)

## (undated) DEVICE — DRILL BIT 2.5 TWIST 310.25 (8TX2+1TX3=19)

## (undated) DEVICE — Device

## (undated) DEVICE — GLOVE BIOGEL SZ 6.5 SURGICAL PF LTX (50PR/BX 4BX/CA)

## (undated) DEVICE — DRAPE U ORTHOPEDIC - (10/BX)

## (undated) DEVICE — BOVIE BLADE COATED - (50/PK)

## (undated) DEVICE — CANISTER SUCTION RIGID RED 1500CC (40EA/CA)

## (undated) DEVICE — WRAP CO-FLEX 4IN X 5YD STERIL - SELF-ADHERENT (18/CA)

## (undated) DEVICE — PACK LOWER EXTREMITY - (2/CA)

## (undated) DEVICE — TUBING CLEARLINK DUO-VENT - C-FLO (48EA/CA)

## (undated) DEVICE — SODIUM CHL IRRIGATION 0.9% 1000ML (12EA/CA)

## (undated) DEVICE — GOWN WARMING STANDARD FLEX - (30/CA)

## (undated) DEVICE — SUCTION INSTRUMENT YANKAUER BULBOUS TIP W/O VENT (50EA/CA)

## (undated) DEVICE — SUTURE 0 VICRYL PLUS CT-1 - 36 INCH (36/BX)

## (undated) DEVICE — DRAPE C ARMOR (12EA/CA)

## (undated) DEVICE — GLOVE BIOGEL PI INDICATOR SZ 7.0 SURGICAL PF LF - (50/BX 4BX/CA)

## (undated) DEVICE — KIT ANESTHESIA W/CIRCUIT & 3/LT BAG W/FILTER (20EA/CA)

## (undated) DEVICE — CATHETER IV 20 GA X 1-1/4 ---SURG.& SDS ONLY--- (50EA/BX)

## (undated) DEVICE — BLADE SURGICAL #15 - (50/BX 3BX/CA)

## (undated) DEVICE — GLOVE BIOGEL INDICATOR SZ 7.5 SURGICAL PF LTX - (50PR/BX 4BX/CA)

## (undated) DEVICE — PROTECTOR ULNA NERVE - (36PR/CA)

## (undated) DEVICE — BANDAGE ELASTIC 6 HONEYCOMB - 6X5YD LF (20/CA)"

## (undated) DEVICE — LACTATED RINGERS INJ 1000 ML - (14EA/CA 60CA/PF)

## (undated) DEVICE — MASK ANESTHESIA ADULT  - (100/CA)

## (undated) DEVICE — MASK, LARYNGEAL AIRWAY #4

## (undated) DEVICE — STAPLER SKIN DISP - (6/BX 10BX/CA) VISISTAT

## (undated) DEVICE — ELECTRODE DUAL RETURN W/ CORD - (50/PK)

## (undated) DEVICE — COTTON ROLL 1 POUND BIOSEAL - (5RL/CA)